# Patient Record
Sex: MALE | Race: WHITE | NOT HISPANIC OR LATINO | Employment: FULL TIME | URBAN - NONMETROPOLITAN AREA
[De-identification: names, ages, dates, MRNs, and addresses within clinical notes are randomized per-mention and may not be internally consistent; named-entity substitution may affect disease eponyms.]

---

## 2017-03-01 ENCOUNTER — OFFICE VISIT (OUTPATIENT)
Dept: MEDICAL GROUP | Facility: PHYSICIAN GROUP | Age: 37
End: 2017-03-01
Payer: COMMERCIAL

## 2017-03-01 VITALS
OXYGEN SATURATION: 98 % | WEIGHT: 240 LBS | DIASTOLIC BLOOD PRESSURE: 72 MMHG | SYSTOLIC BLOOD PRESSURE: 128 MMHG | HEIGHT: 70 IN | RESPIRATION RATE: 16 BRPM | HEART RATE: 70 BPM | BODY MASS INDEX: 34.36 KG/M2 | TEMPERATURE: 98.6 F

## 2017-03-01 DIAGNOSIS — R53.83 FATIGUE, UNSPECIFIED TYPE: ICD-10-CM

## 2017-03-01 DIAGNOSIS — E78.1 HYPERTRIGLYCERIDEMIA: ICD-10-CM

## 2017-03-01 DIAGNOSIS — R79.89 LOW TESTOSTERONE: ICD-10-CM

## 2017-03-01 DIAGNOSIS — R79.89 LFTS ABNORMAL: ICD-10-CM

## 2017-03-01 DIAGNOSIS — Z72.820 POOR SLEEP: ICD-10-CM

## 2017-03-01 PROCEDURE — 99214 OFFICE O/P EST MOD 30 MIN: CPT | Performed by: NURSE PRACTITIONER

## 2017-03-01 NOTE — MR AVS SNAPSHOT
"        Braden Hill   3/1/2017 10:20 AM   Office Visit   MRN: [id number]    Department:  [facility]   Dept Phone:  [phone]    Description:  Male : 1980   Provider:  YVAN Sanchez           Reason for Visit     Fatigue           Allergies as of 3/1/2017     No Known Allergies      You were diagnosed with     Fatigue, unspecified type   [9260402]       Hypertriglyceridemia   [022246]       Poor sleep   [4352817]         Vital Signs     Blood Pressure Pulse Temperature Respirations Height Weight    128/72 mmHg 70 37 °C (98.6 °F) 16 1.778 m (5' 10\") 108.863 kg (240 lb)    Body Mass Index Oxygen Saturation Smoking Status             34.44 kg/m2 98% Former Smoker         Basic Information     Date Of Birth Sex Race Ethnicity Preferred Language    1980 Male White Non- English      Problem List              ICD-10-CM Priority Class Noted - Resolved    Hypertriglyceridemia E78.1   2013 - Present    Poor sleep Z72.820   2013 - Present    BMI 34.0-34.9,adult Z68.34   9/10/2015 - Present    Fatigue R53.83   9/10/2015 - Present    Palpitations R00.2   9/10/2015 - Present    Elevated triglycerides with high cholesterol E78.2   9/15/2015 - Present    Low testosterone E29.1   9/15/2015 - Present    LFTs abnormal R79.89   9/15/2015 - Present    Vitamin D deficiency E55.9   2015 - Present      Health Maintenance        Date Due Completion Dates    IMM DTaP/Tdap/Td Vaccine (1 - Tdap) 1998 ---    IMM INFLUENZA (1) 2016 ---            Current Immunizations     Hepatitis B Vaccine Non-Recombivax (Ped/Adol) 2012    Hepatitis B Vaccine Recombivax (Adol/Adult) 2013, 2013      Below and/or attached are the medications your provider expects you to take. Review all of your home medications and newly ordered medications with your provider and/or pharmacist. Follow medication instructions as directed by your provider and/or pharmacist. Please keep your medication " list with you and share with your provider. Update the information when medications are discontinued, doses are changed, or new medications (including over-the-counter products) are added; and carry medication information at all times in the event of emergency situations     Allergies:  No Known Allergies          Medications  Valid as of: March 01, 2017 - 10:48 AM    Generic Name Brand Name Tablet Size Instructions for use    .                 Medicines prescribed today were sent to:     [id number] - [place], [place] - [address] [place] [address]    Phone: [phone] Fax: [phone]    Open 24 Hours?: No      Medication refill instructions:       If your prescription bottle indicates you have medication refills left, it is not necessary to call your provider’s office. Please contact your pharmacy and they will refill your medication.    If your prescription bottle indicates you do not have any refills left, you may request refills at any time through one of the following ways: The online [employer] system (except Urgent Care), by calling your provider’s office, or by asking your pharmacy to contact your provider’s office with a refill request. Medication refills are processed only during regular business hours and may not be available until the next business day. Your provider may request additional information or to have a follow-up visit with you prior to refilling your medication.   *Please Note: Medication refills are assigned a new Rx number when refilled electronically. Your pharmacy may indicate that no refills were authorized even though a new prescription for the same medication is available at the pharmacy. Please request the medicine by name with the pharmacy before contacting your provider for a refill.        Your To Do List     Future Labs/Procedures Complete By Expires    COMP METABOLIC PANEL  As directed 3/1/2018    VITAMIN D,25 HYDROXY  As directed  3/1/2018      Referral     A referral request has been sent to our patient care coordination department. Please allow 3-5 business days for us to process this request and contact you either by phone or mail. If you do not hear from us by the 5th business day, please call us at [phone].           [employer] Access Code: Activation code not generated  Current [employer] Status: Active

## 2017-03-01 NOTE — ASSESSMENT & PLAN NOTE
Patient was drinking alcohol when he had his last CMP done recheck with new labs. He is no longer drinking alcohol

## 2017-03-01 NOTE — ASSESSMENT & PLAN NOTE
Patient had a history of low testosterone and was referred to endocrinology. He did not keep the appointment

## 2017-03-01 NOTE — ASSESSMENT & PLAN NOTE
A 36-year-old male who's had a history of poor sleep at least 2-3 years. He had 2 overnight oximetry that showed no need for supplemental oxygen. The patient continued to be extremely fatigued. His mother-in-law wanted to help him out and gave him her sleep apnea machine. He is using this without any medical direction. I asked him to be referred to sleep studies as she may be doing more harm than good. He is agreeable

## 2017-03-01 NOTE — ASSESSMENT & PLAN NOTE
Patient continues to have fatigue. Wakes up tired. Works fulltime. Feels heavy. Minds feels foggy. This is progressing.

## 2017-03-01 NOTE — PROGRESS NOTES
Chief Complaint   Patient presents with   • Fatigue       HISTORY OF PRESENT ILLNESS: Patient is a @ age 36 here  today to discuss:    Interval history:     Hospitalizations none    Injuries none    Illness none        Review of Systems   Constitutional: Negative for fever, chills, weight loss patient had major fatigue  HENT: Negative for ear pain, nosebleeds, congestion, sore throat and neck pain.    Eyes: Negative for blurred vision.   Respiratory: Negative for cough, sputum production, shortness of breath and wheezing.    Cardiovascular: Negative for chest pain, positive for palpitations, orthopnea and leg swelling.   Gastrointestinal: Negative for heartburn, nausea, vomiting and abdominal pain.   Genitourinary: Negative for dysuria, urgency and frequency.   Musculoskeletal: Negative for myalgias, back pain and joint pain.   Skin: Negative for rash and itching.   Neurological: Negative for dizziness, tingling, tremors, sensory change, focal weakness and headaches.   Endo/Heme/Allergies: Does not bruise/bleed easily.   Psychiatric/Behavioral: Positive for depression, anxiety, or memory loss.    Patient states that he just is depressed because he feels tired all the time.   Fatigue  Patient continues to have fatigue. Wakes up tired. Works fulltime. Feels heavy. Minds feels foggy. This is progressing.     Hypertriglyceridemia  Patient was diagnosed with elevated triglycerides. Patient was given RX for this, did not take.     Poor sleep  A 36-year-old male who's had a history of poor sleep at least 2-3 years. He had 2 overnight oximetry that showed no need for supplemental oxygen. The patient continued to be extremely fatigued. His mother-in-law wanted to help him out and gave him her sleep apnea machine. He is using this without any medical direction. I asked him to be referred to sleep studies as she may be doing more harm than good. He is agreeable    LFTs abnormal  Patient was drinking alcohol when he had his  "last CMP done recheck with new labs. He is no longer drinking alcohol    Low testosterone  Patient had a history of low testosterone and was referred to endocrinology. He did not keep the appointment        Allergies:Review of patient's allergies indicates no known allergies.    No current [facility]-ordered outpatient prescriptions on file.     No current [facility]-ordered facility-administered medications on file.       Past Medical History   Diagnosis Date   • Hypertriglyceridemia 2013   • Has stopped breathing 2013     At night, fatigued all the time       Social History   Substance Use Topics   • Smoking status: Former Smoker -- 0.25 packs/day for 20 years     Types: Cigars     Quit date: 2011   • Smokeless tobacco: Current User      Comment: 1 can qod   • Alcohol Use: Yes      Comment: Social       Family Status   Relation Status Death Age   • Mother Alive    • Father Alive    • Maternal Grandmother     • Maternal Grandfather     • Paternal Grandmother     • Paternal Grandfather       Family History   Problem Relation Age of Onset   • Hypertension Father    • Cancer Paternal Aunt      lung   • Heart Disease Neg Hx    • Diabetes Neg Hx    • Stroke Neg Hx    • Hyperlipidemia Neg Hx        ROS: As documented in my HPI      Exam:  Blood pressure 128/72, pulse 70, temperature 37 °C (98.6 °F), resp. rate 16, height 1.778 m (5' 10\"), weight 108.863 kg (240 lb), SpO2 98 %.  General:  Well nourished, well developed male in NAD  Head: Nontender scalp. No lesions  Neck: Supple. Symmetric Thyroid palpated. No bruits  Pulmonary:  Normal effort. No rales, ronchi, or wheezing.  Cardiovascular: Regular rate and rhythm without murmur.   Abdomen: Soft nontender, normal bowel sounds  Extremities: no clubbing, cyanosis, or edema.  Psych: Alert and oriented x3.  Emotional: Mood stable pleasant Affect normal  Neurological: No focal deficits    Please note that this dictation was created using " voice recognition software. I have made every reasonable attempt to correct obvious errors, but I expect that there are errors of grammar and possibly content that I did not discover before finalizing the note.    Assessment/Plan:  1. Fatigue, unspecified type  REFERRAL TO SLEEP STUDIES    COMP METABOLIC PANEL    TSH+FREE T4    TESTOSTERONE, FREE AND TOTAL    VITAMIN D,25 HYDROXY   2. Hypertriglyceridemia  LIPID PANEL   3. Poor sleep   sleep study    4. LFTs abnormal   CMP     5. Low testosterone   labs      Patient will return to clinic in one month after getting his labs drawn and revisit.

## 2017-03-29 ENCOUNTER — HOSPITAL ENCOUNTER (OUTPATIENT)
Dept: LAB | Facility: MEDICAL CENTER | Age: 37
End: 2017-03-29
Attending: NURSE PRACTITIONER
Payer: COMMERCIAL

## 2017-03-29 DIAGNOSIS — R53.83 FATIGUE, UNSPECIFIED TYPE: ICD-10-CM

## 2017-03-29 PROCEDURE — 36415 COLL VENOUS BLD VENIPUNCTURE: CPT

## 2017-03-29 PROCEDURE — 82306 VITAMIN D 25 HYDROXY: CPT

## 2017-03-29 PROCEDURE — 80053 COMPREHEN METABOLIC PANEL: CPT

## 2017-03-29 PROCEDURE — 84270 ASSAY OF SEX HORMONE GLOBUL: CPT

## 2017-03-29 PROCEDURE — 84403 ASSAY OF TOTAL TESTOSTERONE: CPT

## 2017-03-29 PROCEDURE — 84402 ASSAY OF FREE TESTOSTERONE: CPT

## 2017-03-29 PROCEDURE — 84443 ASSAY THYROID STIM HORMONE: CPT

## 2017-03-29 PROCEDURE — 84439 ASSAY OF FREE THYROXINE: CPT

## 2017-03-29 PROCEDURE — 80061 LIPID PANEL: CPT

## 2017-03-30 LAB
25(OH)D3 SERPL-MCNC: 18 NG/ML (ref 30–100)
ALBUMIN SERPL BCP-MCNC: 4.3 G/DL (ref 3.2–4.9)
ALBUMIN/GLOB SERPL: 1.7 G/DL
ALP SERPL-CCNC: 107 U/L (ref 30–99)
ALT SERPL-CCNC: 25 U/L (ref 2–50)
ANION GAP SERPL CALC-SCNC: 9 MMOL/L (ref 0–11.9)
AST SERPL-CCNC: 21 U/L (ref 12–45)
BILIRUB SERPL-MCNC: 0.5 MG/DL (ref 0.1–1.5)
BUN SERPL-MCNC: 12 MG/DL (ref 8–22)
CALCIUM SERPL-MCNC: 9.3 MG/DL (ref 8.5–10.5)
CHLORIDE SERPL-SCNC: 106 MMOL/L (ref 96–112)
CHOLEST SERPL-MCNC: 266 MG/DL (ref 100–199)
CO2 SERPL-SCNC: 24 MMOL/L (ref 20–33)
CREAT SERPL-MCNC: 0.9 MG/DL (ref 0.5–1.4)
GLOBULIN SER CALC-MCNC: 2.5 G/DL (ref 1.9–3.5)
GLUCOSE SERPL-MCNC: 99 MG/DL (ref 65–99)
HDLC SERPL-MCNC: ABNORMAL MG/DL
LDLC SERPL CALC-MCNC: ABNORMAL MG/DL
POTASSIUM SERPL-SCNC: 3.9 MMOL/L (ref 3.6–5.5)
PROT SERPL-MCNC: 6.8 G/DL (ref 6–8.2)
SODIUM SERPL-SCNC: 139 MMOL/L (ref 135–145)
T4 FREE SERPL-MCNC: 0.93 NG/DL (ref 0.53–1.43)
TRIGL SERPL-MCNC: 1026 MG/DL (ref 0–149)
TSH SERPL DL<=0.005 MIU/L-ACNC: 0.94 UIU/ML (ref 0.3–3.7)

## 2017-03-31 ENCOUNTER — OFFICE VISIT (OUTPATIENT)
Dept: MEDICAL GROUP | Facility: PHYSICIAN GROUP | Age: 37
End: 2017-03-31
Payer: COMMERCIAL

## 2017-03-31 VITALS
TEMPERATURE: 98.4 F | BODY MASS INDEX: 34.22 KG/M2 | DIASTOLIC BLOOD PRESSURE: 86 MMHG | RESPIRATION RATE: 14 BRPM | OXYGEN SATURATION: 95 % | HEIGHT: 70 IN | WEIGHT: 239 LBS | SYSTOLIC BLOOD PRESSURE: 120 MMHG | HEART RATE: 88 BPM

## 2017-03-31 DIAGNOSIS — R73.9 ELEVATED BLOOD SUGAR: ICD-10-CM

## 2017-03-31 DIAGNOSIS — R79.89 LOW TESTOSTERONE: ICD-10-CM

## 2017-03-31 DIAGNOSIS — R79.89 LFTS ABNORMAL: ICD-10-CM

## 2017-03-31 DIAGNOSIS — E55.9 VITAMIN D DEFICIENCY: ICD-10-CM

## 2017-03-31 DIAGNOSIS — E78.1 HYPERTRIGLYCERIDEMIA: ICD-10-CM

## 2017-03-31 LAB
SHBG SERPL-SCNC: 19 NMOL/L (ref 11–80)
TESTOST FREE MFR SERPL: 2.3 % (ref 1.6–2.9)
TESTOST FREE SERPL-MCNC: 65 PG/ML (ref 47–244)
TESTOST SERPL-MCNC: 284 NG/DL (ref 300–1080)

## 2017-03-31 PROCEDURE — 99214 OFFICE O/P EST MOD 30 MIN: CPT | Performed by: NURSE PRACTITIONER

## 2017-03-31 RX ORDER — FENOFIBRATE 145 MG/1
145 TABLET, COATED ORAL DAILY
Qty: 30 TAB | Refills: 3 | Status: SHIPPED | OUTPATIENT
Start: 2017-03-31 | End: 2018-08-08 | Stop reason: SDUPTHER

## 2017-03-31 ASSESSMENT — PATIENT HEALTH QUESTIONNAIRE - PHQ9: CLINICAL INTERPRETATION OF PHQ2 SCORE: 0

## 2017-03-31 NOTE — MR AVS SNAPSHOT
"        Braden Hill   3/31/2017 11:00 AM   Office Visit   MRN: [id number]    Department:  [facility]   Dept Phone:  [phone]    Description:  Male : 1980   Provider:  YVAN Sanchez           Reason for Visit     Results labs      Allergies as of 3/31/2017     No Known Allergies      You were diagnosed with     Elevated blood sugar   [127936]       Vitamin D deficiency   [1983307]       LFTs abnormal   [480964]       Hypertriglyceridemia   [767630]       Low testosterone   [728260]         Vital Signs     Blood Pressure Pulse Temperature Respirations Height Weight    120/86 mmHg 88 36.9 °C (98.4 °F) 14 1.778 m (5' 10\") 108.41 kg (239 lb)    Body Mass Index Oxygen Saturation Smoking Status             34.29 kg/m2 95% Former Smoker         Basic Information     Date Of Birth Sex Race Ethnicity Preferred Language    1980 Male White Non- English      Problem List              ICD-10-CM Priority Class Noted - Resolved    Hypertriglyceridemia E78.1   2013 - Present    Poor sleep Z72.820   2013 - Present    BMI 34.0-34.9,adult Z68.34   9/10/2015 - Present    Fatigue R53.83   9/10/2015 - Present    Palpitations R00.2   9/10/2015 - Present    Low testosterone E29.1   9/15/2015 - Present    LFTs abnormal R79.89   9/15/2015 - Present    Vitamin D deficiency E55.9   2015 - Present    Elevated blood sugar R73.9 High  3/31/2017 - Present      Health Maintenance        Date Due Completion Dates    IMM DTaP/Tdap/Td Vaccine (1 - Tdap) 1998 ---    IMM INFLUENZA (1) 2016 ---            Current Immunizations     Hepatitis B Vaccine Non-Recombivax (Ped/Adol) 2012    Hepatitis B Vaccine Recombivax (Adol/Adult) 2013, 2013      Below and/or attached are the medications your provider expects you to take. Review all of your home medications and newly ordered medications with your provider and/or pharmacist. Follow medication instructions as directed by your " provider and/or pharmacist. Please keep your medication list with you and share with your provider. Update the information when medications are discontinued, doses are changed, or new medications (including over-the-counter products) are added; and carry medication information at all times in the event of emergency situations     Allergies:  No Known Allergies          Medications  Valid as of: March 31, 2017 -  2:04 PM    Generic Name Brand Name Tablet Size Instructions for use    Fenofibrate (Tab) TRICOR 145 MG Take 1 Tab by mouth every day.        .                 Medicines prescribed today were sent to:     [id number] [place] [facility] [address]    [address] [place] [address]    Phone: [phone] Fax: [phone]    Open 24 Hours?: No      Medication refill instructions:       If your prescription bottle indicates you have medication refills left, it is not necessary to call your provider’s office. Please contact your pharmacy and they will refill your medication.    If your prescription bottle indicates you do not have any refills left, you may request refills at any time through one of the following ways: The online [email or web address] system (except Urgent Care), by calling your provider’s office, or by asking your pharmacy to contact your provider’s office with a refill request. Medication refills are processed only during regular business hours and may not be available until the next business day. Your provider may request additional information or to have a follow-up visit with you prior to refilling your medication.   *Please Note: Medication refills are assigned a new Rx number when refilled electronically. Your pharmacy may indicate that no refills were authorized even though a new prescription for the same medication is available at the pharmacy. Please request the medicine by name with the pharmacy before contacting your provider for a refill.        Your To Do List     Future  Labs/Procedures Complete By Expires    VITAMIN D,25 HYDROXY  As directed 3/31/2018      Instructions    1. New medication - TRICHOR once a day    2. Obtain Vitamin D 2, 3 or 5 thousand units daily.    3. Avoiding the simple sugars.    4. Recheck in 3 months.           [employer] Access Code: Activation code not generated  Current [employer] Status: Active

## 2017-03-31 NOTE — PROGRESS NOTES
Chief Complaint   Patient presents with   • Results     labs       HISTORY OF PRESENT ILLNESS: Patient is a @ age 36 here  today to discuss:    Interval history:     Hospitalizations No     Injuries NO     Illness NO         Elevated blood sugar  Counseled patient on diet and exercise. Patient will continue to change diet. Will recheck in 3 months.     Vitamin D deficiency  Patient has vitamin d level at 18, start taking 3-5, 000 units daily. Will recheck in 3 months.     LFTs abnormal  Liver enzymes are normal [place]  Is 107. Drinking less alcohol. Working out at [employer]    Hypertriglyceridemia  Results for JAYDA LANE (MRN [id number]) as of 3/31/2017 11:16   Ref. Range 9/10/2015 11:12 9/22/2015 13:44 3/29/2017 15:53   Cholesterol,Tot Latest Ref Range: 100-199 mg/dL 387 (H)  266 (H)   Triglycerides Latest Ref Range: 0-149 mg/dL 3036 (H)  1026 (H)   HDL Latest Ref Range: >=40 mg/dL see below  see below   LDL Latest Ref Range: <100 mg/dL see below  see below       Reviewed labs with patient. Improve with no medication. But still needs treatment. Patient is willing to start. Counseled on medication. Side effects and follow up.     Low testosterone  Lab is pending.         Allergies:Review of patient's allergies indicates no known allergies.    Current Outpatient Prescriptions Ordered in [facility]   Medication Sig Dispense Refill   • fenofibrate (TRICOR) 145 MG Tab Take 1 Tab by mouth every day. 30 Tab 3     No current Epic-ordered facility-administered medications on file.       Past Medical History   Diagnosis Date   • Hypertriglyceridemia 9/21/2013   • Has stopped breathing 9/21/2013     At night, fatigued all the time       Social History   Substance Use Topics   • Smoking status: Former Smoker -- 0.25 packs/day for 20 years     Types: Cigars     Quit date: 09/21/2011   • Smokeless tobacco: Former User     Quit date: 01/01/2017      Comment: 1 can qod   • Alcohol Use: Yes      Comment: Social       Family Status   Relation  "Status Death Age   • Mother Alive    • Father Alive    • Maternal Grandmother     • Maternal Grandfather     • Paternal Grandmother     • Paternal Grandfather       Family History   Problem Relation Age of Onset   • Hypertension Father    • Cancer Paternal Aunt      lung   • Heart Disease Neg Hx    • Diabetes Neg Hx    • Stroke Neg Hx    • Hyperlipidemia Neg Hx        ROS: As documented in my HPI      Exam:  Blood pressure 120/86, pulse 88, temperature 36.9 °C (98.4 °F), resp. rate 14, height 1.778 m (5' 10\"), weight 108.41 kg (239 lb), SpO2 95 %.  General:  Well nourished, well developed male in NAD  Head: Nontender scalp. No lesions  Neck: Supple. Symmetric Thyroid palpated. No bruits  Pulmonary:  Normal effort. No rales, ronchi, or wheezing.  Cardiovascular: Regular rate and rhythm without murmur.   Extremities: no clubbing, cyanosis, or edema.  Psych: Alert and oriented x3.  Neurological: No focal deficits    Please note that this dictation was created using voice recognition software. I have made every reasonable attempt to correct obvious errors, but I expect that there are errors of grammar and possibly content that I did not discover before finalizing the note.    Assessment/Plan:  1. Elevated blood sugar  Monitor   2. Vitamin D deficiency  VITAMIN D,25 HYDROXY- new problem and not controlled   3. LFTs abnormal  HEPATIC FUNCTION PANEL continue to monitor   4. Hypertriglyceridemia - NOT controlled LIPID PANEL - Tricor 145 mg one daily- DIET    5. Low testosterone          Repeat labs in 3 months and return for follow up.    Patient will be notified of testosterone level.       "

## 2017-03-31 NOTE — ASSESSMENT & PLAN NOTE
Results for JAYDA LANE (MRN [id number]) as of 3/31/2017 11:16   Ref. Range 9/10/2015 11:12 9/22/2015 13:44 3/29/2017 15:53   Cholesterol,Tot Latest Ref Range: 100-199 mg/dL 387 (H)  266 (H)   Triglycerides Latest Ref Range: 0-149 mg/dL 3036 (H)  1026 (H)   HDL Latest Ref Range: >=40 mg/dL see below  see below   LDL Latest Ref Range: <100 mg/dL see below  see below       Reviewed labs with patient. Improve with no medication. But still needs treatment.

## 2017-03-31 NOTE — PATIENT INSTRUCTIONS
1. New medication - TRICHOR once a day    2. Obtain Vitamin D 2, 3 or 5 thousand units daily.    3. Avoiding the simple sugars.    4. Recheck in 3 months.

## 2018-03-31 ENCOUNTER — OFFICE VISIT (OUTPATIENT)
Dept: URGENT CARE | Facility: PHYSICIAN GROUP | Age: 38
End: 2018-03-31
Payer: COMMERCIAL

## 2018-03-31 VITALS
WEIGHT: 250 LBS | OXYGEN SATURATION: 97 % | TEMPERATURE: 98.3 F | DIASTOLIC BLOOD PRESSURE: 70 MMHG | HEIGHT: 70 IN | BODY MASS INDEX: 35.79 KG/M2 | HEART RATE: 90 BPM | RESPIRATION RATE: 16 BRPM | SYSTOLIC BLOOD PRESSURE: 120 MMHG

## 2018-03-31 DIAGNOSIS — J06.9 ACUTE URI: Primary | ICD-10-CM

## 2018-03-31 PROCEDURE — 99214 OFFICE O/P EST MOD 30 MIN: CPT | Performed by: NURSE PRACTITIONER

## 2018-03-31 RX ORDER — AMOXICILLIN AND CLAVULANATE POTASSIUM 875; 125 MG/1; MG/1
1 TABLET, FILM COATED ORAL 2 TIMES DAILY
Qty: 20 TAB | Refills: 0 | Status: SHIPPED | OUTPATIENT
Start: 2018-03-31 | End: 2018-08-08

## 2018-03-31 RX ORDER — AMOXICILLIN AND CLAVULANATE POTASSIUM 875; 125 MG/1; MG/1
1 TABLET, FILM COATED ORAL 2 TIMES DAILY
Qty: 20 TAB | Refills: 0 | Status: SHIPPED | OUTPATIENT
Start: 2018-03-31 | End: 2018-03-31 | Stop reason: SDUPTHER

## 2018-03-31 RX ORDER — CODEINE PHOSPHATE AND GUAIFENESIN 10; 100 MG/5ML; MG/5ML
5-10 SOLUTION ORAL EVERY 6 HOURS PRN
Qty: 120 ML | Refills: 0 | Status: SHIPPED | OUTPATIENT
Start: 2018-03-31 | End: 2018-04-10

## 2018-03-31 ASSESSMENT — ENCOUNTER SYMPTOMS
VOMITING: 0
RHINORRHEA: 1
SHORTNESS OF BREATH: 0
FEVER: 0
SPUTUM PRODUCTION: 1
SORE THROAT: 0
MYALGIAS: 0
DIARRHEA: 0
CHILLS: 0
WEAKNESS: 1
NAUSEA: 0
COUGH: 1

## 2018-03-31 ASSESSMENT — COPD QUESTIONNAIRES: COPD: 0

## 2018-03-31 NOTE — PROGRESS NOTES
"Subjective:      Braden Hill is a 37 y.o. male who presents with Sinusitis (URI, possible sinus infection)            Medications, Allergies and Prior Medical Hx reviewed and updated in [facility].with patient/family today         Cough   This is a new problem. The current episode started yesterday. The problem has been unchanged. The cough is productive of sputum. Associated symptoms include ear congestion, nasal congestion and rhinorrhea. Pertinent negatives include no chills, ear pain, fever, myalgias, sore throat or shortness of breath. He has tried rest for the symptoms. The treatment provided mild relief. There is no history of asthma or COPD.       Review of Systems   Constitutional: Positive for malaise/fatigue. Negative for chills and fever.   HENT: Positive for congestion and rhinorrhea. Negative for ear discharge, ear pain and sore throat.    Respiratory: Positive for cough and sputum production. Negative for shortness of breath.    Gastrointestinal: Negative for diarrhea, nausea and vomiting.   Musculoskeletal: Negative for myalgias.   Neurological: Positive for weakness.          Objective:     /70   Pulse 90   Temp 36.8 °C (98.3 °F)   Resp 16   Ht 1.778 m (5' 10\")   Wt 113.4 kg (250 lb)   SpO2 97%   BMI 35.87 kg/m²      Physical Exam   Constitutional: He appears well-developed and well-nourished. No distress.   HENT:   Head: Normocephalic and atraumatic.   Right Ear: Tympanic membrane and ear canal normal.   Left Ear: Tympanic membrane and ear canal normal.   Nose: Rhinorrhea present.   Mouth/Throat: Uvula is midline and mucous membranes are normal. No trismus in the jaw. No uvula swelling. Posterior oropharyngeal edema and posterior oropharyngeal erythema present. No oropharyngeal exudate.   Eyes: Conjunctivae are normal. Pupils are equal, round, and reactive to light.   Neck: Neck supple.   Cardiovascular: Normal rate, regular rhythm and normal heart sounds.    Pulmonary/Chest: Effort normal " and breath sounds normal. No respiratory distress. He has no wheezes.   Lymphadenopathy:     He has cervical adenopathy.   Neurological: He is alert.   Skin: Skin is warm and dry. Capillary refill takes less than 2 seconds.   Psychiatric: He has a normal mood and affect. His behavior is normal.   Vitals reviewed.              Assessment/Plan:     1. Acute URI  guaifenesin-codeine (CHERATUSSIN AC) Solution oral solution    amoxicillin-clavulanate (AUGMENTIN) 875-125 MG Tab    DISCONTINUED: amoxicillin-clavulanate (AUGMENTIN) 875-125 MG Tab       Pt is going on vacation tomorrow. His wife has the same sx and was treated with Augmentin.     Do not drink alcohol, take sedating medications,  or operate machinery with this medication  Pt reviewed on [place]  Aware,  no remarkable controlled substance prescription documentation noted.   Pt states otc meds are insufficient to cover his cough    Modified Epic generated written imformation provided along with verbal instructions    Rest, Fluids, tylenol, ibuprofen,  humidified air, and otc decongestants  Pt will go to the ER for worsening or changing symptoms as discussed,   Follow-up with your primary care provider or return here if not improving in 5 - 7 days   Discharge instructions discussed with pt/family who verbalize understanding and agreement with poc

## 2018-07-30 ENCOUNTER — TELEPHONE (OUTPATIENT)
Dept: MEDICAL GROUP | Facility: PHYSICIAN GROUP | Age: 38
End: 2018-07-30

## 2018-07-30 DIAGNOSIS — R73.9 ELEVATED BLOOD SUGAR: ICD-10-CM

## 2018-07-30 DIAGNOSIS — E78.1 HYPERTRIGLYCERIDEMIA: ICD-10-CM

## 2018-07-30 DIAGNOSIS — R53.83 FATIGUE, UNSPECIFIED TYPE: ICD-10-CM

## 2018-07-30 NOTE — TELEPHONE ENCOUNTER
Called [facility] advising PCP ordered labs and if he has any further questions please give us a call at [phone].

## 2018-07-30 NOTE — TELEPHONE ENCOUNTER
Patient's wife called and stated that he had an episode of passing out at work. He has an appt to establish care on 8/8/18 and she would like to know if you would like to order lab work prior to this.

## 2018-08-08 ENCOUNTER — OFFICE VISIT (OUTPATIENT)
Dept: MEDICAL GROUP | Facility: PHYSICIAN GROUP | Age: 38
End: 2018-08-08
Payer: COMMERCIAL

## 2018-08-08 VITALS
HEART RATE: 96 BPM | SYSTOLIC BLOOD PRESSURE: 110 MMHG | WEIGHT: 223 LBS | TEMPERATURE: 98.3 F | RESPIRATION RATE: 16 BRPM | HEIGHT: 71 IN | BODY MASS INDEX: 31.22 KG/M2 | DIASTOLIC BLOOD PRESSURE: 80 MMHG | OXYGEN SATURATION: 94 %

## 2018-08-08 DIAGNOSIS — E78.1 HYPERTRIGLYCERIDEMIA: ICD-10-CM

## 2018-08-08 DIAGNOSIS — G47.9 DISORDERED SLEEP: ICD-10-CM

## 2018-08-08 DIAGNOSIS — G47.33 OSA (OBSTRUCTIVE SLEEP APNEA): ICD-10-CM

## 2018-08-08 DIAGNOSIS — R73.9 ELEVATED BLOOD SUGAR: ICD-10-CM

## 2018-08-08 DIAGNOSIS — R03.0 ELEVATED BLOOD PRESSURE READING: ICD-10-CM

## 2018-08-08 DIAGNOSIS — Z23 NEED FOR TDAP VACCINATION: ICD-10-CM

## 2018-08-08 DIAGNOSIS — K21.9 GASTROESOPHAGEAL REFLUX DISEASE, ESOPHAGITIS PRESENCE NOT SPECIFIED: ICD-10-CM

## 2018-08-08 DIAGNOSIS — E66.9 OBESITY (BMI 30-39.9): ICD-10-CM

## 2018-08-08 PROCEDURE — 90471 IMMUNIZATION ADMIN: CPT | Performed by: NURSE PRACTITIONER

## 2018-08-08 PROCEDURE — 90715 TDAP VACCINE 7 YRS/> IM: CPT | Performed by: NURSE PRACTITIONER

## 2018-08-08 PROCEDURE — 99214 OFFICE O/P EST MOD 30 MIN: CPT | Mod: 25 | Performed by: NURSE PRACTITIONER

## 2018-08-08 RX ORDER — ZALEPLON 5 MG/1
5 CAPSULE ORAL NIGHTLY PRN
Qty: 30 CAP | Refills: 2 | Status: SHIPPED | OUTPATIENT
Start: 2018-08-08 | End: 2019-12-12 | Stop reason: SDUPTHER

## 2018-08-08 RX ORDER — FENOFIBRATE 145 MG/1
145 TABLET, COATED ORAL DAILY
Qty: 30 TAB | Refills: 3 | Status: SHIPPED | OUTPATIENT
Start: 2018-08-08 | End: 2019-12-12

## 2018-08-08 RX ORDER — OMEPRAZOLE 20 MG/1
20 CAPSULE, DELAYED RELEASE ORAL DAILY
Qty: 90 CAP | Refills: 3 | Status: SHIPPED | OUTPATIENT
Start: 2018-08-08 | End: 2019-12-12

## 2018-08-08 ASSESSMENT — PATIENT HEALTH QUESTIONNAIRE - PHQ9
CLINICAL INTERPRETATION OF PHQ2 SCORE: 3
5. POOR APPETITE OR OVEREATING: 1 - SEVERAL DAYS
SUM OF ALL RESPONSES TO PHQ QUESTIONS 1-9: 11

## 2018-08-08 NOTE — ASSESSMENT & PLAN NOTE
Patient has past history of elevated fasting blood glucose, he is past due for labs, these were ordered.  He does deny symptoms of type 2 diabetes, he is trying to work on his diet and trying to lose weight.

## 2018-08-08 NOTE — ASSESSMENT & PLAN NOTE
Patient reports to be struggling with frequent heartburn especially with certain foods.  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  He was given printed educational material on GERD and what foods to avoid.  He will be started on omeprazole 20 mg daily on an empty stomach.

## 2018-08-08 NOTE — PATIENT INSTRUCTIONS
Have your labs done before next appt with me in 2-3 months    Will have you start Fenofibrate 145 mg daily    Sleeping pill and omeprazole for reflux    Will have you restart CPAP, but will refer to sleep medicine      Preventing High Cholesterol  Cholesterol is a waxy, fat-like substance that your body needs in small amounts. Your liver makes all the cholesterol that your body needs. Having high cholesterol (hypercholesterolemia) increases your risk for heart disease and stroke. Extra (excess) cholesterol comes from the food you eat, such as animal-based fat (saturated fat) from meat and some dairy products.  High cholesterol can often be prevented with diet and lifestyle changes. If you already have high cholesterol, you can control it with diet and lifestyle changes, as well as medicine.  What nutrition changes can be made?  · Eat less saturated fat. Foods that contain saturated fat include red meat and some dairy products.  · Avoid processed meats, like griffiths and lunch meats.  · Avoid trans fats, which are found in margarine and some baked goods.  · Avoid foods and beverages that have added sugars.  · Eat more fruits, vegetables, and whole grains.  · Choose healthy sources of protein, such as fish, poultry, and nuts.  · Choose healthy sources of fat, such as:  ¨ Nuts.  ¨ Vegetable oils, especially olive oil.  ¨ Fish that have healthy fats (omega-3 fatty acids), such as mackerel or salmon.  What lifestyle changes can be made?  · Lose weight if you are overweight. Losing 5-10 lb (2.3-4.5 kg) can help prevent or control high cholesterol and reduce your risk for diabetes and high blood pressure. Ask your health care provider to help you with a diet and exercise plan to safely lose weight.  · Get enough exercise. Do at least 150 minutes of moderate-intensity exercise each week.  ¨ You could do this in short exercise sessions several times a day, or you could do longer exercise sessions a few times a week. For  example, you could take a brisk 10-minute walk or bike ride, 3 times a day, for 5 days a week.  · Do not smoke. If you need help quitting, ask your health care provider.  · Limit your alcohol intake. If you drink alcohol, limit alcohol intake to no more than 1 drink a day for nonpregnant women and 2 drinks a day for men. One drink equals 12 oz of beer, 5 oz of wine, or 1½ oz of hard liquor.  Why are these changes important?  If you have high cholesterol, deposits (plaques) may build up on the walls of your blood vessels. Plaques make the arteries narrower and stiffer, which can restrict or block blood flow and cause blood clots to form. This greatly increases your risk for heart attack and stroke. Making diet and lifestyle changes can reduce your risk for these life-threatening conditions.  What can I do to lower my risk?  · Manage your risk factors for high cholesterol. Talk with your health care provider about all of your risk factors and how to lower your risk.  · Manage other conditions that you have, such as diabetes or high blood pressure (hypertension).  · Have your cholesterol checked at regular intervals.  · Keep all follow-up visits as told by your health care provider. This is important.  How is this treated?  In addition to diet and lifestyle changes, your health care provider may recommend medicines to help lower cholesterol, such as a medicine to reduce the amount of cholesterol made in your liver. You may need medicine if:  · Diet and lifestyle changes do not lower your cholesterol enough.  · You have high cholesterol and other risk factors for heart disease or stroke.  Take over-the-counter and prescription medicines only as told by your health care provider.  Where to find more information:  · American Heart Association: www.heart.org/HEARTORG/Conditions/Cholesterol/Cholesterol_UC_001089_SubHomePage.jsp  · National Heart, Lung, and Blood [place]:  www.nhlbi.nih.gov/health/resources/heart/heart-cholesterol-hbc-what-html  Summary  · High cholesterol increases your risk for heart disease and stroke. By keeping your cholesterol level low, you can reduce your risk for these conditions.  · Diet and lifestyle changes are the most important steps in preventing high cholesterol.  · Work with your health care provider to manage your risk factors, and have your blood tested regularly.  This information is not intended to replace advice given to you by your health care provider. Make sure you discuss any questions you have with your health care provider.  Document Released: 01/01/2017 Document Revised: 08/26/2017 Document Reviewed: 08/26/2017  [employer] Interactive Patient Education © 2017 Elsevier Inc.      Gastroesophageal Reflux Scan  A gastroesophageal reflux scan is a procedure that is used to check for gastroesophageal reflux, which is the backward flow of stomach contents into the tube that carries food from the mouth to the stomach (esophagus). The scan can also show if any stomach contents are inhaled (aspirated) into your lungs. You may need this scan if you have symptoms such as heartburn, vomiting, swallowing problems, or regurgitation. Regurgitation means that swallowed food is returning from the stomach to the esophagus.  For this scan, you will drink a liquid that contains a small amount of a radioactive substance (tracer). A scanner with a camera that detects the radioactive tracer is used to see if any of the material backs up into your esophagus.  Tell a health care provider about:  · Any allergies you have.  · All medicines you are taking, including vitamins, herbs, eye drops, creams, and over-the-counter medicines.  · Any blood disorders you have.  · Any surgeries you have had.  · Any medical conditions you have.  · If you are pregnant or you think that you may be pregnant.  · If you are breastfeeding.  What are the risks?  Generally, this is a safe  procedure. However, problems may occur, including:  · Exposure to radiation (a small amount).  · Allergic reaction to the radioactive substance. This is rare.  What happens before the procedure?  · Ask your health care provider about changing or stopping your regular medicines. This is especially important if you are taking diabetes medicines or blood thinners.  · Follow your health care provider’s instructions about eating or drinking restrictions.  What happens during the procedure?  · You will be asked to drink a liquid that contains a small amount of a radioactive tracer. This liquid will probably be similar to orange juice.  · You will assume a position lying on your back.  · A series of images will be taken of your esophagus and upper stomach.  · You may be asked to move into different positions to help determine if reflux occurs more often when you are in specific positions.  · For adults, an abdominal binder with an inflatable cuff may be placed on the belly (abdomen). This may be used to increase abdominal pressure. More images will be taken to see if the increased pressure causes reflux to occur.  The procedure may vary among health care providers and hospitals.  What happens after the procedure?  · Return to your normal activities and your normal diet as directed by your health care provider.  · The radioactive tracer will leave your body over the next few days. Drink enough fluid to keep your urine clear or pale yellow. This will help to flush the tracer out of your body.  · It is your responsibility to obtain your test results. Ask your health care provider or the department performing the test when and how you will get your results.  This information is not intended to replace advice given to you by your health care provider. Make sure you discuss any questions you have with your health care provider.  Document Released: 02/08/2007 Document Revised: 09/11/2017 Document Reviewed: 09/29/2015  Eric  Interactive Patient Education © 2017 Elsevier Inc.

## 2018-08-08 NOTE — PROGRESS NOTES
Chief Complaint   Patient presents with   • Hyperlipidemia     est care, labs from biometrics screening         This is a 37 y.o.male patient that presents today with the following: Follow-up, review labs    Elevated blood sugar  Patient has past history of elevated fasting blood glucose, he is past due for labs, these were ordered.  He does deny symptoms of type 2 diabetes, he is trying to work on his diet and trying to lose weight.    Hypertriglyceridemia  Patient has brought in outside labs that were done through his employer, triglycerides was noted to be 650 which was the cut off for their lab, the HDL and LDL are unable to be calculated.  He does have a past history of triglycerides being as high as 3036.  He has been on fenofibrate in the past, we will have him restart this and recheck labs again in 3 months.  He was also given printed education material on prevention of high cholesterol.  He was advised to work on dietary changes, increasing physical activity and continue efforts towards weight loss.    Elevated blood pressure reading  Patient had elevated blood pressure reading during biometrics done through his employer, blood pressure was noted to be 155/97 on 1 arm and 136/102 in the other.  At the time he states he just got off work, he was very tired and anxious to get out of there.  Today's blood pressure today is 110/80 and he denies symptoms of hypertension.  He has not been treated for hypertension in the past.  We will continue to monitor this.    Disordered sleep  Patient does admit to disordered sleep, he states he has been diagnosed with obstructive sleep apnea in the past but was never given a formal prescription for equipment and has been using his mother's used equipment.  He has been doing this for a few years, however he lost weight and states he was sleeping much better so he stopped using the CPAP.  Unfortunately he has gained weight back and his wife reports that he is snoring again.   He has trouble falling asleep as well as staying asleep.  He is agreeable to be referred back to sleep medicine for a repeat sleep study.  In the interim we discussed the importance of practicing good sleep hygiene and will have him try a short course of Sonata 5 mg at bedtime.    TOMER (obstructive sleep apnea)  See additional notes    Gastroesophageal reflux disease  Patient reports to be struggling with frequent heartburn especially with certain foods.  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  He was given printed educational material on GERD and what foods to avoid.  He will be started on omeprazole 20 mg daily on an empty stomach.    Obesity (BMI 30-39.9)  See additional notes      No visits with results within 1 Month(s) from this visit.   Latest known visit with results is:   Hospital Outpatient Visit on 03/29/2017   Component Date Value   • Sodium 03/29/2017 139    • Potassium 03/29/2017 3.9    • Chloride 03/29/2017 106    • Co2 03/29/2017 24    • Anion Gap 03/29/2017 9.0    • Glucose 03/29/2017 99    • Bun 03/29/2017 12    • Creatinine 03/29/2017 0.90    • Calcium 03/29/2017 9.3    • AST(SGOT) 03/29/2017 21    • ALT(SGPT) 03/29/2017 25    • Alkaline Phosphatase 03/29/2017 107*   • Total Bilirubin 03/29/2017 0.5    • Albumin 03/29/2017 4.3    • Total Protein 03/29/2017 6.8    • Globulin 03/29/2017 2.5    • A-G Ratio 03/29/2017 1.7    • 25-Hydroxy   Vitamin D 25 03/29/2017 18*   • TSH 03/29/2017 0.940    • Free T-4 03/29/2017 0.93    • HDL 03/29/2017 see below    • Cholesterol,Tot 03/29/2017 266*   • Triglycerides 03/29/2017 1026*   • LDL 03/29/2017 see below    • Testosterone,Total 03/29/2017 284*   • Sex Hormone Bind Globulin 03/29/2017 19    • Free Testosterone 03/29/2017 65    • Testosterone % Free 03/29/2017 2.3    • GFR If  03/29/2017 >60    • GFR If Non  Ameri* 03/29/2017 >60          clinical course has  "been stable    Past Medical History:   Diagnosis Date   • Has stopped breathing 9/21/2013    At night, fatigued all the time   • Hypertriglyceridemia 9/21/2013       No past surgical history on file.    Family History   Problem Relation Age of Onset   • Hypertension Father    • Cancer Paternal Aunt         lung   • Heart Disease Neg Hx    • Diabetes Neg Hx    • Stroke Neg Hx    • Hyperlipidemia Neg Hx        Patient has no known allergies.    Current Outpatient Prescriptions Ordered in [facility]   Medication Sig Dispense Refill   • fenofibrate (TRICOR) 145 MG Tab Take 1 Tab by mouth every day. 30 Tab 3   • zaleplon (SONATA) 5 MG capsule Take 1 Cap by mouth at bedtime as needed for Insomnia for up to 90 days. 30 Cap 2   • omeprazole (PRILOSEC) 20 MG delayed-release capsule Take 1 Cap by mouth every day. 90 Cap 3     No current Epic-ordered facility-administered medications on file.        Constitutional ROS: No unexpected change in weight, No weakness, No unexplained fevers, sweats, or chills.  Positive for insomnia  Pulmonary ROS: No chronic cough, sputum, or hemoptysis, No shortness of breath, No recent change in breathing.  Positive for TOMER  Cardiovascular ROS: No chest pain, No edema, No palpitations, Positive for elevated blood pressure reading  Gastrointestinal ROS: Positive for GERD, per HPI  Musculoskeletal/Extremities ROS: No clubbing, No peripheral edema, No pain, redness or swelling on the joints  Neurologic ROS: Normal development, No seizures, No weakness  Endocrine ROS: Positive per HPI    Physical exam:  /80   Pulse 96   Temp 36.8 °C (98.3 °F)   Resp 16   Ht 1.791 m (5' 10.5\")   Wt 101.2 kg (223 lb)   SpO2 94%   BMI 31.54 kg/m²   General Appearance: Male, alert, no distress, obese, well-groomed  Skin: Skin color, texture, turgor normal. No rashes or lesions.  Lungs: negative findings: normal respiratory rate and rhythm, lungs clear to auscultation  Heart: negative. RRR without murmur, gallop, " or rubs.  No ectopy.  Abdomen: Abdomen soft, non-tender. BS normal. No masses,  No organomegaly  Musculoskeletal: negative findings: ROM of all joints is normal, no evidence of muscle atrophy, no deformities present  Neurologic: intact, CN II through XII grossly intact    Medical decision making/discussion: Patient to have labs done that were ordered prior to this visit before he follows up with me in 2-3 months.  He will be started on omeprazole 20 mg daily on an empty stomach first thing in the morning.  He will also be started on Sonata 5 mg at bedtime for insomnia.  He has a new referral to sleep medicine for further evaluation of TOMER.  We will also have him restart fenofibrate 145 mg daily.  He was given printed education material on high cholesterol as well as GERD.  He is to work on healthy diet, regular physical activity and continued efforts towards weight loss.    Braden was seen today for hyperlipidemia.    Diagnoses and all orders for this visit:    Hypertriglyceridemia  -     fenofibrate (TRICOR) 145 MG Tab; Take 1 Tab by mouth every day.  -     Cancel: COMP METABOLIC PANEL; Future  -     Cancel: LIPID PROFILE; Future    Elevated blood pressure reading  -     Cancel: COMP METABOLIC PANEL; Future  -     Cancel: LIPID PROFILE; Future    Elevated blood sugar  -     Cancel: COMP METABOLIC PANEL; Future  -     Cancel: HEMOGLOBIN A1C; Future    TOMER (obstructive sleep apnea)  -     REFERRAL TO SLEEP STUDIES    Disordered sleep  -     zaleplon (SONATA) 5 MG capsule; Take 1 Cap by mouth at bedtime as needed for Insomnia for up to 90 days.    Gastroesophageal reflux disease, esophagitis presence not specified  -     omeprazole (PRILOSEC) 20 MG delayed-release capsule; Take 1 Cap by mouth every day.    Need for Tdap vaccination  -     TDAP VACCINE =>6YO IM    Obesity (BMI 30-39.9)    BMI 34.0-34.9,adult  -     Patient identified as having weight management issue.  Appropriate orders and counseling  given.          Please note that this dictation was created using voice recognition software. I have made every reasonable attempt to correct obvious errors, but I expect that there are errors of grammar and possibly content that I did not discover before finalizing the note.

## 2018-08-08 NOTE — ASSESSMENT & PLAN NOTE
Patient has brought in outside labs that were done through his employer, triglycerides was noted to be 650 which was the cut off for their lab, the HDL and LDL are unable to be calculated.  He does have a past history of triglycerides being as high as 3036.  He has been on fenofibrate in the past, we will have him restart this and recheck labs again in 3 months.  He was also given printed education material on prevention of high cholesterol.  He was advised to work on dietary changes, increasing physical activity and continue efforts towards weight loss.

## 2018-08-08 NOTE — ASSESSMENT & PLAN NOTE
Patient had elevated blood pressure reading during biometrics done through his employer, blood pressure was noted to be 155/97 on 1 arm and 136/102 in the other.  At the time he states he just got off work, he was very tired and anxious to get out of there.  Today's blood pressure today is 110/80 and he denies symptoms of hypertension.  He has not been treated for hypertension in the past.  We will continue to monitor this.

## 2018-08-08 NOTE — ASSESSMENT & PLAN NOTE
Patient does admit to disordered sleep, he states he has been diagnosed with obstructive sleep apnea in the past but was never given a formal prescription for equipment and has been using his mother's used equipment.  He has been doing this for a few years, however he lost weight and states he was sleeping much better so he stopped using the CPAP.  Unfortunately he has gained weight back and his wife reports that he is snoring again.  He has trouble falling asleep as well as staying asleep.  He is agreeable to be referred back to sleep medicine for a repeat sleep study.  In the interim we discussed the importance of practicing good sleep hygiene and will have him try a short course of Sonata 5 mg at bedtime.

## 2018-08-10 ENCOUNTER — TELEPHONE (OUTPATIENT)
Dept: MEDICAL GROUP | Facility: PHYSICIAN GROUP | Age: 38
End: 2018-08-10

## 2018-08-10 NOTE — TELEPHONE ENCOUNTER
Called [phone] ([place]) lv for patient to call [phone]-oe left from Lilibeth regarding a medication that needed a refill-no medication name left. SK is PCP.

## 2018-08-24 ENCOUNTER — TELEPHONE (OUTPATIENT)
Dept: MEDICAL GROUP | Facility: PHYSICIAN GROUP | Age: 38
End: 2018-08-24

## 2018-08-24 NOTE — TELEPHONE ENCOUNTER
----- Message from Flavia Camejo, Med Ass't sent at 8/10/2018  2:31 PM PDT -----  Regarding: question  Patients wife Lilibeth come regarding zaleplon (SONATA) 5 MG capsule regarding prior auth on the medication.

## 2018-08-24 NOTE — TELEPHONE ENCOUNTER
Follow Up  Called [phone] (home) [phone] (work) [facility] for patient to call [phone]. Regarding PA

## 2018-11-26 PROBLEM — Z87.891 HISTORY OF TOBACCO ABUSE: Status: ACTIVE | Noted: 2018-11-26

## 2018-11-30 ENCOUNTER — OFFICE VISIT (OUTPATIENT)
Dept: URGENT CARE | Facility: PHYSICIAN GROUP | Age: 38
End: 2018-11-30
Payer: COMMERCIAL

## 2018-11-30 VITALS
WEIGHT: 228 LBS | TEMPERATURE: 99.2 F | BODY MASS INDEX: 32.64 KG/M2 | OXYGEN SATURATION: 96 % | RESPIRATION RATE: 16 BRPM | DIASTOLIC BLOOD PRESSURE: 86 MMHG | HEART RATE: 106 BPM | HEIGHT: 70 IN | SYSTOLIC BLOOD PRESSURE: 118 MMHG

## 2018-11-30 DIAGNOSIS — S46.911A ELBOW STRAIN, RIGHT, INITIAL ENCOUNTER: ICD-10-CM

## 2018-11-30 PROCEDURE — 99213 OFFICE O/P EST LOW 20 MIN: CPT | Performed by: PHYSICIAN ASSISTANT

## 2018-11-30 ASSESSMENT — PAIN SCALES - GENERAL: PAINLEVEL: 5=MODERATE PAIN

## 2018-11-30 ASSESSMENT — ENCOUNTER SYMPTOMS
CONSTITUTIONAL NEGATIVE: 1
NEUROLOGICAL NEGATIVE: 1

## 2018-11-30 NOTE — LETTER
November 30, 2018         Patient: Braden Hill   YOB: 1980   Date of Visit: 11/30/2018           To Whom it May Concern:    Braden Hill was seen in my clinic on 11/30/2018. His wife, Lilibeth Hill, was here with him.  Please excuse her from missed work today.     If you have any questions or concerns, please don't hesitate to call.        Sincerely,           Elena Kohler P.A.-C.  Electronically Signed

## 2018-11-30 NOTE — PROGRESS NOTES
"Subjective:      Braden Hill is a 38 y.o. male who presents with Arm Injury (Pt states Poss pulled muscle/ RFA)        Arm Injury     Patient presents with about 12 hours of right elbow/forearm pain. Patient states that he was lifting a \"old, large TV\" in his garage and felt a sudden pain and pull in his left elbow/forearm area.  He states that this has continued into this morning and has noticed swelling and feels that \"some of the muscle disappeared\"  He denies any numbness, tingling, weakness or loss of ROM however does note painful ROM.  He has not attempted any interventions thus far.       Review of Systems   Constitutional: Negative.    Musculoskeletal:        SEE HPI   Skin: Negative.    Neurological: Negative.    Endo/Heme/Allergies: Negative.        PMH:  has a past medical history of Has stopped breathing (9/21/2013) and Hypertriglyceridemia (9/21/2013).  MEDS:   Current Outpatient Prescriptions:   •  fenofibrate (TRICOR) 145 MG Tab, Take 1 Tab by mouth every day., Disp: 30 Tab, Rfl: 3  •  omeprazole (PRILOSEC) 20 MG delayed-release capsule, Take 1 Cap by mouth every day., Disp: 90 Cap, Rfl: 3  ALLERGIES: No Known Allergies  SURGHX: History reviewed. No pertinent surgical history.  SOCHX:  reports that he has been smoking Cigars and Cigarettes.  He started smoking about 9 months ago. He has a 5.00 pack-year smoking history. He quit smokeless tobacco use about 22 months ago. He reports that he drinks alcohol. He reports that he does not use drugs.  FH: Family history was reviewed, no pertinent findings to report   Objective:     /86   Pulse (!) 106   Temp 37.3 °C (99.2 °F) (Temporal)   Resp 16   Ht 1.778 m (5' 10\")   Wt 103.4 kg (228 lb)   SpO2 96%   BMI 32.71 kg/m²      Physical Exam   Constitutional: He is oriented to person, place, and time. He appears well-developed and well-nourished. No distress.   Cardiovascular: Regular rhythm.    Pulmonary/Chest: Effort normal.   Musculoskeletal:   "     Right elbow: He exhibits swelling (slight medial swelling of elbow region. ). He exhibits normal range of motion and no deformity. No radial head, no medial epicondyle, no lateral epicondyle and no olecranon process tenderness noted.        Arms:  Neurological: He is alert and oriented to person, place, and time.   Skin: Skin is warm and dry. No rash noted.   Psychiatric: He has a normal mood and affect. His behavior is normal.   Vitals reviewed.              Assessment/Plan:     1. Elbow strain, right, initial encounter  REFERRAL TO SPORTS MEDICINE       -right elbow strain without any muscular or tendon deformity noted to suggest as severe an injury as a rupture however discussed will start with conservative therapy, ice, elevation, NSAID.  Patient will be in sling for a few days to rest the elbow area.    -Sports Med referral placed and patient will follow up if 2-3 days of conservative therapy fail to start any improvement.       Supportive care, differential diagnoses, and indications for immediate follow-up discussed with patient.   Pathogenesis of diagnosis discussed including typical length and natural progression.   Instructed to return to clinic or nearest emergency department for any change in condition, further concerns, or worsening of symptoms.  Patient states understanding of the plan of care and discharge instructions.      Elena Kohler P.A.-C.

## 2019-03-12 ENCOUNTER — NON-PROVIDER VISIT (OUTPATIENT)
Dept: URGENT CARE | Facility: PHYSICIAN GROUP | Age: 39
End: 2019-03-12

## 2019-03-12 DIAGNOSIS — Z02.1 PRE-EMPLOYMENT DRUG SCREENING: ICD-10-CM

## 2019-03-12 LAB
AMP AMPHETAMINE: NORMAL
COC COCAINE: NORMAL
INT CON NEG: NORMAL
INT CON POS: NORMAL
MET METHAMPHETAMINES: NORMAL
OPI OPIATES: NORMAL
PCP PHENCYCLIDINE: NORMAL
POC DRUG COMMENT 753798-OCCUPATIONAL HEALTH: NORMAL
THC: NORMAL

## 2019-03-12 PROCEDURE — 80305 DRUG TEST PRSMV DIR OPT OBS: CPT | Performed by: FAMILY MEDICINE

## 2019-12-04 ENCOUNTER — HOSPITAL ENCOUNTER (OUTPATIENT)
Dept: LAB | Facility: MEDICAL CENTER | Age: 39
End: 2019-12-04
Attending: FAMILY MEDICINE
Payer: COMMERCIAL

## 2019-12-04 ENCOUNTER — OFFICE VISIT (OUTPATIENT)
Dept: URGENT CARE | Facility: PHYSICIAN GROUP | Age: 39
End: 2019-12-04
Payer: COMMERCIAL

## 2019-12-04 VITALS
DIASTOLIC BLOOD PRESSURE: 86 MMHG | WEIGHT: 227 LBS | BODY MASS INDEX: 32.5 KG/M2 | RESPIRATION RATE: 16 BRPM | HEART RATE: 89 BPM | OXYGEN SATURATION: 97 % | TEMPERATURE: 97.3 F | HEIGHT: 70 IN | SYSTOLIC BLOOD PRESSURE: 132 MMHG

## 2019-12-04 DIAGNOSIS — K04.7 DENTAL INFECTION: ICD-10-CM

## 2019-12-04 DIAGNOSIS — R00.2 PALPITATIONS: ICD-10-CM

## 2019-12-04 DIAGNOSIS — E55.9 VITAMIN D DEFICIENCY: ICD-10-CM

## 2019-12-04 DIAGNOSIS — E78.1 HYPERTRIGLYCERIDEMIA: ICD-10-CM

## 2019-12-04 DIAGNOSIS — R79.89 LOW TESTOSTERONE: ICD-10-CM

## 2019-12-04 LAB
ALBUMIN SERPL BCP-MCNC: 4.5 G/DL (ref 3.2–4.9)
ALBUMIN/GLOB SERPL: 1.7 G/DL
ALP SERPL-CCNC: 81 U/L (ref 30–99)
ALT SERPL-CCNC: 28 U/L (ref 2–50)
ANION GAP SERPL CALC-SCNC: 10 MMOL/L (ref 0–11.9)
AST SERPL-CCNC: 20 U/L (ref 12–45)
BASOPHILS # BLD AUTO: 0.6 % (ref 0–1.8)
BASOPHILS # BLD: 0.04 K/UL (ref 0–0.12)
BILIRUB SERPL-MCNC: 0.5 MG/DL (ref 0.1–1.5)
BUN SERPL-MCNC: 17 MG/DL (ref 8–22)
CALCIUM SERPL-MCNC: 9.4 MG/DL (ref 8.5–10.5)
CHLORIDE SERPL-SCNC: 107 MMOL/L (ref 96–112)
CHOLEST SERPL-MCNC: 334 MG/DL (ref 100–199)
CO2 SERPL-SCNC: 24 MMOL/L (ref 20–33)
CREAT SERPL-MCNC: 0.85 MG/DL (ref 0.5–1.4)
EOSINOPHIL # BLD AUTO: 0.1 K/UL (ref 0–0.51)
EOSINOPHIL NFR BLD: 1.5 % (ref 0–6.9)
ERYTHROCYTE [DISTWIDTH] IN BLOOD BY AUTOMATED COUNT: 39 FL (ref 35.9–50)
GLOBULIN SER CALC-MCNC: 2.6 G/DL (ref 1.9–3.5)
GLUCOSE SERPL-MCNC: 86 MG/DL (ref 65–99)
HCT VFR BLD AUTO: 49.8 % (ref 42–52)
HDLC SERPL-MCNC: ABNORMAL MG/DL
HGB BLD-MCNC: 17.3 G/DL (ref 14–18)
IMM GRANULOCYTES # BLD AUTO: 0.01 K/UL (ref 0–0.11)
IMM GRANULOCYTES NFR BLD AUTO: 0.2 % (ref 0–0.9)
LDLC SERPL CALC-MCNC: ABNORMAL MG/DL
LYMPHOCYTES # BLD AUTO: 2.59 K/UL (ref 1–4.8)
LYMPHOCYTES NFR BLD: 39.7 % (ref 22–41)
MCH RBC QN AUTO: 31.2 PG (ref 27–33)
MCHC RBC AUTO-ENTMCNC: 34.7 G/DL (ref 33.7–35.3)
MCV RBC AUTO: 89.9 FL (ref 81.4–97.8)
MONOCYTES # BLD AUTO: 0.44 K/UL (ref 0–0.85)
MONOCYTES NFR BLD AUTO: 6.7 % (ref 0–13.4)
NEUTROPHILS # BLD AUTO: 3.34 K/UL (ref 1.82–7.42)
NEUTROPHILS NFR BLD: 51.3 % (ref 44–72)
NRBC # BLD AUTO: 0 K/UL
NRBC BLD-RTO: 0 /100 WBC
PLATELET # BLD AUTO: 246 K/UL (ref 164–446)
PMV BLD AUTO: 10.5 FL (ref 9–12.9)
POTASSIUM SERPL-SCNC: 4.2 MMOL/L (ref 3.6–5.5)
PROT SERPL-MCNC: 7.1 G/DL (ref 6–8.2)
RBC # BLD AUTO: 5.54 M/UL (ref 4.7–6.1)
SODIUM SERPL-SCNC: 141 MMOL/L (ref 135–145)
TRIGL SERPL-MCNC: 1357 MG/DL (ref 0–149)
WBC # BLD AUTO: 6.5 K/UL (ref 4.8–10.8)

## 2019-12-04 PROCEDURE — 84403 ASSAY OF TOTAL TESTOSTERONE: CPT

## 2019-12-04 PROCEDURE — 80053 COMPREHEN METABOLIC PANEL: CPT

## 2019-12-04 PROCEDURE — 80061 LIPID PANEL: CPT

## 2019-12-04 PROCEDURE — 99214 OFFICE O/P EST MOD 30 MIN: CPT | Performed by: FAMILY MEDICINE

## 2019-12-04 PROCEDURE — 84443 ASSAY THYROID STIM HORMONE: CPT

## 2019-12-04 PROCEDURE — 85025 COMPLETE CBC W/AUTO DIFF WBC: CPT

## 2019-12-04 PROCEDURE — 82306 VITAMIN D 25 HYDROXY: CPT

## 2019-12-04 PROCEDURE — 36415 COLL VENOUS BLD VENIPUNCTURE: CPT

## 2019-12-04 RX ORDER — HYDROCODONE BITARTRATE AND ACETAMINOPHEN 5; 325 MG/1; MG/1
TABLET ORAL
Qty: 15 TAB | Refills: 0 | Status: SHIPPED | OUTPATIENT
Start: 2019-12-04 | End: 2019-12-09

## 2019-12-04 RX ORDER — AMOXICILLIN AND CLAVULANATE POTASSIUM 875; 125 MG/1; MG/1
TABLET, FILM COATED ORAL
Qty: 20 TAB | Refills: 0 | Status: SHIPPED | OUTPATIENT
Start: 2019-12-04 | End: 2019-12-12

## 2019-12-04 NOTE — PROGRESS NOTES
Chief Complaint:    Chief Complaint   Patient presents with   • Tooth Abscess     x few days   • Other     wants labs       History of Present Illness:    He presents to urgent care today for multiple.    Over the past 1 week, left upper molar is tender. He has had similar problem in past for which Augmentin and Hydrocodone-APAP 5-325 mg have been helpful.    He reports having feeling like he has irregular heart beat and/or palpitations sometimes when trying to go to sleep. I recommended EKG today and/or Cardiology referral, both which he declines today. He denies current active symptoms.    He is requesting comprehensive labs to follow-up on his chronic conditions.      Review of Systems:    Constitutional: Negative for fever, chills, and diaphoresis.   Eyes: Negative for change in vision, photophobia, pain, redness, and discharge.  ENT: See HPI.  Respiratory: Negative for cough, hemoptysis, sputum production, shortness of breath, wheezing, and stridor.    Cardiovascular: See HPI.  Gastrointestinal: Negative for abdominal pain, nausea, vomiting, diarrhea, constipation, blood in stool, and melena.   Genitourinary: Negative for dysuria, urinary urgency, urinary frequency, hematuria, and flank pain.   Musculoskeletal: Negative for myalgias, joint pain, neck pain, and back pain.   Skin: Negative for rash and itching.   Neurological: Negative for dizziness, tingling, tremors, sensory change, speech change, focal weakness, seizures, loss of consciousness, and headaches.   Endo: H/o low testosterone and low Vitamin D.  Heme: Does not bruise/bleed easily.   Psychiatric/Behavioral: Negative for depression, suicidal ideas, hallucinations, memory loss and substance abuse. The patient is not nervous/anxious and does not have insomnia.        Past Medical History:    Past Medical History:   Diagnosis Date   • Has stopped breathing 9/21/2013    At night, fatigued all the time   • Hypertriglyceridemia 9/21/2013     Past Surgical  History:    History reviewed. No pertinent surgical history.    Social History:    Social History     Socioeconomic History   • Marital status: Single     Spouse name: Not on file   • Number of children: Not on file   • Years of education: Not on file   • Highest education level: Not on file   Occupational History   • Not on file   Social Needs   • Financial resource strain: Not on file   • Food insecurity:     Worry: Not on file     Inability: Not on file   • Transportation needs:     Medical: Not on file     Non-medical: Not on file   Tobacco Use   • Smoking status: Current Every Day Smoker     Packs/day: 0.25     Years: 20.00     Pack years: 5.00     Types: Cigars, Cigarettes     Start date: 2/28/2018   • Smokeless tobacco: Former User     Quit date: 1/1/2017   • Tobacco comment: 1 can qod   Substance and Sexual Activity   • Alcohol use: Yes     Comment: Social   • Drug use: No     Comment: denies h/o heroin, cocaine; h/o meth x 4, smoke, quit around 2000.   • Sexual activity: Yes     Partners: Female   Lifestyle   • Physical activity:     Days per week: Not on file     Minutes per session: Not on file   • Stress: Not on file   Relationships   • Social connections:     Talks on phone: Not on file     Gets together: Not on file     Attends [employer] witness service: Not on file     Active member of club or organization: Not on file     Attends meetings of clubs or organizations: Not on file     Relationship status: Not on file   • Intimate partner violence:     Fear of current or ex partner: Not on file     Emotionally abused: Not on file     Physically abused: Not on file     Forced sexual activity: Not on file   Other Topics Concern   • Not on file   Social History Narrative   • Not on file     Family History:    Family History   Problem Relation Age of Onset   • Hypertension Father    • Cancer Paternal Aunt         lung   • Heart Disease Neg Hx    • Diabetes Neg Hx    • Stroke Neg Hx    • Hyperlipidemia Neg Hx   "    Medications:    Current Outpatient Medications on File Prior to Visit   Medication Sig Dispense Refill   • fenofibrate (TRICOR) 145 MG Tab Take 1 Tab by mouth every day. 30 Tab 3   • omeprazole (PRILOSEC) 20 MG delayed-release capsule Take 1 Cap by mouth every day. 90 Cap 3     No current facility-administered medications on file prior to visit.      Allergies:    No Known Allergies      Vitals:    Vitals:    12/04/19 1022   BP: 132/86   BP Location: Right arm   Patient Position: Sitting   BP Cuff Size: Adult   Pulse: 89   Resp: 16   Temp: 36.3 °C (97.3 °F)   TempSrc: Temporal   SpO2: 97%   Weight: 103 kg (227 lb)   Height: 1.778 m (5' 10\")       Physical Exam:    Constitutional: Vital signs reviewed. Appears well-developed and well-nourished. No acute distress.   Eyes: Sclera white, conjunctivae clear.   ENT: Left upper molar and adjacent gum are tender to palpation. External ears normal. External auditory canals normal without discharge. TMs translucent and non-bulging. Hearing normal. Nasal mucosa pink. Lips are normal. Oral mucosa pink and moist. Posterior pharynx: WNL.  Neck: Neck supple.   Cardiovascular: Regular rate and rhythm. No murmur.  Pulmonary/Chest: Respirations non-labored. Clear to auscultation bilaterally.  Lymph: Cervical nodes without tenderness or enlargement.  Musculoskeletal: Normal gait. Normal range of motion. No muscular atrophy or weakness.  Neurological: Alert and oriented to person, place, and time. Muscle tone normal. Coordination normal.   Skin: No rashes or lesions. Warm, dry, normal turgor.  Psychiatric: Normal mood and affect. Behavior is normal. Judgment and thought content normal.       Assessment / Plan:    1. Dental infection  - amoxicillin-clavulanate (AUGMENTIN) 875-125 MG Tab; 1 TAB BY MOUTH TWICE A DAY X 10 DAYS. TAKE WITH FOOD.  Dispense: 20 Tab; Refill: 0  - HYDROcodone-acetaminophen (NORCO) 5-325 MG Tab per tablet; 1 TAB BY MOUTH EVERY 6 HOURS ONLY IF NEEDED FOR PAIN " FOR UP TO 4 DAYS. MAY CAUSE DROWSINESS.  Dispense: 15 Tab; Refill: 0  - Consent for Opiate Prescription    2. Hypertriglyceridemia  - Comp Metabolic Panel; Future  - CBC WITH DIFFERENTIAL; Future  - Lipid Profile; Future  - LDL, DIRECT; Future    3. Low testosterone  - TESTOSTERONE SERUM; Future    4. Palpitations  - Comp Metabolic Panel; Future  - CBC WITH DIFFERENTIAL; Future  - TSH WITH REFLEX TO FT4; Future    5. Vitamin D deficiency  - VITAMIN D,25 HYDROXY; Future      Discussed with him DDX, management options, and risks, benefits, and alternatives to treatment plan agreed upon.    Agreeable to medications prescribed and labs ordered.  report checked - last Rx was Zaleplon 5 mg #30 filled on 11/14/18.    In my professional judgment, after considering each of the factors set forth in , the CS is medically necessary and appropriate.    Advised to see Dentist for definitive treatment.    A follow-up PCP appointment was made for him for 12/12/19.    He will return to urgent care if needed.

## 2019-12-05 LAB
25(OH)D3 SERPL-MCNC: 16 NG/ML (ref 30–100)
TESTOST SERPL-MCNC: 257 NG/DL (ref 175–781)
TSH SERPL DL<=0.005 MIU/L-ACNC: 1.11 UIU/ML (ref 0.38–5.33)

## 2019-12-06 LAB — LDLC SERPL-MCNC: 95 MG/DL (ref 0–129)

## 2019-12-12 ENCOUNTER — OFFICE VISIT (OUTPATIENT)
Dept: MEDICAL GROUP | Facility: PHYSICIAN GROUP | Age: 39
End: 2019-12-12
Payer: COMMERCIAL

## 2019-12-12 VITALS
BODY MASS INDEX: 32.07 KG/M2 | HEART RATE: 105 BPM | DIASTOLIC BLOOD PRESSURE: 86 MMHG | HEIGHT: 70 IN | OXYGEN SATURATION: 97 % | TEMPERATURE: 98 F | WEIGHT: 224 LBS | SYSTOLIC BLOOD PRESSURE: 124 MMHG | RESPIRATION RATE: 18 BRPM

## 2019-12-12 DIAGNOSIS — E78.1 HYPERTRIGLYCERIDEMIA: ICD-10-CM

## 2019-12-12 DIAGNOSIS — R00.2 PALPITATIONS: ICD-10-CM

## 2019-12-12 DIAGNOSIS — G47.9 DISORDERED SLEEP: ICD-10-CM

## 2019-12-12 DIAGNOSIS — G47.33 OSA (OBSTRUCTIVE SLEEP APNEA): ICD-10-CM

## 2019-12-12 DIAGNOSIS — I49.9 IRREGULAR HEART BEAT: ICD-10-CM

## 2019-12-12 DIAGNOSIS — E66.9 OBESITY (BMI 30-39.9): ICD-10-CM

## 2019-12-12 DIAGNOSIS — F33.1 MODERATE EPISODE OF RECURRENT MAJOR DEPRESSIVE DISORDER (HCC): ICD-10-CM

## 2019-12-12 DIAGNOSIS — G47.00 INSOMNIA, UNSPECIFIED TYPE: ICD-10-CM

## 2019-12-12 PROBLEM — F32.9 MAJOR DEPRESSIVE DISORDER: Status: ACTIVE | Noted: 2019-12-12

## 2019-12-12 PROCEDURE — 99214 OFFICE O/P EST MOD 30 MIN: CPT | Performed by: NURSE PRACTITIONER

## 2019-12-12 RX ORDER — FENOFIBRATE 145 MG/1
145 TABLET, COATED ORAL DAILY
Qty: 90 TAB | Refills: 3 | Status: SHIPPED | OUTPATIENT
Start: 2019-12-12 | End: 2021-01-16 | Stop reason: SDUPTHER

## 2019-12-12 RX ORDER — SERTRALINE HYDROCHLORIDE 25 MG/1
50 TABLET, FILM COATED ORAL DAILY
Qty: 180 TAB | Refills: 1 | Status: SHIPPED | OUTPATIENT
Start: 2019-12-12 | End: 2021-01-16

## 2019-12-12 RX ORDER — ZALEPLON 5 MG/1
5 CAPSULE ORAL NIGHTLY PRN
Qty: 30 CAP | Refills: 2 | Status: SHIPPED
Start: 2019-12-12 | End: 2021-06-03 | Stop reason: SDUPTHER

## 2019-12-12 SDOH — HEALTH STABILITY: MENTAL HEALTH: HOW MANY STANDARD DRINKS CONTAINING ALCOHOL DO YOU HAVE ON A TYPICAL DAY?: 3 OR 4

## 2019-12-12 SDOH — HEALTH STABILITY: MENTAL HEALTH: HOW OFTEN DO YOU HAVE A DRINK CONTAINING ALCOHOL?: 2-3 TIMES A WEEK

## 2019-12-12 ASSESSMENT — PATIENT HEALTH QUESTIONNAIRE - PHQ9
5. POOR APPETITE OR OVEREATING: 0 - NOT AT ALL
CLINICAL INTERPRETATION OF PHQ2 SCORE: 2
SUM OF ALL RESPONSES TO PHQ QUESTIONS 1-9: 12

## 2019-12-12 NOTE — PROGRESS NOTES
Chief Complaint   Patient presents with   • Irregular Heart Beat   • Lab Results         This is a 39 y.o.male patient that presents today with the following: Discuss multiple issues, review labs    Hypertriglyceridemia  Patient has significantly elevated triglycerides over 1300 last year and August his triglycerides were over 600.  He has been previously on fenofibrate for some reason or another he has not been taking it, he does agree to restart it and have labs done in 3 months.  We did discuss the importance of lifestyle modifications, healthy diet and efforts towards weight loss.    Irregular heart beat  Patient complains of irregular heartbeat, in the form of palpitations.  In office EKG is reassuring, no evidence of ischemia or arrhythmia.  He does admit to energy drinks in the afternoon, he also has not been sleeping secondary to symptoms very suspicious for TOMER in which she agrees to a sleep study.  He does deny associated chest pain, shortness of breath, diaphoresis or near syncope.  However, we will go ahead and get 48-hour Holter monitor.    Major depressive disorder  Patient does have symptoms consistent with anxiety and depression.  He states he is very overwhelmed with what is going on in his life, there are stressors at home, recently lost a very well pain job and had to take a significant pay cut.  He does adamantly deny suicidal and homicidal ideations, hallucinations, racing thoughts and flights of ideas.  He does agree to starting SSRI and will start sertraline 25 mg once a day for 1 to 2 weeks then increase to 50 mg daily.    Obesity (BMI 30-39.9)  This is a chronic condition, weight is 224 pounds, BMI 32.14, he understands the risks associated with his weight, we discussed weight loss strategies.    TOMER (obstructive sleep apnea)  See additional notes under irregular heartbeat.  Patient has symptoms very consistent with sleep apnea, he does have daytime sleepiness and reports to me that his  significant other says he snores quite loudly and sometimes stops breathing, he does agree to a sleep study.    Insomnia  Patient does have insomnia, he is able to fall asleep but not stay asleep.  States that he finds himself waking up with his mind racing about what is going on in his life.  He also has symptoms very consistent with sleep apnea.  He has been on zaleplon in the past which worked well for him and is requesting a refill which is reasonable.  We again discussed the importance of sleep hygiene.      Hospital Outpatient Visit on 12/04/2019   Component Date Value   • Testosterone,Total 12/04/2019 257    • 25-Hydroxy   Vitamin D 25 12/04/2019 16*   • LDL Direct 12/04/2019 95    • Cholesterol,Tot 12/04/2019 334*   • Triglycerides 12/04/2019 1357*   • HDL 12/04/2019 see below    • LDL 12/04/2019 see below    • TSH 12/04/2019 1.110    • WBC 12/04/2019 6.5    • RBC 12/04/2019 5.54    • Hemoglobin 12/04/2019 17.3    • Hematocrit 12/04/2019 49.8    • MCV 12/04/2019 89.9    • MCH 12/04/2019 31.2    • MCHC 12/04/2019 34.7    • RDW 12/04/2019 39.0    • Platelet Count 12/04/2019 246    • MPV 12/04/2019 10.5    • Neutrophils-Polys 12/04/2019 51.30    • Lymphocytes 12/04/2019 39.70    • Monocytes 12/04/2019 6.70    • Eosinophils 12/04/2019 1.50    • Basophils 12/04/2019 0.60    • Immature Granulocytes 12/04/2019 0.20    • Nucleated RBC 12/04/2019 0.00    • Neutrophils (Absolute) 12/04/2019 3.34    • Lymphs (Absolute) 12/04/2019 2.59    • Monos (Absolute) 12/04/2019 0.44    • Eos (Absolute) 12/04/2019 0.10    • Baso (Absolute) 12/04/2019 0.04    • Immature Granulocytes (a* 12/04/2019 0.01    • NRBC (Absolute) 12/04/2019 0.00    • Sodium 12/04/2019 141    • Potassium 12/04/2019 4.2    • Chloride 12/04/2019 107    • Co2 12/04/2019 24    • Anion Gap 12/04/2019 10.0    • Glucose 12/04/2019 86    • Bun 12/04/2019 17    • Creatinine 12/04/2019 0.85    • Calcium 12/04/2019 9.4    • AST(SGOT) 12/04/2019 20    • ALT(SGPT)  "12/04/2019 28    • Alkaline Phosphatase 12/04/2019 81    • Total Bilirubin 12/04/2019 0.5    • Albumin 12/04/2019 4.5    • Total Protein 12/04/2019 7.1    • Globulin 12/04/2019 2.6    • A-G Ratio 12/04/2019 1.7    • GFR If  12/04/2019 >60    • GFR If Non  Ameri* 12/04/2019 >60          clinical course has been stable    Past Medical History:   Diagnosis Date   • Has stopped breathing 9/21/2013    At night, fatigued all the time   • Hypertriglyceridemia 9/21/2013       No past surgical history on file.    Family History   Problem Relation Age of Onset   • Hypertension Father    • Cancer Paternal Aunt         lung   • Heart Disease Neg Hx    • Diabetes Neg Hx    • Stroke Neg Hx    • Hyperlipidemia Neg Hx        Patient has no known allergies.    Current Outpatient Medications Ordered in Epic   Medication Sig Dispense Refill   • fenofibrate (TRICOR) 145 MG Tab Take 1 Tab by mouth every day. 90 Tab 3   • sertraline (ZOLOFT) 25 MG tablet Take 2 Tabs by mouth every day. 180 Tab 1   • zaleplon (SONATA) 5 MG capsule Take 1 Cap by mouth at bedtime as needed for Insomnia for up to 90 days. 30 Cap 2     No current Epic-ordered facility-administered medications on file.        Constitutional ROS: No unexpected change in weight, No weakness, No unexplained fevers, sweats, or chills  Pulmonary ROS: Positive for TOMER, everyday smoker  Cardiovascular ROS: Positive per HPI  Gastrointestinal ROS: No abdominal pain, No nausea, vomiting, diarrhea, or constipation  Musculoskeletal/Extremities ROS: No clubbing, No peripheral edema, No pain, redness or swelling on the joints  Neurologic ROS: Normal development, No seizures, No weakness  Psychiatric ROS: Positive for depression    Physical exam:  /86   Pulse (!) 105   Temp 36.7 °C (98 °F) (Temporal)   Resp 18   Ht 1.778 m (5' 10\")   Wt 101.6 kg (224 lb)   SpO2 97%   BMI 32.14 kg/m²   General Appearance: Very pleasant middle-age male, alert, no distress, " obese, well-groomed  Skin: Skin color, texture, turgor normal. No rashes or lesions.  Lungs: negative findings: normal respiratory rate and rhythm, lungs clear to auscultation  Heart: negative. RRR without murmur, gallop, or rubs.  No ectopy.  Abdomen: Abdomen soft, non-tender. BS normal. No masses,  No organomegaly  Musculoskeletal: negative findings: no evidence of joint instability, no evidence of muscle atrophy, no deformities present  Neurologic: intact, CN II through XII grossly intact    Medical decision making/discussion: Patient does agree to starting sertraline 25 mg daily for 1 week then increase to 50 mg.  We will also give him a renewal of Sonata for insomnia.  He does agree to referrals for Holter monitor as well as to sleep medicine for further evaluation and treatment of TOMER.  He does agree to also starting fenofibrate in the setting of a significantly elevated triglycerides.  We discussed the importance of healthy eating, regular exercise and efforts towards weight loss.  He is going to follow-up with me in 3 months with labs done before visit.    Braden was seen today for irregular heart beat and lab results.    Diagnoses and all orders for this visit:    Irregular heart beat  -     HOLTER - Cardiology Performed (48HR); Future    Palpitations  -     HOLTER - Cardiology Performed (48HR); Future    Moderate episode of recurrent major depressive disorder (HCC)  -     Patient has been identified as having a positive depression screening. Appropriate orders and counseling have been given.  -     sertraline (ZOLOFT) 25 MG tablet; Take 2 Tabs by mouth every day.    TOMER (obstructive sleep apnea)  -     REFERRAL TO SLEEP STUDIES    Hypertriglyceridemia  -     Lipid Profile; Future  -     fenofibrate (TRICOR) 145 MG Tab; Take 1 Tab by mouth every day.    Disordered sleep    Insomnia, unspecified type  -     zaleplon (SONATA) 5 MG capsule; Take 1 Cap by mouth at bedtime as needed for Insomnia for up to 90  days.    Obesity (BMI 30-39.9)  -     Patient identified as having weight management issue.  Appropriate orders and counseling given.        Return in about 3 months (around 3/12/2020) for Follow-up, Discuss Labs.        Please note that this dictation was created using voice recognition software. I have made every reasonable attempt to correct obvious errors, but I expect that there are errors of grammar and possibly content that I did not discover before finalizing the note.

## 2019-12-12 NOTE — PATIENT INSTRUCTIONS
Take at least 5000 IU daily of vitamin D    Will have you start fenofibrate    Referral to sleep medicine    Follow up with me in 3 months labs

## 2019-12-16 PROBLEM — G47.00 INSOMNIA: Status: ACTIVE | Noted: 2019-12-16

## 2019-12-16 NOTE — ASSESSMENT & PLAN NOTE
Patient does have insomnia, he is able to fall asleep but not stay asleep.  States that he finds himself waking up with his mind racing about what is going on in his life.  He also has symptoms very consistent with sleep apnea.  He has been on zaleplon in the past which worked well for him and is requesting a refill which is reasonable.  We again discussed the importance of sleep hygiene.

## 2019-12-16 NOTE — ASSESSMENT & PLAN NOTE
See additional notes under irregular heartbeat.  Patient has symptoms very consistent with sleep apnea, he does have daytime sleepiness and reports to me that his significant other says he snores quite loudly and sometimes stops breathing, he does agree to a sleep study.

## 2019-12-16 NOTE — ASSESSMENT & PLAN NOTE
Patient does have symptoms consistent with anxiety and depression.  He states he is very overwhelmed with what is going on in his life, there are stressors at home, recently lost a very well pain job and had to take a significant pay cut.  He does adamantly deny suicidal and homicidal ideations, hallucinations, racing thoughts and flights of ideas.  He does agree to starting SSRI and will start sertraline 25 mg once a day for 1 to 2 weeks then increase to 50 mg daily.

## 2019-12-16 NOTE — ASSESSMENT & PLAN NOTE
This is a chronic condition, weight is 224 pounds, BMI 32.14, he understands the risks associated with his weight, we discussed weight loss strategies.

## 2019-12-16 NOTE — ASSESSMENT & PLAN NOTE
Patient has significantly elevated triglycerides over 1300 last year and August his triglycerides were over 600.  He has been previously on fenofibrate for some reason or another he has not been taking it, he does agree to restart it and have labs done in 3 months.  We did discuss the importance of lifestyle modifications, healthy diet and efforts towards weight loss.

## 2019-12-16 NOTE — ASSESSMENT & PLAN NOTE
Patient complains of irregular heartbeat, in the form of palpitations.  In office EKG is reassuring, no evidence of ischemia or arrhythmia.  He does admit to energy drinks in the afternoon, he also has not been sleeping secondary to symptoms very suspicious for TOMER in which she agrees to a sleep study.  He does deny associated chest pain, shortness of breath, diaphoresis or near syncope.  However, we will go ahead and get 48-hour Holter monitor.

## 2019-12-26 ENCOUNTER — TELEPHONE (OUTPATIENT)
Dept: MEDICAL GROUP | Facility: PHYSICIAN GROUP | Age: 39
End: 2019-12-26

## 2019-12-26 NOTE — TELEPHONE ENCOUNTER
MEDICATION PRIOR AUTHORIZATION NEEDED:    1. Name of Medication:  Zaleplon 5MG capsules       2. Requested By (Name of Pharmacy): [facility]     3. Is insurance on file current? Yes    4. What is the name & phone number of the 3rd party payor? Cover My Meds    Key: [id number] - PA Case ID: [id number] - Rx #: [id number]  Key:   [id number]

## 2021-01-16 ENCOUNTER — OFFICE VISIT (OUTPATIENT)
Dept: URGENT CARE | Facility: PHYSICIAN GROUP | Age: 41
End: 2021-01-16
Payer: COMMERCIAL

## 2021-01-16 ENCOUNTER — HOSPITAL ENCOUNTER (OUTPATIENT)
Dept: RADIOLOGY | Facility: MEDICAL CENTER | Age: 41
End: 2021-01-16
Attending: FAMILY MEDICINE
Payer: COMMERCIAL

## 2021-01-16 VITALS
HEIGHT: 70 IN | HEART RATE: 92 BPM | DIASTOLIC BLOOD PRESSURE: 90 MMHG | WEIGHT: 224 LBS | TEMPERATURE: 98.5 F | OXYGEN SATURATION: 97 % | BODY MASS INDEX: 32.07 KG/M2 | SYSTOLIC BLOOD PRESSURE: 138 MMHG | RESPIRATION RATE: 16 BRPM

## 2021-01-16 DIAGNOSIS — R19.09 GROIN SWELLING: ICD-10-CM

## 2021-01-16 DIAGNOSIS — R10.32 LEFT GROIN PAIN: ICD-10-CM

## 2021-01-16 DIAGNOSIS — Z76.0 ENCOUNTER FOR MEDICATION REFILL: ICD-10-CM

## 2021-01-16 DIAGNOSIS — K40.20 NON-RECURRENT BILATERAL INGUINAL HERNIA WITHOUT OBSTRUCTION OR GANGRENE: ICD-10-CM

## 2021-01-16 DIAGNOSIS — E78.1 HYPERTRIGLYCERIDEMIA: ICD-10-CM

## 2021-01-16 PROCEDURE — 99214 OFFICE O/P EST MOD 30 MIN: CPT | Performed by: FAMILY MEDICINE

## 2021-01-16 PROCEDURE — 76857 US EXAM PELVIC LIMITED: CPT

## 2021-01-16 RX ORDER — FENOFIBRATE 145 MG/1
145 TABLET, COATED ORAL DAILY
Qty: 90 TAB | Refills: 0 | Status: SHIPPED | OUTPATIENT
Start: 2021-01-16 | End: 2021-02-04 | Stop reason: SDUPTHER

## 2021-01-16 SDOH — HEALTH STABILITY: MENTAL HEALTH: HOW OFTEN DO YOU HAVE A DRINK CONTAINING ALCOHOL?: 2-4 TIMES A MONTH

## 2021-01-16 ASSESSMENT — FIBROSIS 4 INDEX: FIB4 SCORE: 0.63

## 2021-01-16 NOTE — PROGRESS NOTES
Chief Complaint:    Chief Complaint   Patient presents with   • Possible Hernia     L, Side has a bump, x3days, causing discomfort    • Medication Refill     Fenofibrate        History of Present Illness:    This is a new problem. For 3-4 days, he is having pain and swelling in left groin region, he is concerned about possible hernia. OTC meds have not been helpful for the pain. He is requesting refill of Fenofibrate, Triglycerides were 1357 on 12/4/19.      Review of Systems:    Constitutional: Negative for fever, chills, and diaphoresis.   Eyes: Negative for change in vision, photophobia, pain, redness, and discharge.  ENT: Negative for ear pain, ear discharge, hearing loss, tinnitus, nasal congestion, nosebleeds, and sore throat.    Respiratory: Negative for cough, hemoptysis, sputum production, shortness of breath, wheezing, and stridor.    Cardiovascular: See HPI. Negative for chest pain, palpitations, orthopnea, claudication, leg swelling, and PND.   Gastrointestinal: Negative for nausea, vomiting, diarrhea, constipation, blood in stool, and melena.   Genitourinary: Negative for dysuria, urinary urgency, urinary frequency, hematuria, and flank pain.   Musculoskeletal: See HPI.   Skin: Negative for rash and itching.   Neurological: Negative for dizziness, tingling, tremors, sensory change, speech change, focal weakness, seizures, loss of consciousness, and headaches.   Endo: Negative for polydipsia.   Heme: Does not bruise/bleed easily.   Psychiatric/Behavioral: Negative for depression, suicidal ideas, hallucinations, memory loss and substance abuse. The patient is not nervous/anxious and does not have insomnia.        Past Medical History:    Past Medical History:   Diagnosis Date   • Has stopped breathing 9/21/2013    At night, fatigued all the time   • Hypertriglyceridemia 9/21/2013     Past Surgical History:    No past surgical history on file.    Social History:    Social History     Socioeconomic History    • Marital status: Single     Spouse name: Not on file   • Number of children: Not on file   • Years of education: Not on file   • Highest education level: Not on file   Occupational History   • Not on file   Social Needs   • Financial resource strain: Not on file   • Food insecurity     Worry: Not on file     Inability: Not on file   • Transportation needs     Medical: Not on file     Non-medical: Not on file   Tobacco Use   • Smoking status: Former Smoker     Packs/day: 0.50     Years: 20.00     Pack years: 10.00     Types: Cigars, Cigarettes     Start date: 2/28/2018   • Smokeless tobacco: Former User     Quit date: 1/1/2017   • Tobacco comment: 1 can qod   Substance and Sexual Activity   • Alcohol use: Yes     Frequency: 2-4 times a month     Drinks per session: 3 or 4   • Drug use: Yes     Types: Marijuana     Comment: denies h/o heroin, cocaine; h/o meth x 4, smoke, quit around 2000.   • Sexual activity: Yes     Partners: Female     Comment:  / Wife    Lifestyle   • Physical activity     Days per week: Not on file     Minutes per session: Not on file   • Stress: Not on file   Relationships   • Social connections     Talks on phone: Not on file     Gets together: Not on file     Attends Jehovah's witness service: Not on file     Active member of club or organization: Not on file     Attends meetings of clubs or organizations: Not on file     Relationship status: Not on file   • Intimate partner violence     Fear of current or ex partner: Not on file     Emotionally abused: Not on file     Physically abused: Not on file     Forced sexual activity: Not on file   Other Topics Concern   • Not on file   Social History Narrative   • Not on file     Family History:    Family History   Problem Relation Age of Onset   • Hypertension Father    • Cancer Paternal Aunt         lung   • Heart Disease Neg Hx    • Diabetes Neg Hx    • Stroke Neg Hx    • Hyperlipidemia Neg Hx      Medications:    Current Outpatient Medications  "on File Prior to Visit   Medication Sig Dispense Refill   • fenofibrate (TRICOR) 145 MG Tab Take 1 Tab by mouth every day. 90 Tab 3     No current facility-administered medications on file prior to visit.      Allergies:    No Known Allergies      Vitals:    Vitals:    01/16/21 1034   BP: 138/90   Pulse: 92   Resp: 16   Temp: 36.9 °C (98.5 °F)   TempSrc: Temporal   SpO2: 97%   Weight: 101.6 kg (224 lb)   Height: 1.778 m (5' 10\")       Physical Exam:    Constitutional: Vital signs reviewed. Appears well-developed and well-nourished. No acute distress.   Eyes: Sclera white, conjunctivae clear.  ENT: External ears normal. Hearing normal.   Neck: Neck supple.   Pulmonary/Chest: Respirations non-labored.   Abdomen: Bowel sounds are normal active. Soft, non-distended, and non-tender to palpation. Left inguinal region there is a mild bulge that can be seen and felt externally, not felt in the left inguinal canal with groin exam.  Musculoskeletal: Normal gait. Normal range of motion. No muscular atrophy or weakness.  Neurological: Alert and oriented to person, place, and time. Muscle tone normal. Coordination normal. Light touch and sensation normal.   Skin: No rashes or lesions. Warm, dry, normal turgor.  Psychiatric: Normal mood and affect. Behavior is normal. Judgment and thought content normal.       Assessment / Plan:    1. Left groin pain  - US-INGUINAL HERNIA; Future    2. Groin swelling  - US-INGUINAL HERNIA; Future    3. Hypertriglyceridemia  - fenofibrate (TRICOR) 145 MG Tab; Take 1 Tab by mouth every day.  Dispense: 90 Tab; Refill: 0    4. Encounter for medication refill  - fenofibrate (TRICOR) 145 MG Tab; Take 1 Tab by mouth every day.  Dispense: 90 Tab; Refill: 0      Discussed with him DDX, management options, and risks, benefits, and alternatives to treatment plan agreed upon.    I do not feel a bulge in his left inguinal canal on groin exam but there is a mild bulge seen and felt higher up on lower abdominal " exam. This may be a hernia.    Agreeable to US ordered to check for left inguinal hernia and medication prescribed.    Says to call [phone] with US result.    May use OTC Tylenol and/or NSAID prn pain.    He will follow-up if needed while waiting for US result.

## 2021-01-17 ENCOUNTER — PATIENT MESSAGE (OUTPATIENT)
Dept: URGENT CARE | Facility: PHYSICIAN GROUP | Age: 41
End: 2021-01-17

## 2021-01-17 ENCOUNTER — PATIENT MESSAGE (OUTPATIENT)
Dept: MEDICAL GROUP | Facility: PHYSICIAN GROUP | Age: 41
End: 2021-01-17

## 2021-01-18 ENCOUNTER — TELEPHONE (OUTPATIENT)
Dept: MEDICAL GROUP | Facility: PHYSICIAN GROUP | Age: 41
End: 2021-01-18

## 2021-01-19 ENCOUNTER — OFFICE VISIT (OUTPATIENT)
Dept: MEDICAL GROUP | Facility: PHYSICIAN GROUP | Age: 41
End: 2021-01-19
Payer: COMMERCIAL

## 2021-01-19 VITALS
BODY MASS INDEX: 32.21 KG/M2 | OXYGEN SATURATION: 96 % | HEART RATE: 86 BPM | DIASTOLIC BLOOD PRESSURE: 90 MMHG | RESPIRATION RATE: 14 BRPM | TEMPERATURE: 98.1 F | HEIGHT: 70 IN | SYSTOLIC BLOOD PRESSURE: 126 MMHG | WEIGHT: 225 LBS

## 2021-01-19 DIAGNOSIS — R03.0 ELEVATED BLOOD PRESSURE READING: ICD-10-CM

## 2021-01-19 DIAGNOSIS — R73.9 ELEVATED BLOOD SUGAR: ICD-10-CM

## 2021-01-19 DIAGNOSIS — K40.20 NON-RECURRENT BILATERAL INGUINAL HERNIA WITHOUT OBSTRUCTION OR GANGRENE: ICD-10-CM

## 2021-01-19 DIAGNOSIS — E78.1 HYPERTRIGLYCERIDEMIA: ICD-10-CM

## 2021-01-19 PROCEDURE — 99214 OFFICE O/P EST MOD 30 MIN: CPT | Performed by: NURSE PRACTITIONER

## 2021-01-19 RX ORDER — NAPROXEN 500 MG/1
500 TABLET ORAL 2 TIMES DAILY WITH MEALS
Qty: 60 TAB | Refills: 0 | Status: SHIPPED | OUTPATIENT
Start: 2021-01-19 | End: 2021-02-16

## 2021-01-19 RX ORDER — TIZANIDINE 4 MG/1
4 TABLET ORAL 3 TIMES DAILY
Qty: 60 TAB | Refills: 0 | Status: SHIPPED | OUTPATIENT
Start: 2021-01-19 | End: 2021-02-16

## 2021-01-19 ASSESSMENT — FIBROSIS 4 INDEX: FIB4 SCORE: 0.63

## 2021-01-19 ASSESSMENT — PAIN SCALES - GENERAL: PAINLEVEL: 6=MODERATE PAIN

## 2021-01-19 NOTE — ASSESSMENT & PLAN NOTE
Patient recently seen in urgent care for left groin pain  Ultrasound was ordered he was found to have bilateral fat-containing reducible inguinal hernias  He is having quite a bit of discomfort associated with this  He is requesting something for pain is Tylenol and ibuprofen are not helping  Naproxen was called in earlier today, will add muscle relaxer  Advised not to drive while taking muscle relaxer and to take naproxen with food  Patient does have an upcoming appointment on Friday with surgeon for consultation  Discussed the importance of ER precautions should his symptoms worsen  Patient given printed education material on inguinal hernias

## 2021-01-19 NOTE — ASSESSMENT & PLAN NOTE
Patient does have history of elevated fasting glucose without diagnosis of type 2 diabetes or prediabetes  Has not been seen in well over a year and is past due for labs, has been ordered

## 2021-01-19 NOTE — TELEPHONE ENCOUNTER
[phone]    I have called and spoke with patient who stated he is having a constant pain no issues eating or drink but a little issue with using the restroom.     There was an opening at 130 patient will be coming in

## 2021-01-19 NOTE — ASSESSMENT & PLAN NOTE
The ASCVD Risk score (Patrickrobert BARRIENTOS Jr, et al., 2013) failed to calculate.  Patient not currently on statin but he is on fenofibrate  Past due for labs  Discussed the importance of ongoing healthy lifestyle modifications

## 2021-01-19 NOTE — ASSESSMENT & PLAN NOTE
Patient does have past history of elevated blood pressure reading without diagnosis of hypertension  Blood pressure today very mildly elevated 126/90, denies symptoms of hypertension  Due for labs

## 2021-01-19 NOTE — TELEPHONE ENCOUNTER
If his pain is severe, he cannot reduce the hernia (it won't go back in when pressure applied) and cannot keep food or fluids down, will need to go to ER, the worry would be for a strangulated hernia. Cannot prescribe anything controlled without an appointment. I will call in naproxen, can take 1 pill twice a day with food, can continue to take tylenol in between

## 2021-01-19 NOTE — PROGRESS NOTES
Chief Complaint   Patient presents with   • Hernia         This is a 40 y.o.male patient that presents today with the following: Follow-up, wants to discuss hernias    Elevated blood sugar  Patient does have history of elevated fasting glucose without diagnosis of type 2 diabetes or prediabetes  Has not been seen in well over a year and is past due for labs, has been ordered    Hypertriglyceridemia  The ASCVD Risk score (Bainbridge ILIANA Jr, et al., 2013) failed to calculate.  Patient not currently on statin but he is on fenofibrate  Past due for labs  Discussed the importance of ongoing healthy lifestyle modifications    Elevated blood pressure reading  Patient does have past history of elevated blood pressure reading without diagnosis of hypertension  Blood pressure today very mildly elevated 126/90, denies symptoms of hypertension  Due for labs    Non-recurrent bilateral inguinal hernia without obstruction or gangrene  Patient recently seen in urgent care for left groin pain  Ultrasound was ordered he was found to have bilateral fat-containing reducible inguinal hernias  He is having quite a bit of discomfort associated with this  He is requesting something for pain is Tylenol and ibuprofen are not helping  Naproxen was called in earlier today, will add muscle relaxer  Advised not to drive while taking muscle relaxer and to take naproxen with food  Patient does have an upcoming appointment on Friday with surgeon for consultation  Discussed the importance of ER precautions should his symptoms worsen  Patient given printed education material on inguinal hernias      No visits with results within 1 Month(s) from this visit.   Latest known visit with results is:   Hospital Outpatient Visit on 12/04/2019   Component Date Value   • Testosterone,Total 12/04/2019 257    • 25-Hydroxy   Vitamin D 25 12/04/2019 16*   • LDL Direct 12/04/2019 95    • Cholesterol,Tot 12/04/2019 334*   • Triglycerides 12/04/2019 1357*   • HDL 12/04/2019  see below    • LDL 12/04/2019 see below    • TSH 12/04/2019 1.110    • WBC 12/04/2019 6.5    • RBC 12/04/2019 5.54    • Hemoglobin 12/04/2019 17.3    • Hematocrit 12/04/2019 49.8    • MCV 12/04/2019 89.9    • MCH 12/04/2019 31.2    • MCHC 12/04/2019 34.7    • RDW 12/04/2019 39.0    • Platelet Count 12/04/2019 246    • MPV 12/04/2019 10.5    • Neutrophils-Polys 12/04/2019 51.30    • Lymphocytes 12/04/2019 39.70    • Monocytes 12/04/2019 6.70    • Eosinophils 12/04/2019 1.50    • Basophils 12/04/2019 0.60    • Immature Granulocytes 12/04/2019 0.20    • Nucleated RBC 12/04/2019 0.00    • Neutrophils (Absolute) 12/04/2019 3.34    • Lymphs (Absolute) 12/04/2019 2.59    • Monos (Absolute) 12/04/2019 0.44    • Eos (Absolute) 12/04/2019 0.10    • Baso (Absolute) 12/04/2019 0.04    • Immature Granulocytes (a* 12/04/2019 0.01    • NRBC (Absolute) 12/04/2019 0.00    • Sodium 12/04/2019 141    • Potassium 12/04/2019 4.2    • Chloride 12/04/2019 107    • Co2 12/04/2019 24    • Anion Gap 12/04/2019 10.0    • Glucose 12/04/2019 86    • Bun 12/04/2019 17    • Creatinine 12/04/2019 0.85    • Calcium 12/04/2019 9.4    • AST(SGOT) 12/04/2019 20    • ALT(SGPT) 12/04/2019 28    • Alkaline Phosphatase 12/04/2019 81    • Total Bilirubin 12/04/2019 0.5    • Albumin 12/04/2019 4.5    • Total Protein 12/04/2019 7.1    • Globulin 12/04/2019 2.6    • A-G Ratio 12/04/2019 1.7    • GFR If  12/04/2019 >60    • GFR If Non  Ameri* 12/04/2019 >60          clinical course has been stable    Past Medical History:   Diagnosis Date   • Has stopped breathing 9/21/2013    At night, fatigued all the time   • Hypertriglyceridemia 9/21/2013       History reviewed. No pertinent surgical history.    Family History   Problem Relation Age of Onset   • Hypertension Father    • Cancer Paternal Aunt         lung   • Heart Disease Neg Hx    • Diabetes Neg Hx    • Stroke Neg Hx    • Hyperlipidemia Neg Hx        Patient has no known  "allergies.    Current Outpatient Medications Ordered in Epic   Medication Sig Dispense Refill   • tizanidine (ZANAFLEX) 4 MG Tab Take 1 Tab by mouth 3 times a day. 60 Tab 0   • fenofibrate (TRICOR) 145 MG Tab Take 1 Tab by mouth every day. 90 Tab 0   • naproxen (NAPROSYN) 500 MG Tab Take 1 Tab by mouth 2 times a day, with meals. (Patient not taking: Reported on 1/19/2021) 60 Tab 0     No current Epic-ordered facility-administered medications on file.        Constitutional ROS: No unexpected change in weight, No weakness, No unexplained fevers, sweats, or chills  Pulmonary ROS: No chronic cough, sputum, or hemoptysis, No shortness of breath, No recent change in breathing  Cardiovascular ROS: No chest pain  Gastrointestinal ROS: Positive per HPI  Musculoskeletal/Extremities ROS: No clubbing, No peripheral edema, No pain, redness or swelling on the joints  Neurologic ROS: Normal development, No seizures, No weakness  Endocrine ROS: Positive per HPI    Physical exam:  /90   Pulse 86   Temp 36.7 °C (98.1 °F) (Temporal)   Resp 14   Ht 1.778 m (5' 10\")   Wt 102.1 kg (225 lb)   SpO2 96%   BMI 32.28 kg/m²   General Appearance: Pleasant middle-aged male, alert, no distress, obese, well-groomed  Skin: Skin color, texture, turgor normal. No rashes or lesions.  Lungs: negative findings: normal respiratory rate and rhythm, normal effort  Abdomen: positive findings: Bilateral inguinal hernias  Musculoskeletal: negative findings: no evidence of joint instability, no evidence of muscle atrophy, no deformities present  Neurologic: intact, CN II through XII grossly intact    Medical decision making/discussion:     Discussed at length the nature of inguinal hernias, advised to abstain from any labors activity especially with lifting involved.  He was strongly advised to make sure he keeps his upcoming appointment with general surgeon on Friday for consultation.  He was also given printed education material on inguinal " hernias.  Past due for labs, these have been ordered    Braden was seen today for hernia.    Diagnoses and all orders for this visit:    Non-recurrent bilateral inguinal hernia without obstruction or gangrene  -     tizanidine (ZANAFLEX) 4 MG Tab; Take 1 Tab by mouth 3 times a day.    Hypertriglyceridemia  -     Comp Metabolic Panel; Future  -     Lipid Profile; Future    Elevated blood pressure reading  -     CBC WITH DIFFERENTIAL; Future    Elevated blood sugar  -     HEMOGLOBIN A1C; Future        Return if symptoms worsen or fail to improve, for Follow-up.        Please note that this dictation was created using voice recognition software. I have made every reasonable attempt to correct obvious errors, but I expect that there are errors of grammar and possibly content that I did not discover before finalizing the note.

## 2021-01-19 NOTE — TELEPHONE ENCOUNTER
Patient called and stated his Heria is very painful her has tried and failed ibuprofen and Tylenol. Patient has an aupcoming appointment Thjacob but would like to know if you can send something for pain over before this.    Please advise     [phone] (home)

## 2021-01-21 NOTE — PATIENT COMMUNICATION
Patient was seen in primary care this week and was provided RX and stated he has a [facility] with [facility] this week.     No further action needed at this time

## 2021-01-22 ENCOUNTER — HOSPITAL ENCOUNTER (OUTPATIENT)
Facility: MEDICAL CENTER | Age: 41
End: 2021-01-22
Attending: SURGERY | Admitting: SURGERY

## 2021-01-23 ENCOUNTER — PRE-ADMISSION TESTING (OUTPATIENT)
Dept: ADMISSIONS | Facility: MEDICAL CENTER | Age: 41
End: 2021-01-23
Attending: SURGERY
Payer: COMMERCIAL

## 2021-01-23 DIAGNOSIS — Z01.812 PRE-OPERATIVE LABORATORY EXAMINATION: ICD-10-CM

## 2021-01-23 PROCEDURE — U0003 INFECTIOUS AGENT DETECTION BY NUCLEIC ACID (DNA OR RNA); SEVERE ACUTE RESPIRATORY SYNDROME CORONAVIRUS 2 (SARS-COV-2) (CORONAVIRUS DISEASE [COVID-19]), AMPLIFIED PROBE TECHNIQUE, MAKING USE OF HIGH THROUGHPUT TECHNOLOGIES AS DESCRIBED BY CMS-2020-01-R: HCPCS

## 2021-01-23 PROCEDURE — U0005 INFEC AGEN DETEC AMPLI PROBE: HCPCS

## 2021-01-23 PROCEDURE — C9803 HOPD COVID-19 SPEC COLLECT: HCPCS

## 2021-01-24 LAB
COVID ORDER STATUS COVID19: NORMAL
SARS-COV-2 RNA RESP QL NAA+PROBE: NOTDETECTED
SPECIMEN SOURCE: NORMAL

## 2021-01-26 ENCOUNTER — APPOINTMENT (OUTPATIENT)
Dept: ADMISSIONS | Facility: MEDICAL CENTER | Age: 41
End: 2021-01-26
Attending: SURGERY

## 2021-01-26 ENCOUNTER — PRE-ADMISSION TESTING (OUTPATIENT)
Dept: ADMISSIONS | Facility: MEDICAL CENTER | Age: 41
End: 2021-01-26
Payer: COMMERCIAL

## 2021-01-26 SDOH — HEALTH STABILITY: MENTAL HEALTH: HOW OFTEN DO YOU HAVE A DRINK CONTAINING ALCOHOL?: 2-3 TIMES A WEEK

## 2021-01-26 NOTE — OR NURSING
COVID-19 Pre-surgery screening:  ?     1. Do you have an undiagnosed respiratory illness or symptoms such as coughing or sneezing?      No     · Onset of Sx:  N/a  · Acute vs. chronic respiratory illness: N/a     2. Do you have an unexplained fever greater than 100.4 degrees Fahrenheit or 38 degrees Celsius?                  No     3. Have you had direct exposure to a patient who tested positive for Covid-19?                  No  ?  4. Have you had any loss of your sense of taste or smell? Have you had N/V or sore throat?     No     Patient has been informed of visitor policy and asked to wear a mask upon entering the hospital

## 2021-02-04 ENCOUNTER — TELEPHONE (OUTPATIENT)
Dept: MEDICAL GROUP | Facility: PHYSICIAN GROUP | Age: 41
End: 2021-02-04

## 2021-02-04 DIAGNOSIS — E78.1 HYPERTRIGLYCERIDEMIA: ICD-10-CM

## 2021-02-04 DIAGNOSIS — Z76.0 ENCOUNTER FOR MEDICATION REFILL: ICD-10-CM

## 2021-02-04 RX ORDER — FENOFIBRATE 145 MG/1
145 TABLET, COATED ORAL DAILY
Qty: 90 TAB | Refills: 0 | Status: SHIPPED | OUTPATIENT
Start: 2021-02-04 | End: 2021-08-31

## 2021-02-16 ENCOUNTER — APPOINTMENT (OUTPATIENT)
Dept: ADMISSIONS | Facility: MEDICAL CENTER | Age: 41
End: 2021-02-16
Payer: COMMERCIAL

## 2021-02-16 ENCOUNTER — PRE-ADMISSION TESTING (OUTPATIENT)
Dept: ADMISSIONS | Facility: MEDICAL CENTER | Age: 41
End: 2021-02-16
Attending: SURGERY
Payer: COMMERCIAL

## 2021-02-16 DIAGNOSIS — Z01.812 PRE-OPERATIVE LABORATORY EXAMINATION: ICD-10-CM

## 2021-02-16 DIAGNOSIS — Z01.810 PRE-OPERATIVE CARDIOVASCULAR EXAMINATION: ICD-10-CM

## 2021-02-16 LAB
ALBUMIN SERPL BCP-MCNC: 4.6 G/DL (ref 3.2–4.9)
ALBUMIN/GLOB SERPL: 1.6 G/DL
ALP SERPL-CCNC: 81 U/L (ref 30–99)
ALT SERPL-CCNC: 33 U/L (ref 2–50)
ANION GAP SERPL CALC-SCNC: 15 MMOL/L (ref 7–16)
AST SERPL-CCNC: 29 U/L (ref 12–45)
BILIRUB SERPL-MCNC: 0.4 MG/DL (ref 0.1–1.5)
BUN SERPL-MCNC: 16 MG/DL (ref 8–22)
CALCIUM SERPL-MCNC: 9.4 MG/DL (ref 8.4–10.2)
CHLORIDE SERPL-SCNC: 100 MMOL/L (ref 96–112)
CO2 SERPL-SCNC: 22 MMOL/L (ref 20–33)
CREAT SERPL-MCNC: 1.08 MG/DL (ref 0.5–1.4)
EKG IMPRESSION: NORMAL
ERYTHROCYTE [DISTWIDTH] IN BLOOD BY AUTOMATED COUNT: 37 FL (ref 35.9–50)
EST. AVERAGE GLUCOSE BLD GHB EST-MCNC: 114 MG/DL
GLOBULIN SER CALC-MCNC: 2.8 G/DL (ref 1.9–3.5)
GLUCOSE SERPL-MCNC: 87 MG/DL (ref 65–99)
HBA1C MFR BLD: 5.6 % (ref 0–5.6)
HCT VFR BLD AUTO: 44.5 % (ref 42–52)
HGB BLD-MCNC: 15.4 G/DL (ref 14–18)
MCH RBC QN AUTO: 29.7 PG (ref 27–33)
MCHC RBC AUTO-ENTMCNC: 34.6 G/DL (ref 33.7–35.3)
MCV RBC AUTO: 85.7 FL (ref 81.4–97.8)
PLATELET # BLD AUTO: 284 K/UL (ref 164–446)
PMV BLD AUTO: 9.4 FL (ref 9–12.9)
POTASSIUM SERPL-SCNC: 3.6 MMOL/L (ref 3.6–5.5)
PROT SERPL-MCNC: 7.4 G/DL (ref 6–8.2)
RBC # BLD AUTO: 5.19 M/UL (ref 4.7–6.1)
SODIUM SERPL-SCNC: 137 MMOL/L (ref 135–145)
WBC # BLD AUTO: 7.7 K/UL (ref 4.8–10.8)

## 2021-02-16 PROCEDURE — 85027 COMPLETE CBC AUTOMATED: CPT

## 2021-02-16 PROCEDURE — 83036 HEMOGLOBIN GLYCOSYLATED A1C: CPT

## 2021-02-16 PROCEDURE — 36415 COLL VENOUS BLD VENIPUNCTURE: CPT

## 2021-02-16 PROCEDURE — C9803 HOPD COVID-19 SPEC COLLECT: HCPCS

## 2021-02-16 PROCEDURE — U0005 INFEC AGEN DETEC AMPLI PROBE: HCPCS

## 2021-02-16 PROCEDURE — 93010 ELECTROCARDIOGRAM REPORT: CPT | Performed by: INTERNAL MEDICINE

## 2021-02-16 PROCEDURE — 93005 ELECTROCARDIOGRAM TRACING: CPT

## 2021-02-16 PROCEDURE — U0003 INFECTIOUS AGENT DETECTION BY NUCLEIC ACID (DNA OR RNA); SEVERE ACUTE RESPIRATORY SYNDROME CORONAVIRUS 2 (SARS-COV-2) (CORONAVIRUS DISEASE [COVID-19]), AMPLIFIED PROBE TECHNIQUE, MAKING USE OF HIGH THROUGHPUT TECHNOLOGIES AS DESCRIBED BY CMS-2020-01-R: HCPCS

## 2021-02-16 PROCEDURE — 80053 COMPREHEN METABOLIC PANEL: CPT

## 2021-02-16 ASSESSMENT — FIBROSIS 4 INDEX: FIB4 SCORE: 0.63

## 2021-02-16 NOTE — PREPROCEDURE INSTRUCTIONS
Hx and meds reviewed, pre op instructions given, handouts reviewed, questions answered.  Pt instructed to continue regularly prescribed medications through day before surgery.Anesthesia fasting guidelines reviewed with pt. Covid testing scheduled and pt aware to self isolate after test.Placed on TOMER protocol, has history of it but does not have/use device.

## 2021-02-17 LAB
SARS-COV-2 RNA RESP QL NAA+PROBE: NOTDETECTED
SPECIMEN SOURCE: NORMAL

## 2021-02-22 ENCOUNTER — ANESTHESIA EVENT (OUTPATIENT)
Dept: SURGERY | Facility: MEDICAL CENTER | Age: 41
End: 2021-02-22
Payer: COMMERCIAL

## 2021-02-22 ENCOUNTER — ANESTHESIA (OUTPATIENT)
Dept: SURGERY | Facility: MEDICAL CENTER | Age: 41
End: 2021-02-22
Payer: COMMERCIAL

## 2021-02-22 ENCOUNTER — HOSPITAL ENCOUNTER (OUTPATIENT)
Facility: MEDICAL CENTER | Age: 41
End: 2021-02-22
Attending: SURGERY | Admitting: SURGERY
Payer: COMMERCIAL

## 2021-02-22 VITALS
HEART RATE: 86 BPM | SYSTOLIC BLOOD PRESSURE: 138 MMHG | BODY MASS INDEX: 32.67 KG/M2 | WEIGHT: 228.18 LBS | DIASTOLIC BLOOD PRESSURE: 94 MMHG | RESPIRATION RATE: 16 BRPM | HEIGHT: 70 IN | OXYGEN SATURATION: 95 % | TEMPERATURE: 96.8 F

## 2021-02-22 DIAGNOSIS — G89.18 ACUTE POSTOPERATIVE PAIN: ICD-10-CM

## 2021-02-22 DIAGNOSIS — K40.90 DIRECT LEFT INGUINAL HERNIA: ICD-10-CM

## 2021-02-22 DIAGNOSIS — K42.9 UMBILICAL HERNIA WITHOUT OBSTRUCTION AND WITHOUT GANGRENE: ICD-10-CM

## 2021-02-22 PROCEDURE — 160048 HCHG OR STATISTICAL LEVEL 1-5: Performed by: SURGERY

## 2021-02-22 PROCEDURE — 160035 HCHG PACU - 1ST 60 MINS PHASE I: Performed by: SURGERY

## 2021-02-22 PROCEDURE — 700101 HCHG RX REV CODE 250: Performed by: SURGERY

## 2021-02-22 PROCEDURE — 700105 HCHG RX REV CODE 258: Performed by: SURGERY

## 2021-02-22 PROCEDURE — 160042 HCHG SURGERY MINUTES - EA ADDL 1 MIN LEVEL 5: Performed by: SURGERY

## 2021-02-22 PROCEDURE — 700111 HCHG RX REV CODE 636 W/ 250 OVERRIDE (IP): Performed by: ANESTHESIOLOGY

## 2021-02-22 PROCEDURE — 160025 RECOVERY II MINUTES (STATS): Performed by: SURGERY

## 2021-02-22 PROCEDURE — 502714 HCHG ROBOTIC SURGERY SERVICES: Performed by: SURGERY

## 2021-02-22 PROCEDURE — 160046 HCHG PACU - 1ST 60 MINS PHASE II: Performed by: SURGERY

## 2021-02-22 PROCEDURE — 160009 HCHG ANES TIME/MIN: Performed by: SURGERY

## 2021-02-22 PROCEDURE — 160031 HCHG SURGERY MINUTES - 1ST 30 MINS LEVEL 5: Performed by: SURGERY

## 2021-02-22 PROCEDURE — C1781 MESH (IMPLANTABLE): HCPCS | Performed by: SURGERY

## 2021-02-22 PROCEDURE — 160036 HCHG PACU - EA ADDL 30 MINS PHASE I: Performed by: SURGERY

## 2021-02-22 PROCEDURE — 700111 HCHG RX REV CODE 636 W/ 250 OVERRIDE (IP): Performed by: SURGERY

## 2021-02-22 PROCEDURE — 501838 HCHG SUTURE GENERAL: Performed by: SURGERY

## 2021-02-22 PROCEDURE — 700105 HCHG RX REV CODE 258: Performed by: ANESTHESIOLOGY

## 2021-02-22 PROCEDURE — A9270 NON-COVERED ITEM OR SERVICE: HCPCS | Performed by: ANESTHESIOLOGY

## 2021-02-22 PROCEDURE — 500002 HCHG ADHESIVE, DERMABOND: Performed by: SURGERY

## 2021-02-22 PROCEDURE — 700101 HCHG RX REV CODE 250: Performed by: ANESTHESIOLOGY

## 2021-02-22 PROCEDURE — 700102 HCHG RX REV CODE 250 W/ 637 OVERRIDE(OP): Performed by: ANESTHESIOLOGY

## 2021-02-22 PROCEDURE — 500868 HCHG NEEDLE, SURGI(VARES): Performed by: SURGERY

## 2021-02-22 PROCEDURE — 160002 HCHG RECOVERY MINUTES (STAT): Performed by: SURGERY

## 2021-02-22 DEVICE — IMPLANTABLE DEVICE: Type: IMPLANTABLE DEVICE | Site: GROIN | Status: FUNCTIONAL

## 2021-02-22 DEVICE — MESH VENTRALEX ST W/STRAP - 4.3CM SMALL (1EA/CA): Type: IMPLANTABLE DEVICE | Site: ABDOMEN | Status: FUNCTIONAL

## 2021-02-22 RX ORDER — CELECOXIB 200 MG/1
400 CAPSULE ORAL ONCE
Status: COMPLETED | OUTPATIENT
Start: 2021-02-22 | End: 2021-02-22

## 2021-02-22 RX ORDER — HYDROMORPHONE HYDROCHLORIDE 1 MG/ML
0.4 INJECTION, SOLUTION INTRAMUSCULAR; INTRAVENOUS; SUBCUTANEOUS
Status: DISCONTINUED | OUTPATIENT
Start: 2021-02-22 | End: 2021-02-22 | Stop reason: HOSPADM

## 2021-02-22 RX ORDER — ACETAMINOPHEN 500 MG
1000 TABLET ORAL EVERY 6 HOURS
Qty: 56 TABLET | Refills: 0 | Status: SHIPPED | OUTPATIENT
Start: 2021-02-22 | End: 2021-03-01

## 2021-02-22 RX ORDER — OXYCODONE HCL 5 MG/5 ML
10 SOLUTION, ORAL ORAL
Status: COMPLETED | OUTPATIENT
Start: 2021-02-22 | End: 2021-02-22

## 2021-02-22 RX ORDER — HYDROMORPHONE HYDROCHLORIDE 1 MG/ML
0.1 INJECTION, SOLUTION INTRAMUSCULAR; INTRAVENOUS; SUBCUTANEOUS
Status: DISCONTINUED | OUTPATIENT
Start: 2021-02-22 | End: 2021-02-22 | Stop reason: HOSPADM

## 2021-02-22 RX ORDER — HALOPERIDOL 5 MG/ML
1 INJECTION INTRAMUSCULAR
Status: DISCONTINUED | OUTPATIENT
Start: 2021-02-22 | End: 2021-02-22 | Stop reason: HOSPADM

## 2021-02-22 RX ORDER — DIPHENHYDRAMINE HYDROCHLORIDE 50 MG/ML
12.5 INJECTION INTRAMUSCULAR; INTRAVENOUS
Status: DISCONTINUED | OUTPATIENT
Start: 2021-02-22 | End: 2021-02-22 | Stop reason: HOSPADM

## 2021-02-22 RX ORDER — DEXAMETHASONE SODIUM PHOSPHATE 4 MG/ML
INJECTION, SOLUTION INTRA-ARTICULAR; INTRALESIONAL; INTRAMUSCULAR; INTRAVENOUS; SOFT TISSUE PRN
Status: DISCONTINUED | OUTPATIENT
Start: 2021-02-22 | End: 2021-02-22 | Stop reason: SURG

## 2021-02-22 RX ORDER — CEFAZOLIN SODIUM 1 G/3ML
INJECTION, POWDER, FOR SOLUTION INTRAMUSCULAR; INTRAVENOUS PRN
Status: DISCONTINUED | OUTPATIENT
Start: 2021-02-22 | End: 2021-02-22 | Stop reason: SURG

## 2021-02-22 RX ORDER — IBUPROFEN 800 MG/1
800 TABLET ORAL
Qty: 21 TABLET | Refills: 0 | Status: SHIPPED | OUTPATIENT
Start: 2021-02-22 | End: 2021-03-01

## 2021-02-22 RX ORDER — LABETALOL HYDROCHLORIDE 5 MG/ML
INJECTION, SOLUTION INTRAVENOUS PRN
Status: DISCONTINUED | OUTPATIENT
Start: 2021-02-22 | End: 2021-02-22 | Stop reason: SURG

## 2021-02-22 RX ORDER — LIDOCAINE HYDROCHLORIDE 20 MG/ML
INJECTION, SOLUTION EPIDURAL; INFILTRATION; INTRACAUDAL; PERINEURAL PRN
Status: DISCONTINUED | OUTPATIENT
Start: 2021-02-22 | End: 2021-02-22 | Stop reason: SURG

## 2021-02-22 RX ORDER — HYDROMORPHONE HYDROCHLORIDE 2 MG/ML
INJECTION, SOLUTION INTRAMUSCULAR; INTRAVENOUS; SUBCUTANEOUS PRN
Status: DISCONTINUED | OUTPATIENT
Start: 2021-02-22 | End: 2021-02-22 | Stop reason: SURG

## 2021-02-22 RX ORDER — BUPIVACAINE HYDROCHLORIDE AND EPINEPHRINE 5; 5 MG/ML; UG/ML
INJECTION, SOLUTION EPIDURAL; INTRACAUDAL; PERINEURAL
Status: DISCONTINUED | OUTPATIENT
Start: 2021-02-22 | End: 2021-02-22 | Stop reason: HOSPADM

## 2021-02-22 RX ORDER — ONDANSETRON 2 MG/ML
4 INJECTION INTRAMUSCULAR; INTRAVENOUS
Status: DISCONTINUED | OUTPATIENT
Start: 2021-02-22 | End: 2021-02-22 | Stop reason: HOSPADM

## 2021-02-22 RX ORDER — OXYCODONE HCL 5 MG/5 ML
5 SOLUTION, ORAL ORAL
Status: COMPLETED | OUTPATIENT
Start: 2021-02-22 | End: 2021-02-22

## 2021-02-22 RX ORDER — ACETAMINOPHEN 500 MG
1000 TABLET ORAL ONCE
Status: COMPLETED | OUTPATIENT
Start: 2021-02-22 | End: 2021-02-22

## 2021-02-22 RX ORDER — ROCURONIUM BROMIDE 10 MG/ML
INJECTION, SOLUTION INTRAVENOUS PRN
Status: DISCONTINUED | OUTPATIENT
Start: 2021-02-22 | End: 2021-02-22 | Stop reason: SURG

## 2021-02-22 RX ORDER — SODIUM CHLORIDE, SODIUM LACTATE, POTASSIUM CHLORIDE, CALCIUM CHLORIDE 600; 310; 30; 20 MG/100ML; MG/100ML; MG/100ML; MG/100ML
INJECTION, SOLUTION INTRAVENOUS CONTINUOUS
Status: DISCONTINUED | OUTPATIENT
Start: 2021-02-22 | End: 2021-02-22 | Stop reason: HOSPADM

## 2021-02-22 RX ORDER — HYDROMORPHONE HYDROCHLORIDE 1 MG/ML
0.2 INJECTION, SOLUTION INTRAMUSCULAR; INTRAVENOUS; SUBCUTANEOUS
Status: DISCONTINUED | OUTPATIENT
Start: 2021-02-22 | End: 2021-02-22 | Stop reason: HOSPADM

## 2021-02-22 RX ORDER — ONDANSETRON 2 MG/ML
INJECTION INTRAMUSCULAR; INTRAVENOUS PRN
Status: DISCONTINUED | OUTPATIENT
Start: 2021-02-22 | End: 2021-02-22 | Stop reason: SURG

## 2021-02-22 RX ORDER — SUCCINYLCHOLINE/SOD CL,ISO/PF 200MG/10ML
SYRINGE (ML) INTRAVENOUS PRN
Status: DISCONTINUED | OUTPATIENT
Start: 2021-02-22 | End: 2021-02-22 | Stop reason: SURG

## 2021-02-22 RX ORDER — SODIUM CHLORIDE, SODIUM LACTATE, POTASSIUM CHLORIDE, CALCIUM CHLORIDE 600; 310; 30; 20 MG/100ML; MG/100ML; MG/100ML; MG/100ML
INJECTION, SOLUTION INTRAVENOUS
Status: DISCONTINUED | OUTPATIENT
Start: 2021-02-22 | End: 2021-02-22 | Stop reason: SURG

## 2021-02-22 RX ORDER — OXYCODONE HYDROCHLORIDE 5 MG/1
5 TABLET ORAL EVERY 6 HOURS PRN
Qty: 12 TABLET | Refills: 0 | Status: SHIPPED | OUTPATIENT
Start: 2021-02-22 | End: 2021-02-25

## 2021-02-22 RX ORDER — LABETALOL HYDROCHLORIDE 5 MG/ML
5 INJECTION, SOLUTION INTRAVENOUS
Status: DISCONTINUED | OUTPATIENT
Start: 2021-02-22 | End: 2021-02-22 | Stop reason: HOSPADM

## 2021-02-22 RX ADMIN — HYDROMORPHONE HYDROCHLORIDE 0.6 MG: 2 INJECTION, SOLUTION INTRAMUSCULAR; INTRAVENOUS; SUBCUTANEOUS at 08:00

## 2021-02-22 RX ADMIN — DEXAMETHASONE SODIUM PHOSPHATE 8 MG: 4 INJECTION, SOLUTION INTRAMUSCULAR; INTRAVENOUS at 07:49

## 2021-02-22 RX ADMIN — ACETAMINOPHEN 1000 MG: 500 TABLET ORAL at 07:11

## 2021-02-22 RX ADMIN — WATER 15 ML: 100 IRRIGANT IRRIGATION at 06:54

## 2021-02-22 RX ADMIN — ROCURONIUM BROMIDE 10 MG: 10 INJECTION, SOLUTION INTRAVENOUS at 08:16

## 2021-02-22 RX ADMIN — LABETALOL HYDROCHLORIDE 10 MG: 5 INJECTION, SOLUTION INTRAVENOUS at 07:54

## 2021-02-22 RX ADMIN — SODIUM CHLORIDE, POTASSIUM CHLORIDE, SODIUM LACTATE AND CALCIUM CHLORIDE: 600; 310; 30; 20 INJECTION, SOLUTION INTRAVENOUS at 07:39

## 2021-02-22 RX ADMIN — ROCURONIUM BROMIDE 40 MG: 10 INJECTION, SOLUTION INTRAVENOUS at 07:43

## 2021-02-22 RX ADMIN — METHOCARBAMOL 1000 MG: 100 INJECTION INTRAMUSCULAR; INTRAVENOUS at 09:20

## 2021-02-22 RX ADMIN — SODIUM CHLORIDE, POTASSIUM CHLORIDE, SODIUM LACTATE AND CALCIUM CHLORIDE: 600; 310; 30; 20 INJECTION, SOLUTION INTRAVENOUS at 06:54

## 2021-02-22 RX ADMIN — FENTANYL CITRATE 100 MCG: 50 INJECTION, SOLUTION INTRAMUSCULAR; INTRAVENOUS at 07:52

## 2021-02-22 RX ADMIN — LIDOCAINE HYDROCHLORIDE 0.5 ML: 10 INJECTION, SOLUTION INFILTRATION; PERINEURAL at 06:54

## 2021-02-22 RX ADMIN — HYDROMORPHONE HYDROCHLORIDE 0.4 MG: 2 INJECTION, SOLUTION INTRAMUSCULAR; INTRAVENOUS; SUBCUTANEOUS at 08:16

## 2021-02-22 RX ADMIN — CEFAZOLIN 2 G: 1 INJECTION, POWDER, FOR SOLUTION INTRAVENOUS at 07:47

## 2021-02-22 RX ADMIN — OXYCODONE HYDROCHLORIDE 10 MG: 5 SOLUTION ORAL at 09:09

## 2021-02-22 RX ADMIN — FENTANYL CITRATE 25 MCG: 50 INJECTION, SOLUTION INTRAMUSCULAR; INTRAVENOUS at 09:26

## 2021-02-22 RX ADMIN — PROPOFOL 200 MG: 10 INJECTION, EMULSION INTRAVENOUS at 07:43

## 2021-02-22 RX ADMIN — FENTANYL CITRATE 25 MCG: 50 INJECTION, SOLUTION INTRAMUSCULAR; INTRAVENOUS at 09:15

## 2021-02-22 RX ADMIN — ONDANSETRON 4 MG: 2 INJECTION INTRAMUSCULAR; INTRAVENOUS at 08:25

## 2021-02-22 RX ADMIN — FENTANYL CITRATE 50 MCG: 50 INJECTION, SOLUTION INTRAMUSCULAR; INTRAVENOUS at 09:22

## 2021-02-22 RX ADMIN — Medication 140 MG: at 07:43

## 2021-02-22 RX ADMIN — LIDOCAINE HYDROCHLORIDE 100 MG: 20 INJECTION, SOLUTION EPIDURAL; INFILTRATION; INTRACAUDAL; PERINEURAL at 07:43

## 2021-02-22 RX ADMIN — ROCURONIUM BROMIDE 10 MG: 10 INJECTION, SOLUTION INTRAVENOUS at 07:59

## 2021-02-22 RX ADMIN — CELECOXIB 400 MG: 200 CAPSULE ORAL at 07:11

## 2021-02-22 RX ADMIN — ROCURONIUM BROMIDE 10 MG: 10 INJECTION, SOLUTION INTRAVENOUS at 07:38

## 2021-02-22 RX ADMIN — SUGAMMADEX 200 MG: 100 INJECTION, SOLUTION INTRAVENOUS at 08:36

## 2021-02-22 RX ADMIN — FENTANYL CITRATE 100 MCG: 50 INJECTION, SOLUTION INTRAMUSCULAR; INTRAVENOUS at 07:43

## 2021-02-22 ASSESSMENT — FIBROSIS 4 INDEX: FIB4 SCORE: 0.73

## 2021-02-22 ASSESSMENT — PAIN DESCRIPTION - PAIN TYPE
TYPE: ACUTE PAIN;SURGICAL PAIN
TYPE: ACUTE PAIN;SURGICAL PAIN

## 2021-02-22 ASSESSMENT — PAIN SCALES - GENERAL: PAIN_LEVEL: 5

## 2021-02-22 NOTE — OR NURSING
1002: Patient arrives in phase II with 5/10 pain that is tolerable Post Hernias repair by Dr. Latham.    1010: Care to MICHAEL Calderon

## 2021-02-22 NOTE — ANESTHESIA POSTPROCEDURE EVALUATION
Patient: Braden Hill    Procedure Summary     Date: 02/22/21 Room / Location:  OR  / SURGERY [facility]    Anesthesia Start: 0739 Anesthesia Stop: 0847    Procedures:       REPAIR, HERNIA, INGUINAL, ROBOT-ASSISTED, USING DA GONZALO XI (Right Groin)      REPAIR,HERNIA,UMBILICAL,ROBOT-ASSISTED,USING DA GONZALO XI (Abdomen) Diagnosis: (BILATERAL INGUINAL HERNIA, UMBILICAL HERNIA WITHOUT OBSTRUCTION)    Surgeons: Ian Latham M.D. Responsible Provider: Colin Sims M.D.    Anesthesia Type: general ASA Status: 2          Final Anesthesia Type: general  Last vitals  BP   Blood Pressure: 138/94    Temp   36 °C (96.8 °F)    Pulse   86   Resp   16    SpO2   95 %      Anesthesia Post Evaluation    Patient location during evaluation: PACU  Patient participation: complete - patient participated  Level of consciousness: awake and alert  Pain score: 5    Airway patency: patent  Anesthetic complications: no  Cardiovascular status: hemodynamically stable  Respiratory status: acceptable  Hydration status: euvolemic    PONV: none          No complications documented.     Nurse Pain Score: 5 (NPRS)

## 2021-02-22 NOTE — OP REPORT
Operative Report    Date: 2/22/2021    PreOp Diagnosis:   1.  Bilateral inguinal hernia  2.  Umbilical hernia    PostOp Diagnosis:   1.  Left inguinal hernia  2.  Umbilical hernia    Procedure(s):  1.  REPAIR, HERNIA, INGUINAL, ROBOT-ASSISTED, USING DA GONZALO XI  (12 x 16 cm progrip mesh)  2.  Open umbilical hernia repair with mesh (4 cm Ventralex)   - Wound Class: Clean    Surgeon(s):  Ian Latham M.D.    First Assist: MONICA Jimenez   (surgical assistant was required for the entire procedure for management of bedside robotic surgery needs including retraction, switching instruments, placement of mesh.)    Anesthesiologist/Type of Anesthesia:  Anesthesiologist: Colin Sims M.D./General    Surgical Staff:  Circulator: Heather C Cogan, R.N.  Scrub Person: George Bermeo      Specimens removed if any:  * No specimens in log *    Estimated Blood Loss: 5 mL    Findings: Moderate sized left inguinal hernia and small umbilical hernia.  There was no visible right inguinal hernia.  The right inguinal hernia was seen on preoperative ultrasound but the patient had no symptoms of right inguinal hernia.    Complications: None noted    Outcome: Transferred to PACU in stable condition    Indications:  40-year-old male with a symptomatic left and umbilical hernia for which the above procedures were recommended.  The risk, benefits, and alternatives as well as the operation was discussed with him in detail and he wished to proceed.    Procedure in detail:   The patient was brought to the operating room and was placed in the supine position where general endotracheal anesthesia was induced. SCDs were in place and functioning.  Preoperative antibiotics of ancef were given before incision time. The patient's abdomen was prepped and draped in the usual sterile fashion.    After infiltration of local anesthetic, a skin incision was made to accommodate an 8 mm robotic port in midline at the level of the umbilicus.   A Veress needle was used to access the peritoneum, and the abdomen was insufflated to 15 mmHg, which the patient tolerated well.  The laparoscopic camera was then introduced and the abdomen inspected for evidence of trauma secondary to Veress needle or port placement, and found none.  The peritoneal space was examined, and there were no adhesions.  Two more 8 mm ports were placed, one in the right upper quadrant one in the left upper quadrant.      The patient was then placed in approximately 12 degrees of Trendelenburg, and robot brought in and docked.  The robotic endoscope was introduced and targeting completed, then the remaining robotic arms were then attached, and the working instruments were brought in under direct vision.    The pelvis was visualized and inspected.  There was a moderate sized left inguinal hernia but no visible right inguinal hernia noted.  A transverse peritoneal flap was then created several centimeters above the visible left lower quadrant hernia, starting at the left medial umbilical fold extending to the left lateral abdominal wall.  Care was taken to ensure that the peritoneal flap was dissected down low enough for the placement of a 12 x 16 cm Progrip mesh.  The dissection was carried medial to expose the pubic bone, Geoff's ligament medially.  The cord structures and cord vessels were then  from the hernia sac using careful combination of blunt and electrocautery dissection.  The peritoneal sac was carefully removed from the hernia defect.  Once the appropriate dissection size was created, the appropriate mesh was inserted.  Care was taken to ensure that it was flat across the pelvic floor, covering the hernia defect, and the posterior edge was not curled up, but was behind the inferior peritoneal edge.  Hemostasis was ensured.  The peritoneum was then closed using 2-0 stratafix suture in a running fashion.  Care was taken to close all the peritoneal defects.  All trochars  were then removed under direct vision and hemostasis insured.    The supraumbilical curvelinear incision was extended slightly with the knife, and opened subcutaneous tissues with cautery until the hernia defect was encountered measuring approximately 1 cm. The defect was dissected out circumferentially, and a small hernia sac as well as incarcerated preperitoneal fat was excised and discarded. A 4 cm ventralex mesh was sewed to repair the defect in an underlay fashion with 0 ethibond.     All 3 incisions were then closed with 4-0 Vicryl suture in an interrupted fashion.  Dermabond was applied.    All sponge, needle, and instrument counts were reported as correct at the end of the procedure. The patient tolerated the procedure well and left the operating room for the recovery room in stable and satisfactory condition.    Ian Latham M.D.  [facility]  [phone]      2/22/2021 8:39 AM Ian Latham M.D.
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
## 2021-02-22 NOTE — ANESTHESIA PROCEDURE NOTES
Airway    Date/Time: 2/22/2021 7:43 AM  Performed by: Colin Sims M.D.  Authorized by: Colin Sims M.D.     Location:  OR  Urgency:  Elective  Indications for Airway Management:  Anesthesia      Spontaneous Ventilation: absent    Sedation Level:  Deep  Preoxygenated: Yes    Patient Position:  Sniffing  Mask Difficulty Assessment:  0 - not attempted  Final Airway Type:  Endotracheal airway  Final Endotracheal Airway:  ETT  Cuffed: Yes    Technique Used for Successful ETT Placement:  Direct laryngoscopy    Insertion Site:  Oral  Blade Type:  Alex  Laryngoscope Blade/Videolaryngoscope Blade Size:  3  ETT Size (mm):  8.0  Measured from:  Teeth  ETT to Teeth (cm):  23  Placement Verified by: auscultation and capnometry    Cormack-Lehane Classification:  Grade I - full view of glottis  Number of Attempts at Approach:  1

## 2021-02-22 NOTE — DISCHARGE INSTRUCTIONS
ACTIVITY: Rest and take it easy for the first 24 hours.  A responsible adult is recommended to remain with you during that time.  It is normal to feel sleepy.  We encourage you to not do anything that requires balance, judgment or coordination.    MILD FLU-LIKE SYMPTOMS ARE NORMAL. YOU MAY EXPERIENCE GENERALIZED MUSCLE ACHES, THROAT IRRITATION, HEADACHE AND/OR SOME NAUSEA.    FOR 24 HOURS DO NOT:  Drive, operate machinery or run household appliances.  Drink beer or alcoholic beverages.   Make important decisions or sign legal documents.    SPECIAL INSTRUCTIONS:     DIET: To avoid nausea, slowly advance diet as tolerated, avoiding spicy or greasy foods for the first day.  Add more substantial food to your diet according to your physician's instructions.  Babies can be fed formula or breast milk as soon as they are hungry.  INCREASE FLUIDS AND FIBER TO AVOID CONSTIPATION.    SURGICAL DRESSING/BATHING: Keep dressing clean, dry and intact until instructed otherwise by surgeon, Do Not Submerge in Bath Tub or Jacuuzi etc for at least 3 weeks. No lifting anything heavier than 10 pounds until released by physician.    FOLLOW-UP APPOINTMENT:  A follow-up appointment should be arranged with your doctor, call to schedule.    You should CALL YOUR PHYSICIAN if you develop:  Fever greater than 101 degrees F.  Pain not relieved by medication, or persistent nausea or vomiting.  Excessive bleeding (blood soaking through dressing) or unexpected drainage from the wound.  Extreme redness or swelling around the incision site, drainage of pus or foul smelling drainage.  Inability to urinate or empty your bladder within 8 hours.  Problems with breathing or chest pain.    You should call 911 if you develop problems with breathing or chest pain.  If you are unable to contact your doctor or surgical center, you should go to the nearest emergency room or urgent care center.  Physician's telephone #: [phone]    If any questions arise,  call your doctor.  If your doctor is not available, please feel free to call the Surgical Center at [phone]. The Contact Center is open Monday through Friday 7AM to 5PM and may speak to a nurse at [phone], or toll free at [phone].     A registered nurse may call you a few days after your surgery to see how you are doing after your procedure.    MEDICATIONS: Resume taking daily medication.  Take prescribed pain medication with food.  If no medication is prescribed, you may take non-aspirin pain medication if needed.  PAIN MEDICATION CAN BE VERY CONSTIPATING.  Take a stool softener or laxative such as senokot, pericolace, or milk of magnesia if needed.    Prescription given for Called into [facility].  Last pain medication given at 0925.    If your physician has prescribed pain medication that includes Acetaminophen (Tylenol), do not take additional Acetaminophen (Tylenol) while taking the prescribed medication.    Depression / Suicide Risk    As you are discharged from this [facility] facility, it is important to learn how to keep safe from harming yourself.    Recognize the warning signs:  · Abrupt changes in personality, positive or negative- including increase in energy   · Giving away possessions  · Change in eating patterns- significant weight changes-  positive or negative  · Change in sleeping patterns- unable to sleep or sleeping all the time   · Unwillingness or inability to communicate  · Depression  · Unusual sadness, discouragement and loneliness  · Talk of wanting to die  · Neglect of personal appearance   · Rebelliousness- reckless behavior  · Withdrawal from people/activities they love  · Confusion- inability to concentrate     If you or a loved one observes any of these behaviors or has concerns about self-harm, here's what you can do:  · Talk about it- your feelings and reasons for harming yourself  · Remove any means that you might use to hurt yourself  (examples: pills, rope, extension cords, firearm)  · Get professional help from the community (Mental Health, Substance Abuse, psychological counseling)  · Do not be alone:Call your Safe Contact- someone whom you trust who will be there for you.  · Call your local CRISIS HOTLINE 382-2711 or 013-504-4525  · Call your local Children's Mobile Crisis Response Team [place] (861) 329-7112 or www.[employer]  · Call the toll free National Suicide Prevention Hotlines   · National Suicide Prevention Lifeline 602-270-OPFP (9951)  · National Hope Line Network 800-SUICIDE (235-6563)

## 2021-02-22 NOTE — ANESTHESIA PREPROCEDURE EVALUATION
Relevant Problems   ANESTHESIA   (+) TOMER (obstructive sleep apnea)      GI   (+) Gastroesophageal reflux disease       Physical Exam    Airway   Mallampati: II  TM distance: >3 FB  Neck ROM: full       Cardiovascular - normal exam  Rhythm: regular  Rate: normal  (-) murmur     Dental - normal exam           Pulmonary - normal exam  Breath sounds clear to auscultation     Abdominal    Neurological - normal exam                 Anesthesia Plan    ASA 2       Plan - general       Airway plan will be ETT          Induction: intravenous    Postoperative Plan: Postoperative administration of opioids is intended.    Pertinent diagnostic labs and testing reviewed    Informed Consent:    Anesthetic plan and risks discussed with patient.    Use of blood products discussed with: patient whom consented to blood products.

## 2021-02-22 NOTE — ANESTHESIA TIME REPORT
Anesthesia Start and Stop Event Times     Date Time Event    2/22/2021 0709 Ready for Procedure     0739 Anesthesia Start     0847 Anesthesia Stop        Responsible Staff  02/22/21    Name Role Begin End    Colin Sims M.D. Anesth 0739 0847        Preop Diagnosis (Free Text):  Pre-op Diagnosis     BILATERAL INGUINAL HERNIA, UMBILICAL HERNIA WITHOUT OBSTRUCTION        Preop Diagnosis (Codes):    Post op Diagnosis  Bilateral inguinal hernia      Premium Reason  Non-Premium    Comments:

## 2021-02-22 NOTE — OR NURSING
0945: assumed Patient care from MICHAEL Caballero for her break, Patient rates pain at 5/10 and tolerable when not moving. Patient is resting and appears to be comfortable. STOPBANg should be complete at 0950.    1000: MICHAEL Caballero re assumes care of the Patient.

## 2021-02-22 NOTE — OR NURSING
1010 assumed care of pt, sitting up in chair dressed, taking po fluids, states pain at a  Tolerable level,  Assessment completed, umbilical incision w/small amt red drainage-reinforced with 2x2 and tegaderm    1045 discharge instructions given to pt and wife-verbalize understanding    1055 iv dc'd, pt discharged home in stable condition, to car via w/c with cna

## 2021-05-26 ENCOUNTER — OFFICE VISIT (OUTPATIENT)
Dept: MEDICAL GROUP | Facility: PHYSICIAN GROUP | Age: 41
End: 2021-05-26
Payer: COMMERCIAL

## 2021-05-26 ENCOUNTER — NURSE TRIAGE (OUTPATIENT)
Dept: HEALTH INFORMATION MANAGEMENT | Facility: OTHER | Age: 41
End: 2021-05-26

## 2021-05-26 VITALS
TEMPERATURE: 98.7 F | SYSTOLIC BLOOD PRESSURE: 136 MMHG | WEIGHT: 220 LBS | BODY MASS INDEX: 31.5 KG/M2 | DIASTOLIC BLOOD PRESSURE: 78 MMHG | HEIGHT: 70 IN | HEART RATE: 87 BPM | OXYGEN SATURATION: 97 % | RESPIRATION RATE: 14 BRPM

## 2021-05-26 DIAGNOSIS — G47.19 EXCESSIVE DAYTIME SLEEPINESS: ICD-10-CM

## 2021-05-26 DIAGNOSIS — R06.83 SNORING: ICD-10-CM

## 2021-05-26 DIAGNOSIS — R09.81 CHRONIC NASAL CONGESTION: ICD-10-CM

## 2021-05-26 DIAGNOSIS — J30.2 SEASONAL ALLERGIES: ICD-10-CM

## 2021-05-26 DIAGNOSIS — G47.33 OSA (OBSTRUCTIVE SLEEP APNEA): ICD-10-CM

## 2021-05-26 PROCEDURE — 99214 OFFICE O/P EST MOD 30 MIN: CPT | Performed by: NURSE PRACTITIONER

## 2021-05-26 ASSESSMENT — FIBROSIS 4 INDEX: FIB4 SCORE: 0.73

## 2021-05-26 NOTE — PATIENT INSTRUCTIONS
Consider changing to Allegra D or Zyrtec D along with flonase 1 spray to each nostril twice a day    Generous use of nasal saline, 2 sprays to each nostril 3-4 times a day ([place] or [place])    Have your labs done soon    Sleep study ordered     Referral to ENT  SLEEP STUDY INSTRUCTIONS    1. Our main concern is to provide the best test and evaluation of your sleep and your cooperation in following the guidelines is very necessary.    2. We have no facilities for family members or guests at the sleep center. Special arrangements will be made for children requiring overnight sleep studies.    3. Unless otherwise instructed, AVOID alcoholic beverages on the day of your sleep study.    4. DO NOT drink coffee or caffeine-containing beverages after 12:00 noon on the day of your sleep study.    5. There is NO smoking at the sleep center.    6. Try to maintain a usual daytime schedule prior to the study (avoid unusual physical activity or meals).    7. DO NOT take a nap on the day of your study.    8. This is an outpatient procedure (test); therefore, nursing services, medications, and meals ARE NOT provided. If you take medications, bring them with you and take them on the schedule you do at home.    9. Please fill your sleep aid prescription (Ambien or Lunesta) and bring to your sleep study. Even patients who normally have no problem going to sleep often need a sleep aid in this different environment.    10. We ask that you wear conventional sleep attire (pajamas or sweats) for the sleep study. We discourage patients from wearing only their underwear to bed. We recommend two-piece pajamas as the techs will need to apply sensors to your stomach.    11. Please shampoo your hair the day of the sleep study. Please DO NOT use any other hair or skin products before your arrival (e.g., mousse, gel, hair or body spray, perfume, body lotion etc.) NOTE: Women should not wear heavy makeup prior to arrival as some wires are taped to the  face area.    12. The technician will be applying several small electrodes to the scalp, eye area, chin, chest, and legs, plus respiratory effort belts around the chest. Also, there will be a device placed directly under the nose. (THIS WILL NOT OBSTRUCT YOUR BREATHING.) This is a painless procedure and the skin is not broken.    13. The test is generally completed in six to eight hours; We are usually done between 6 - 7 a.m., unless you are scheduled for a nap study. You may need to come back another night for a second study to complete your treatment plan.    14. Patients who are scheduled for an MSLT (nap study) will stay at the sleep center for the day following their nighttime study. You will be notified if a nap study was ordered for you at the time the night study is scheduled. Generally, patients having a nap study will leave the sleep center by 4 p.m.    15. You will need to bring food for the following day if you are scheduled for a nap study. A refrigerator and microwave are available.    16. A bathroom is available for your use.    17. We are able to adjust the room temperature for your comfort. Please let the technologist know if you are uncomfortable during the study.    18. If you sleep better with a special pillow or stuffed animal, you may bring it along. Service animals are the only live animals permitted.    19. Cable T.V. is available.    20. You will be scheduled for a follow-up appointment three to five days after the sleep study to review your results.    21. A copy of your sleep study is sent to the referring physician approximately two weeks after your study.    22. Any questions can be directed to our staff at [phone].    23. If CPAP therapy is applied, a home unit will be ordered for you through the home medical equipment company. You will be contacted to schedule delivery after insurance authorization.

## 2021-05-26 NOTE — PROGRESS NOTES
Chief Complaint   Patient presents with   • Sinus Problem   • Requesting Labs       HISTORY OF PRESENT ILLNESS: Patient is a 40 y.o. male established patient who presents today to discuss chronic nasal congestion, requesting labs, referral    TOMER (obstructive sleep apnea)  Patient has been diagnosed in the past with TOMER, diagnosed clinically based on symptoms, in the past he was referred to sleep study but never did have this done  He is having significant daytime sleepiness and has excessive snoring at night  He is ready to follow through with sleep study, referral has been placed    Seasonal allergies  Patient has seasonal allergies as well as chronic nasal congestion  He has tried many over-the-counter medications including second-generation antihistamines, nasal saline and but finds that he is consistently using Afrin  We did discuss the risks associated with chronic use of Afrin  Upon physical examination it does appear that his symptom is mildly deviated  It was recommended that he use second-generation antihistamine, Flonase and generous use of nasal saline in the interim until he can get in with ENT for further evaluation of his chronic nasal congestion      Patient Active Problem List    Diagnosis Date Noted   • Chronic nasal congestion 06/01/2021   • Seasonal allergies 05/26/2021   • Non-recurrent bilateral inguinal hernia without obstruction or gangrene 01/19/2021   • Insomnia 12/16/2019   • Irregular heart beat 12/12/2019   • Major depressive disorder 12/12/2019   • History of tobacco abuse 11/26/2018   • Elevated blood pressure reading 08/08/2018   • Disordered sleep 08/08/2018   • TOMER (obstructive sleep apnea) 08/08/2018   • Gastroesophageal reflux disease 08/08/2018   • Obesity (BMI 30-39.9) 08/08/2018   • Elevated blood sugar 03/31/2017   • Vitamin D deficiency 09/22/2015   • Low testosterone 09/15/2015   • LFTs abnormal 09/15/2015   • BMI 34.0-34.9,adult 09/10/2015   • Fatigue 09/10/2015   •  Palpitations 09/10/2015   • Hypertriglyceridemia 2013       Allergies:Patient has no known allergies.    Current Outpatient Medications   Medication Sig Dispense Refill   • fenofibrate (TRICOR) 145 MG Tab Take 1 Tab by mouth every day. 90 Tab 0     No current facility-administered medications for this visit.       Social History     Tobacco Use   • Smoking status: Former Smoker     Packs/day: 0.50     Years: 20.00     Pack years: 10.00     Types: Cigars, Cigarettes     Start date: 2018   • Smokeless tobacco: Former User     Quit date: 2017   • Tobacco comment: 1 can qod as of  uses nicotine pouches   Vaping Use   • Vaping Use: Former   Substance Use Topics   • Alcohol use: Yes     Comment: 6 pack per week   • Drug use: Yes     Frequency: 7.0 times per week     Types: Marijuana, Inhaled     Comment: vape marijuana- denies h/o heroin, cocaine; h/o meth x 4, smoke, quit around .       Family Status   Relation Name Status   • Mo  Alive   • Fa  Alive   • MGMo     • MGFa     • PGMo     • PGFa     • PAunt  (Not Specified)   • Neg Hx  (Not Specified)     Family History   Problem Relation Age of Onset   • Hypertension Father    • Cancer Paternal Aunt         lung   • Heart Disease Neg Hx    • Diabetes Neg Hx    • Stroke Neg Hx    • Hyperlipidemia Neg Hx        Review of Systems:   Constitutional: Negative for fever, chills, weight loss and malaise/fatigue.   HENT: Positive per HPI  Respiratory: Negative for cough, sputum production, shortness of breath and wheezing.    Cardiovascular: Negative for chest pain, palpitations, orthopnea and leg swelling.   Gastrointestinal: Negative for heartburn, nausea, vomiting and abdominal pain.   Genitourinary/Renal: Negative for dysuria, urgency and frequency.   Musculoskeletal: Negative for myalgias, back pain and joint pain.   Skin: Negative for rash and itching.   Neurological: Negative for dizziness, tingling, tremors, sensory  "change, focal weakness and headaches.   Endo/Heme/Allergies: Does not bruise/bleed easily.     Exam:  /78   Pulse 87   Temp 37.1 °C (98.7 °F) (Temporal)   Resp 14   Ht 1.778 m (5' 10\")   Wt 99.8 kg (220 lb)   SpO2 97%   General:  Well nourished, well developed male in NAD  Skin: warm, dry, intact, no evidence of rash or concerning lesions  Head: is grossly normal.  HEENT: eyes clear, conjunctiva normal, PERRLA.  Positive for mild deviation of nasal septum to the right, narrow airway in right nare  Pulmonary: Clear to ausculation. Normal effort. No rales, ronchi, or wheezing.  Cardiovascular: Regular rate and rhythm without murmur. Carotid and radial pulses are intact and equal bilaterally.  Abdomen: soft, non-tender, positive bowel sounds  Musculoskeletal: no clubbing, cyanosis, or edema.  Psych/mental: no depression, anxiety, hallucinations  Neuro: alert, intact, CN 2-12 grossly intact    Medical decision-making and discussion:    As mentioned above discussed the importance of seasonal allergy regimen to include antihistamines, nasal steroids and generous use of nasal saline.  However in the setting of his possible deviated septum will refer to ENT for further evaluation.  He was reminded have labs done as previously ordered.  The sleep study has been ordered as well.        Assessment/Plan:  Braden was seen today for sinus problem and requesting labs.    Diagnoses and all orders for this visit:    Seasonal allergies    TOMER (obstructive sleep apnea)  -     Polysomnography, 4 or More; Future    Chronic nasal congestion  -     REFERRAL TO ENT    Excessive daytime sleepiness  -     Cancel: Polysomnography, 4 or More; Future  -     REFERRAL TO PULMONARY AND SLEEP MEDICINE    Snoring  -     Cancel: Polysomnography, 4 or More; Future  -     REFERRAL TO PULMONARY AND SLEEP MEDICINE         Return if symptoms worsen or fail to improve, for Follow-up.    Please note that this dictation was created using voice " recognition software. I have made every reasonable attempt to correct obvious errors, but I expect that there are errors of grammar and possibly content that I did not discover before finalizing the note.

## 2021-05-26 NOTE — TELEPHONE ENCOUNTER
Regarding: SINUS ISSUES / CONGESTION / ISSUES BREATHING THRU NOSE PT WANTS APPT WITH PCP  ----- Message from Tacho May sent at 5/26/2021  9:21 AM PDT -----  SINUS ISSUES / CONGESTION / ISSUES BREATHING THRU NOSE PT WANTS APPT WITH PCP

## 2021-05-26 NOTE — TELEPHONE ENCOUNTER
Pt wife is reporting pt is sinus issues for years with difficulty breathing through the nose.    RIGHT nostril is clogged non stop for years.  Pt has been taking Claritin and nasal spray.  Pt reporting allergy.    1. Caller Name: Braden Hill  2.                  Call Back Number:[phone] (home)   3.   [facility] PCP or Specialty Provider: Yes MONICA Denton          2. Has the patient previously tested positive for COVID-19? No    3.  In the last two weeks, has the patient had any new or worsening symptoms (not explained by alternative diagnosis)? No.    4.  Does patient have any comoribidities? None     5.  Has the patient had any known contact with someone who is suspected or confirmed to have COVID-19? No.    5. Disposition: Cleared by RN Triage as potential is low for COVID-19; OK to keep/schedule appointment    Note routed to [facility] Provider: MARTI only.     Reason for Disposition  • [1] Sinus congestion (pressure, fullness) AND [2] present > 10 days    Additional Information  • Negative: Severe difficulty breathing (e.g., struggling for each breath, speaks in single words)  • Negative: Sounds like a life-threatening emergency to the triager  • Negative: [1] Sinus infection AND [2] taking an antibiotic AND [3] symptoms continue  • Negative: [1] Difficulty breathing AND [2] not from stuffy nose (e.g., not relieved by cleaning out the nose)  • Negative: [1] SEVERE headache AND [2] fever  • Negative: [1] Redness or swelling on the cheek, forehead or around the eye AND [2] fever  • Negative: Fever > 104 F (40 C)  • Negative: Patient sounds very sick or weak to the triager  • Negative: [1] SEVERE pain AND [2] not improved 2 hours after pain medicine  • Negative: [1] Redness or swelling on the cheek, forehead or around the eye AND [2] no fever  • Negative: [1] Fever > 101 F (38.3 C) AND [2] age > 60  • Negative: [1] Fever > 100.0 F (37.8 C) AND [2] bedridden (e.g., nursing home patient, CVA, chronic  "illness, recovering from surgery)  • Negative: [1] Fever > 100.0 F (37.8 C) AND [2] diabetes mellitus or weak immune system (e.g., HIV positive, cancer chemo, splenectomy, organ transplant, chronic steroids)  • Negative: Fever present > 3 days (72 hours)  • Negative: [1] Fever returns after gone for over 24 hours AND [2] symptoms worse or not improved  • Negative: [1] Sinus pain (not just congestion) AND [2] fever  • Negative: Earache    Answer Assessment - Initial Assessment Questions  1. LOCATION: \"Where does it hurt?\"       No pain  2. ONSET: \"When did the sinus pain start?\"  (e.g., hours, days)       Years ago  3. SEVERITY: \"How bad is the pain?\"   (Scale 1-10; mild, moderate or severe)    - MILD (1-3): doesn't interfere with normal activities     - MODERATE (4-7): interferes with normal activities (e.g., work or school) or awakens from sleep    - SEVERE (8-10): excruciating pain and patient unable to do any normal activities        RIGHT nostril difficulty breathing.  4. RECURRENT SYMPTOM: \"Have you ever had sinus problems before?\" If so, ask: \"When was the last time?\" and \"What happened that time?\"       Years of sinus symptoms with difficulty breathing from  RIGHT nostril  5. NASAL CONGESTION: \"Is the nose blocked?\" If so, ask, \"Can you open it or must you breathe through the mouth?\"      Must breath through mouth when LEFT nostril is plugged  6. NASAL DISCHARGE: \"Do you have discharge from your nose?\" If so ask, \"What color?\"      no  7. FEVER: \"Do you have a fever?\" If so, ask: \"What is it, how was it measured, and when did it start?\"       no  8. OTHER SYMPTOMS: \"Do you have any other symptoms?\" (e.g., sore throat, cough, earache, difficulty breathing)      no  9. PREGNANCY: \"Is there any chance you are pregnant?\" \"When was your last menstrual period?\"      no    Protocols used: SINUS PAIN OR CONGESTION-A-AH      "

## 2021-05-29 ENCOUNTER — HOSPITAL ENCOUNTER (OUTPATIENT)
Dept: LAB | Facility: MEDICAL CENTER | Age: 41
End: 2021-05-29
Attending: NURSE PRACTITIONER
Payer: COMMERCIAL

## 2021-05-29 DIAGNOSIS — E78.1 HYPERTRIGLYCERIDEMIA: ICD-10-CM

## 2021-05-29 DIAGNOSIS — R73.9 ELEVATED BLOOD SUGAR: ICD-10-CM

## 2021-05-29 DIAGNOSIS — R03.0 ELEVATED BLOOD PRESSURE READING: ICD-10-CM

## 2021-05-29 LAB
ALBUMIN SERPL BCP-MCNC: 4.6 G/DL (ref 3.2–4.9)
ALBUMIN/GLOB SERPL: 1.7 G/DL
ALP SERPL-CCNC: 72 U/L (ref 30–99)
ALT SERPL-CCNC: 32 U/L (ref 2–50)
ANION GAP SERPL CALC-SCNC: 14 MMOL/L (ref 7–16)
AST SERPL-CCNC: 26 U/L (ref 12–45)
BASOPHILS # BLD AUTO: 0.7 % (ref 0–1.8)
BASOPHILS # BLD: 0.04 K/UL (ref 0–0.12)
BILIRUB SERPL-MCNC: 0.3 MG/DL (ref 0.1–1.5)
BUN SERPL-MCNC: 17 MG/DL (ref 8–22)
CALCIUM SERPL-MCNC: 9.6 MG/DL (ref 8.5–10.5)
CHLORIDE SERPL-SCNC: 107 MMOL/L (ref 96–112)
CHOLEST SERPL-MCNC: 275 MG/DL (ref 100–199)
CO2 SERPL-SCNC: 22 MMOL/L (ref 20–33)
CREAT SERPL-MCNC: 1 MG/DL (ref 0.5–1.4)
EOSINOPHIL # BLD AUTO: 0.22 K/UL (ref 0–0.51)
EOSINOPHIL NFR BLD: 3.9 % (ref 0–6.9)
ERYTHROCYTE [DISTWIDTH] IN BLOOD BY AUTOMATED COUNT: 39.5 FL (ref 35.9–50)
EST. AVERAGE GLUCOSE BLD GHB EST-MCNC: 100 MG/DL
FASTING STATUS PATIENT QL REPORTED: NORMAL
GLOBULIN SER CALC-MCNC: 2.7 G/DL (ref 1.9–3.5)
GLUCOSE SERPL-MCNC: 96 MG/DL (ref 65–99)
HBA1C MFR BLD: 5.1 % (ref 4–5.6)
HCT VFR BLD AUTO: 44.6 % (ref 42–52)
HDLC SERPL-MCNC: 25 MG/DL
HGB BLD-MCNC: 15.3 G/DL (ref 14–18)
IMM GRANULOCYTES # BLD AUTO: 0.02 K/UL (ref 0–0.11)
IMM GRANULOCYTES NFR BLD AUTO: 0.4 % (ref 0–0.9)
LDLC SERPL CALC-MCNC: ABNORMAL MG/DL
LYMPHOCYTES # BLD AUTO: 2.34 K/UL (ref 1–4.8)
LYMPHOCYTES NFR BLD: 41.2 % (ref 22–41)
MCH RBC QN AUTO: 30.3 PG (ref 27–33)
MCHC RBC AUTO-ENTMCNC: 34.3 G/DL (ref 33.7–35.3)
MCV RBC AUTO: 88.3 FL (ref 81.4–97.8)
MONOCYTES # BLD AUTO: 0.48 K/UL (ref 0–0.85)
MONOCYTES NFR BLD AUTO: 8.5 % (ref 0–13.4)
NEUTROPHILS # BLD AUTO: 2.58 K/UL (ref 1.82–7.42)
NEUTROPHILS NFR BLD: 45.3 % (ref 44–72)
NRBC # BLD AUTO: 0 K/UL
NRBC BLD-RTO: 0 /100 WBC
PLATELET # BLD AUTO: 329 K/UL (ref 164–446)
PMV BLD AUTO: 9.9 FL (ref 9–12.9)
POTASSIUM SERPL-SCNC: 4.6 MMOL/L (ref 3.6–5.5)
PROT SERPL-MCNC: 7.3 G/DL (ref 6–8.2)
RBC # BLD AUTO: 5.05 M/UL (ref 4.7–6.1)
SODIUM SERPL-SCNC: 143 MMOL/L (ref 135–145)
TRIGL SERPL-MCNC: 659 MG/DL (ref 0–149)
WBC # BLD AUTO: 5.7 K/UL (ref 4.8–10.8)

## 2021-05-29 PROCEDURE — 80061 LIPID PANEL: CPT

## 2021-05-29 PROCEDURE — 36415 COLL VENOUS BLD VENIPUNCTURE: CPT

## 2021-05-29 PROCEDURE — 80053 COMPREHEN METABOLIC PANEL: CPT

## 2021-05-29 PROCEDURE — 83036 HEMOGLOBIN GLYCOSYLATED A1C: CPT

## 2021-05-29 PROCEDURE — 85025 COMPLETE CBC W/AUTO DIFF WBC: CPT

## 2021-06-01 PROBLEM — R09.81 CHRONIC NASAL CONGESTION: Status: ACTIVE | Noted: 2021-06-01

## 2021-06-01 NOTE — ASSESSMENT & PLAN NOTE
Patient has seasonal allergies as well as chronic nasal congestion  He has tried many over-the-counter medications including second-generation antihistamines, nasal saline and but finds that he is consistently using Afrin  We did discuss the risks associated with chronic use of Afrin  Upon physical examination it does appear that his symptom is mildly deviated  It was recommended that he use second-generation antihistamine, Flonase and generous use of nasal saline in the interim until he can get in with ENT for further evaluation of his chronic nasal congestion

## 2021-06-01 NOTE — ASSESSMENT & PLAN NOTE
Patient has been diagnosed in the past with TOMER, diagnosed clinically based on symptoms, in the past he was referred to sleep study but never did have this done  He is having significant daytime sleepiness and has excessive snoring at night  He is ready to follow through with sleep study, referral has been placed

## 2021-06-03 ENCOUNTER — OFFICE VISIT (OUTPATIENT)
Dept: MEDICAL GROUP | Facility: PHYSICIAN GROUP | Age: 41
End: 2021-06-03
Payer: COMMERCIAL

## 2021-06-03 VITALS
BODY MASS INDEX: 31.92 KG/M2 | OXYGEN SATURATION: 99 % | WEIGHT: 223 LBS | HEIGHT: 70 IN | DIASTOLIC BLOOD PRESSURE: 80 MMHG | HEART RATE: 74 BPM | SYSTOLIC BLOOD PRESSURE: 124 MMHG | RESPIRATION RATE: 20 BRPM | TEMPERATURE: 98.1 F

## 2021-06-03 DIAGNOSIS — E78.1 HYPERTRIGLYCERIDEMIA: ICD-10-CM

## 2021-06-03 DIAGNOSIS — G47.00 INSOMNIA, UNSPECIFIED TYPE: ICD-10-CM

## 2021-06-03 PROCEDURE — 99213 OFFICE O/P EST LOW 20 MIN: CPT | Performed by: NURSE PRACTITIONER

## 2021-06-03 RX ORDER — ATORVASTATIN CALCIUM 20 MG/1
20 TABLET, FILM COATED ORAL DAILY
Qty: 90 TABLET | Refills: 3 | Status: SHIPPED | OUTPATIENT
Start: 2021-06-03 | End: 2022-06-08

## 2021-06-03 RX ORDER — ZALEPLON 5 MG/1
5 CAPSULE ORAL NIGHTLY PRN
Qty: 30 CAPSULE | Refills: 2 | Status: SHIPPED | OUTPATIENT
Start: 2021-06-03 | End: 2021-09-10

## 2021-06-03 ASSESSMENT — PATIENT HEALTH QUESTIONNAIRE - PHQ9
9. THOUGHTS THAT YOU WOULD BE BETTER OFF DEAD, OR OF HURTING YOURSELF: NOT AT ALL
4. FEELING TIRED OR HAVING LITTLE ENERGY: NOT AT ALL
5. POOR APPETITE OR OVEREATING: NOT AT ALL
2. FEELING DOWN, DEPRESSED, IRRITABLE, OR HOPELESS: NOT AT ALL
SUM OF ALL RESPONSES TO PHQ9 QUESTIONS 1 AND 2: 0
6. FEELING BAD ABOUT YOURSELF - OR THAT YOU ARE A FAILURE OR HAVE LET YOURSELF OR YOUR FAMILY DOWN: NOT AL ALL
3. TROUBLE FALLING OR STAYING ASLEEP OR SLEEPING TOO MUCH: NOT AT ALL
8. MOVING OR SPEAKING SO SLOWLY THAT OTHER PEOPLE COULD HAVE NOTICED. OR THE OPPOSITE, BEING SO FIGETY OR RESTLESS THAT YOU HAVE BEEN MOVING AROUND A LOT MORE THAN USUAL: NOT AT ALL
SUM OF ALL RESPONSES TO PHQ QUESTIONS 1-9: 0
7. TROUBLE CONCENTRATING ON THINGS, SUCH AS READING THE NEWSPAPER OR WATCHING TELEVISION: NOT AT ALL
1. LITTLE INTEREST OR PLEASURE IN DOING THINGS: NOT AT ALL

## 2021-06-03 ASSESSMENT — FIBROSIS 4 INDEX: FIB4 SCORE: 0.57

## 2021-06-03 NOTE — PROGRESS NOTES
Chief Complaint   Patient presents with   • Follow-Up   • Sleep Problem       HISTORY OF PRESENT ILLNESS: Patient is a 40 y.o. male established patient who presents today to review labs, discuss insomnia    Insomnia  Patient does continue to suffer from insomnia, able to fall asleep but not stay asleep  He does understand that much of his insomnia is related to TOMER which is currently being worked up, he will be scheduled for sleep study soon  He has tried over-the-counter medications that do not work very well in the past he has been on Sonata which seemed to work well and is requesting a refill  This is reasonable and has been refilled  [place]  reviewed, no evidence of concerning behavior      Hypertriglyceridemia  Patient was found to have significant hypertriglyceridemia but much improved since this was last measured in December 2019 in which his triglycerides were over 1300  They are now down to the mid 600s, he does continue on fenofibrate 145 mg  He does agree to augmenting this medication with statin and will take a atorvastatin 20 mg daily  Patient to follow-up in 3 to 4 months with labs done before visit  Again discussed the importance of ongoing healthy lifestyle modifications      Patient Active Problem List    Diagnosis Date Noted   • Chronic nasal congestion 06/01/2021   • Seasonal allergies 05/26/2021   • Non-recurrent bilateral inguinal hernia without obstruction or gangrene 01/19/2021   • Insomnia 12/16/2019   • Irregular heart beat 12/12/2019   • Major depressive disorder 12/12/2019   • History of tobacco abuse 11/26/2018   • Elevated blood pressure reading 08/08/2018   • Disordered sleep 08/08/2018   • TOMER (obstructive sleep apnea) 08/08/2018   • Gastroesophageal reflux disease 08/08/2018   • Obesity (BMI 30-39.9) 08/08/2018   • Elevated blood sugar 03/31/2017   • Vitamin D deficiency 09/22/2015   • Low testosterone 09/15/2015   • LFTs abnormal 09/15/2015   • BMI 34.0-34.9,adult 09/10/2015   •  Fatigue 09/10/2015   • Palpitations 09/10/2015   • Hypertriglyceridemia 2013       Allergies:Patient has no known allergies.    Current Outpatient Medications   Medication Sig Dispense Refill   • atorvastatin (LIPITOR) 20 MG Tab Take 1 tablet by mouth every day. 90 tablet 3   • zaleplon (SONATA) 5 MG capsule Take 1 capsule by mouth at bedtime as needed for Insomnia for up to 90 days. 30 capsule 2   • fenofibrate (TRICOR) 145 MG Tab Take 1 Tab by mouth every day. 90 Tab 0     No current facility-administered medications for this visit.       Social History     Tobacco Use   • Smoking status: Former Smoker     Packs/day: 0.50     Years: 20.00     Pack years: 10.00     Types: Cigars, Cigarettes     Start date: 2018   • Smokeless tobacco: Former User     Quit date: 2017   • Tobacco comment: 1 can qod as of  uses nicotine pouches   Vaping Use   • Vaping Use: Former   Substance Use Topics   • Alcohol use: Yes     Comment: 6 pack per week   • Drug use: Yes     Frequency: 7.0 times per week     Types: Marijuana, Inhaled     Comment: vape marijuana- denies h/o heroin, cocaine; h/o meth x 4, smoke, quit around .       Family Status   Relation Name Status   • Mo  Alive   • Fa  Alive   • MGMo     • MGFa     • PGMo     • PGFa     • PAunt  (Not Specified)   • Neg Hx  (Not Specified)     Family History   Problem Relation Age of Onset   • Hypertension Father    • Cancer Paternal Aunt         lung   • Heart Disease Neg Hx    • Diabetes Neg Hx    • Stroke Neg Hx    • Hyperlipidemia Neg Hx        Review of Systems:   Constitutional: Negative for fever, chills, weight loss and malaise/fatigue.   Respiratory: Negative for cough, sputum production, shortness of breath and wheezing.    Cardiovascular: Negative for chest pain, palpitations, orthopnea and leg swelling.   Gastrointestinal: Negative for heartburn, nausea, vomiting and abdominal pain.   Genitourinary/Renal: Negative for  "dysuria, urgency and frequency.   Musculoskeletal: Negative for myalgias, back pain and joint pain.   Skin: Negative for rash and itching.   Neurological: Negative for dizziness, tingling, tremors, sensory change, focal weakness and headaches.   Endo/Heme/Allergies: Does not bruise/bleed easily.   Psychiatric/Behavioral: Positive for insomnia    Exam:  /80   Pulse 74   Temp 36.7 °C (98.1 °F) (Temporal)   Resp 20   Ht 1.778 m (5' 10\")   Wt 101 kg (223 lb)   SpO2 99%   General:  Well nourished, well developed male in NAD  Skin: warm, dry, intact, no evidence of rash or concerning lesions  Head: is grossly normal.  HEENT: eyes clear, conjunctiva normal, PERRLA,TMs normal; bilaterally  Neck: Supple without JVD or bruit. Thyroid is not enlarged.  Pulmonary: Regular rate, rhythm and effort  Musculoskeletal: no clubbing, cyanosis, or edema.  Psych/mental: no depression, anxiety, hallucinations  Neuro: alert, intact, CN 2-12 grossly intact    Medical decision-making and discussion:    As mentioned above, patient will continue with healthy lifestyle modifications and continue with fenofibrate but will add atorvastatin.  Repeat labs again in 3 to 4 months.  Sonata has been called in, he is to take this at bedtime as needed for insomnia.        Assessment/Plan:  Braden was seen today for follow-up and sleep problem.    Diagnoses and all orders for this visit:    Hypertriglyceridemia  -     atorvastatin (LIPITOR) 20 MG Tab; Take 1 tablet by mouth every day.  -     Comp Metabolic Panel; Future  -     Lipid Profile; Future    Insomnia, unspecified type  -     zaleplon (SONATA) 5 MG capsule; Take 1 capsule by mouth at bedtime as needed for Insomnia for up to 90 days.         Return in about 4 months (around 10/3/2021) for Follow-up, Discuss Labs.    Please note that this dictation was created using voice recognition software. I have made every reasonable attempt to correct obvious errors, but I expect that there are " errors of grammar and possibly content that I did not discover before finalizing the note.

## 2021-06-03 NOTE — ASSESSMENT & PLAN NOTE
Patient was found to have significant hypertriglyceridemia but much improved since this was last measured in December 2019 in which his triglycerides were over 1300  They are now down to the mid 600s, he does continue on fenofibrate 145 mg  He does agree to augmenting this medication with statin and will take a atorvastatin 20 mg daily  Patient to follow-up in 3 to 4 months with labs done before visit  Again discussed the importance of ongoing healthy lifestyle modifications

## 2021-06-03 NOTE — ASSESSMENT & PLAN NOTE
Patient does continue to suffer from insomnia, able to fall asleep but not stay asleep  He does understand that much of his insomnia is related to TOMER which is currently being worked up, he will be scheduled for sleep study soon  He has tried over-the-counter medications that do not work very well in the past he has been on Sonata which seemed to work well and is requesting a refill  This is reasonable and has been ref[facility]  [facility] no evidence of concerning behavior

## 2021-07-16 ENCOUNTER — APPOINTMENT (OUTPATIENT)
Dept: RADIOLOGY | Facility: IMAGING CENTER | Age: 41
End: 2021-07-16
Attending: NURSE PRACTITIONER
Payer: COMMERCIAL

## 2021-07-16 ENCOUNTER — OFFICE VISIT (OUTPATIENT)
Dept: URGENT CARE | Facility: PHYSICIAN GROUP | Age: 41
End: 2021-07-16
Payer: COMMERCIAL

## 2021-07-16 VITALS
RESPIRATION RATE: 12 BRPM | TEMPERATURE: 98.6 F | WEIGHT: 231 LBS | DIASTOLIC BLOOD PRESSURE: 86 MMHG | SYSTOLIC BLOOD PRESSURE: 122 MMHG | OXYGEN SATURATION: 96 % | HEART RATE: 93 BPM | BODY MASS INDEX: 33.07 KG/M2 | HEIGHT: 70 IN

## 2021-07-16 DIAGNOSIS — M25.461 PAIN AND SWELLING OF RIGHT KNEE: ICD-10-CM

## 2021-07-16 DIAGNOSIS — M25.561 PAIN AND SWELLING OF RIGHT KNEE: ICD-10-CM

## 2021-07-16 DIAGNOSIS — S86.911A STRAIN OF RIGHT KNEE, INITIAL ENCOUNTER: ICD-10-CM

## 2021-07-16 PROCEDURE — 73562 X-RAY EXAM OF KNEE 3: CPT | Mod: TC,FY,RT | Performed by: NURSE PRACTITIONER

## 2021-07-16 PROCEDURE — 99214 OFFICE O/P EST MOD 30 MIN: CPT | Performed by: NURSE PRACTITIONER

## 2021-07-16 RX ORDER — METHYLPREDNISOLONE 4 MG/1
TABLET ORAL
Qty: 21 TABLET | Refills: 0 | Status: SHIPPED | OUTPATIENT
Start: 2021-07-16 | End: 2022-06-08

## 2021-07-16 ASSESSMENT — ENCOUNTER SYMPTOMS
NAUSEA: 0
FEVER: 0
TINGLING: 0
FALLS: 0
DIZZINESS: 0
CHILLS: 0

## 2021-07-16 ASSESSMENT — FIBROSIS 4 INDEX: FIB4 SCORE: 0.57

## 2021-07-16 NOTE — LETTER
July 16, 2021       Patient: Braden Hill   YOB: 1980   Date of Visit: 7/16/2021         To Whom It May Concern:    In my medical opinion, I recommend that Braden Hill return to full duty, no restrictions on 07/19/21.              Sincerely,          LUPE ValdovinosPEDISON  Electronically Signed

## 2021-07-17 ENCOUNTER — TELEPHONE (OUTPATIENT)
Dept: URGENT CARE | Facility: PHYSICIAN GROUP | Age: 41
End: 2021-07-17

## 2021-07-17 NOTE — TELEPHONE ENCOUNTER
Pt called and states that he was supposed to have Monday off and the notes says that he is supposed to work Monday-he states that it is supposed to say that he can return 7/20 not 7/19  Please advise

## 2021-07-17 NOTE — PROGRESS NOTES
Braden Hill is a 40 y.o. male who presents for Knee Pain (R knee, x2days )      HPI this new problem.  Cristóbal is a 40-year-old male patient presents with right knee pain for 2 days.  It is swollen, red and a little bit hot to touch.  It hurts when he moves it actively.  It does not hurt as much if he holds it still or its moved passively.  Pain is slowly progressively getting worse.  He reports it is 7/10.  He has been taking a lot of ibuprofen.  He thinks he has been taking 800 mg every few hours.  He reports that it does not touch the pain.  He has tried ice packs and heat packs as well as elevating it up.  Pain is sharp.  Rubbing his knee helps a little bit.  He has no history of gout or arthritis.  He denies trauma or unusual activity.  He walks 5 to 6 miles a day at his job.  He is concerned about upcoming shifts where he has to walk.  No other aggravating or alleviating factors.      Review of Systems   Constitutional: Negative for chills, fever and malaise/fatigue.   Gastrointestinal: Negative for nausea.   Musculoskeletal: Positive for joint pain. Negative for falls.   Neurological: Negative for dizziness and tingling.       Allergies:     No Known Allergies    PMSFS Hx:  Past Medical History:   Diagnosis Date   • Has stopped breathing 9/21/2013    At night, fatigued all the time   • Heart burn    • High cholesterol    • Hypertriglyceridemia 9/21/2013   • Indigestion    • Sleep apnea     no cpap   • Snoring      Past Surgical History:   Procedure Laterality Date   • INGUINAL HERNIA REPAIR ROBOTIC XI Right 2/22/2021    Procedure: REPAIR, HERNIA, INGUINAL, ROBOT-ASSISTED, USING DA GONZALO XI;  Surgeon: Ian Latham M.D.;  Location: SURGERY [facility];  Service: Gen Robotic   • REPAIR, HERNIA, UMBILICAL, ROBOT-ASSISTED, USING DA TAYE  2/22/2021    Procedure: REPAIR,HERNIA,UMBILICAL,ROBOT-ASSISTED,USING DA GONZALO XI;  Surgeon: Ian Latham M.D.;  Location: [facility];  Service: Gen Robotic  "    Family History   Problem Relation Age of Onset   • Hypertension Father    • Cancer Paternal Aunt         lung   • Heart Disease Neg Hx    • Diabetes Neg Hx    • Stroke Neg Hx    • Hyperlipidemia Neg Hx      Social History     Tobacco Use   • Smoking status: Former Smoker     Packs/day: 0.50     Years: 20.00     Pack years: 10.00     Types: Cigars, Cigarettes     Start date: 2/28/2018   • Smokeless tobacco: Former User     Quit date: 1/1/2017   • Tobacco comment: 1 can qod as of 2/21 uses nicotine pouches   Substance Use Topics   • Alcohol use: Yes     Comment: 6 pack per week       Problems:   Patient Active Problem List   Diagnosis   • Hypertriglyceridemia   • BMI 34.0-34.9,adult   • Fatigue   • Palpitations   • Low testosterone   • LFTs abnormal   • Vitamin D deficiency   • Elevated blood sugar   • Elevated blood pressure reading   • Disordered sleep   • TOMER (obstructive sleep apnea)   • Gastroesophageal reflux disease   • Obesity (BMI 30-39.9)   • History of tobacco abuse   • Irregular heart beat   • Major depressive disorder   • Insomnia   • Non-recurrent bilateral inguinal hernia without obstruction or gangrene   • Seasonal allergies   • Chronic nasal congestion       Medications:   Current Outpatient Medications on File Prior to Visit   Medication Sig Dispense Refill   • atorvastatin (LIPITOR) 20 MG Tab Take 1 tablet by mouth every day. 90 tablet 3   • zaleplon (SONATA) 5 MG capsule Take 1 capsule by mouth at bedtime as needed for Insomnia for up to 90 days. 30 capsule 2   • fenofibrate (TRICOR) 145 MG Tab Take 1 Tab by mouth every day. 90 Tab 0     No current facility-administered medications on file prior to visit.          Objective:     /86   Pulse 93   Temp 37 °C (98.6 °F) (Temporal)   Resp 12   Ht 1.778 m (5' 10\")   Wt 105 kg (231 lb)   SpO2 96%   BMI 33.15 kg/m²     Physical Exam  Vitals and nursing note reviewed.   Constitutional:       Appearance: He is well-developed. "   Cardiovascular:      Rate and Rhythm: Normal rate.      Pulses: Normal pulses.   Pulmonary:      Effort: Pulmonary effort is normal. No respiratory distress.   Musculoskeletal:      Right upper leg: Normal.      Right knee: Swelling, effusion and erythema present. No crepitus. Decreased range of motion. Tenderness (generalized) present. Normal alignment, normal meniscus and normal patellar mobility.      Instability Tests: Anterior drawer test negative. Posterior drawer test negative.      Right lower leg: Normal.        Legs:    Skin:     General: Skin is warm and dry.      Capillary Refill: Capillary refill takes less than 2 seconds.   Neurological:      Mental Status: He is alert and oriented to person, place, and time.      Cranial Nerves: No cranial nerve deficit.      Coordination: Coordination normal.      Deep Tendon Reflexes: Reflexes are normal and symmetric.   Psychiatric:         Mood and Affect: Mood normal.         Speech: Speech normal.         Behavior: Behavior normal.         Thought Content: Thought content normal.       RADIOLOGY RESULTS   DX-KNEE 3 VIEWS RIGHT    Result Date: 7/16/2021 7/16/2021 6:28 PM HISTORY/REASON FOR EXAM:  Right knee pain, swelling TECHNIQUE/EXAM DESCRIPTION AND NUMBER OF VIEWS:  3 views of the RIGHT knee. COMPARISON: None FINDINGS: BONE MINERALIZATION: Normal. JOINTS: Preserved. No erosions. FRACTURE: None. DISLOCATION: None. SOFT TISSUES: No mass.     No acute osseous abnormality.         Xray: Reviewed and interpreted independently by me. I agree with the radiologist's findings.       Assessment /Associated Orders:      1. Strain of right knee, initial encounter  methylPREDNISolone (MEDROL DOSEPAK) 4 MG Tablet Therapy Pack   2. Pain and swelling of right knee  DX-KNEE 3 VIEWS RIGHT       Medical Decision Making:    Pt is clinically stable at today's acute urgent care visit.  No acute distress noted. Appropriate for outpatient management at this time.   Acute problem  today with uncertain prognosis.   Differential Diagnosis includes but is not limited to: gouty arthritis, knee strain/ overuse  Educated in proper administration of medication(s) ordered today including safety, possible SE, risks, benefits, rationale and alternatives to therapy.   Do not take additional NSAIDS or steroids while taking RX medication. Educated on risk of lower immunity in short term, weight changes, mood changes, increased serum glucose and elevated BP while taking steroids.   OTC acetaminophen for breakthrough pain. Dosage and directions per . Do not exceed 3000 mg in 24 hours.   Ice  Knee brace -hinged   Heat packs prn   Elevation  Keep well hydrated      Advised to follow-up with the primary care provider for recheck, reevaluation, and consideration of further management if necessary.   Discussed management options (risks,benefits, and alternatives to treatment). Expressed understanding and the treatment plan was agreed upon. Questions were encouraged and answered       Return to urgent care prn if new or worsening sx or if there is no improvement in condition prn.  Educated in Red flags and indications to immediately call 911 or present to the Emergency Department.     I personally reviewed prior external notes and test results pertinent to today's visit.  I have independently reviewed and interpreted all diagnostics ordered during this urgent care acute visit.   Time spent evaluating this patient was at least 30 minutes and includes preparing for visit, counseling/education, exam and evaluation, obtaining history, independent interpretation, ordering lab/test/procedures,medication management and documentation.

## 2021-07-25 ENCOUNTER — HOSPITAL ENCOUNTER (EMERGENCY)
Facility: MEDICAL CENTER | Age: 41
End: 2021-07-25
Attending: EMERGENCY MEDICINE
Payer: COMMERCIAL

## 2021-07-25 VITALS
BODY MASS INDEX: 34.18 KG/M2 | RESPIRATION RATE: 17 BRPM | OXYGEN SATURATION: 96 % | WEIGHT: 238.76 LBS | DIASTOLIC BLOOD PRESSURE: 92 MMHG | HEIGHT: 70 IN | HEART RATE: 100 BPM | TEMPERATURE: 98 F | SYSTOLIC BLOOD PRESSURE: 141 MMHG

## 2021-07-25 DIAGNOSIS — M70.41 PREPATELLAR BURSITIS OF RIGHT KNEE: ICD-10-CM

## 2021-07-25 PROCEDURE — A9270 NON-COVERED ITEM OR SERVICE: HCPCS | Performed by: EMERGENCY MEDICINE

## 2021-07-25 PROCEDURE — 99283 EMERGENCY DEPT VISIT LOW MDM: CPT

## 2021-07-25 PROCEDURE — 700102 HCHG RX REV CODE 250 W/ 637 OVERRIDE(OP): Performed by: EMERGENCY MEDICINE

## 2021-07-25 RX ORDER — CEPHALEXIN 500 MG/1
500 CAPSULE ORAL ONCE
Status: COMPLETED | OUTPATIENT
Start: 2021-07-25 | End: 2021-07-25

## 2021-07-25 RX ORDER — CEPHALEXIN 500 MG/1
500 CAPSULE ORAL 4 TIMES DAILY
Qty: 28 CAPSULE | Refills: 0 | Status: SHIPPED | OUTPATIENT
Start: 2021-07-25 | End: 2021-08-01

## 2021-07-25 RX ADMIN — CEPHALEXIN 500 MG: 500 CAPSULE ORAL at 18:53

## 2021-07-25 ASSESSMENT — ENCOUNTER SYMPTOMS
FEVER: 0
NAUSEA: 0
VOMITING: 0
CHILLS: 0

## 2021-07-25 ASSESSMENT — FIBROSIS 4 INDEX: FIB4 SCORE: 0.57

## 2021-07-26 NOTE — ED TRIAGE NOTES
"Chief Complaint   Patient presents with   • Knee Pain     x2 weeks. Intermittent pain. Denies trauma.   • Leg Swelling     R side.     /98   Pulse (!) 106   Temp 36.4 °C (97.5 °F) (Temporal)   Resp 18   Ht 1.778 m (5' 10\")   Wt 108 kg (238 lb 12.1 oz)   SpO2 96%   BMI 34.26 kg/m²     Pt ambulated into triage, mask in place. Pt seen at  on Friday and given steroid with minimal relief. NAD, encouraged to return to the triage nurse or tech with any new complaints or symptoms.  "

## 2021-07-26 NOTE — ED NOTES
..Pt verbalizes understanding of dc instructions. Rx given.Pt states all questions have been answered and they feel comfortable with dc instructions provided. Pt states has all personal belonging. Pt to lobby w/o incident

## 2021-07-26 NOTE — ED PROVIDER NOTES
ED Provider Note    Scribed for Pernell Rose M.D. by Treasure Johnson. 7/25/2021, 6:08 PM.    Primary care provider: MONICA Denton  Means of arrival: Walk in  History obtained from: patient   History limited by: none    CHIEF COMPLAINT  Chief Complaint   Patient presents with   • Knee Pain     x2 weeks. Intermittent pain. Denies trauma.   • Leg Swelling     R side.       HPI  Braden Hill is a 40 y.o. male who presents to the Emergency Department right knee pain onset 2 weeks ago. He endorses working out more recently and walking on the treadmill, but denies any known injury or trauma. Additionally he states he bends over and walks a lot while at work, but has been off of work for 10 days and his symptoms have been the same. He has associated swelling and occasional clicking, but denies locking. Patient has alleviation with heat, but denies any changes with ice. He was seen at urgent care recently where he had an x-ray, however it was unremarkable. Denies fevers, chills, nausea, or vomiting    REVIEW OF SYSTEMS  Review of Systems   Constitutional: Negative for chills and fever.   Gastrointestinal: Negative for nausea and vomiting.   Musculoskeletal: Positive for joint pain (right knee pain).        Positive right knee swelling     PAST MEDICAL HISTORY   has a past medical history of Has stopped breathing (9/21/2013), Heart burn, High cholesterol, Hypertriglyceridemia (9/21/2013), Indigestion, Sleep apnea, and Snoring.    SURGICAL HISTORY   has a past surgical history that includes inguinal hernia repair robotic xi (Right, 2/22/2021) and repair, hernia, umbilical, robot-assisted, using da ramez (2/22/2021).    SOCIAL HISTORY  Social History     Tobacco Use   • Smoking status: Former Smoker     Packs/day: 0.50     Years: 20.00     Pack years: 10.00     Types: Cigars, Cigarettes     Start date: 2/28/2018   • Smokeless tobacco: Former User     Quit date: 1/1/2017   • Tobacco comment: 1 can qod as of  "2/21 uses nicotine pouches   Vaping Use   • Vaping Use: Former   Substance Use Topics   • Alcohol use: Yes     Comment: 6 pack per week   • Drug use: Yes     Frequency: 7.0 times per week     Types: Marijuana, Inhaled     Comment: vape marijuana- denies h/o heroin, cocaine; h/o meth x 4, smoke, quit around 2000.      Social History     Substance and Sexual Activity   Drug Use Yes   • Frequency: 7.0 times per week   • Types: Marijuana, Inhaled    Comment: vape marijuana- denies h/o heroin, cocaine; h/o meth x 4, smoke, quit around 2000.       FAMILY HISTORY  Family History   Problem Relation Age of Onset   • Hypertension Father    • Cancer Paternal Aunt         lung   • Heart Disease Neg Hx    • Diabetes Neg Hx    • Stroke Neg Hx    • Hyperlipidemia Neg Hx        CURRENT MEDICATIONS  Home Medications     Reviewed by Manuela Grewal R.N. (Registered Nurse) on 07/25/21 at 1714  Med List Status: Not Addressed   Medication Last Dose Status   atorvastatin (LIPITOR) 20 MG Tab  Active   fenofibrate (TRICOR) 145 MG Tab  Active   methylPREDNISolone (MEDROL DOSEPAK) 4 MG Tablet Therapy Pack  Active   zaleplon (SONATA) 5 MG capsule  Active                ALLERGIES  No Known Allergies    PHYSICAL EXAM  VITAL SIGNS: /98   Pulse (!) 106   Temp 36.4 °C (97.5 °F) (Temporal)   Resp 18   Ht 1.778 m (5' 10\")   Wt 108 kg (238 lb 12.1 oz)   SpO2 96%   BMI 34.26 kg/m²     Constitutional: No acute distress  HENT: Moist mucous membranes  Eyes: No conjunctivitis or icterus  Skin:. no rash  Extremities: minimal cobble stone in subcutaneous tissue, underlying bursa fluid, minimal erythema.  Vascular: symmetric radial pulse  Neurologic: Normal gross motor    COURSE & MEDICAL DECISION MAKING  Pertinent Labs & Imaging studies reviewed. (See chart for details)    6:08 PM - Patient seen and examined at bedside. Bedside US showed a small amount of bursa fluid. I discussed the patient likely has bursitis and he will need to be started " on antibiotics. I do not think he has a concerning infection at this time, and there is not enough fluid at this time to drain it. Patient will be treated with Keflex. Prescribed Keflex. Advised the patient to follow up with an orthopedic specialist if his symptoms do not improve with the antibiotics, or return to the ED if his symptoms worsen significantly. Discussed return precautions. Patient will be discharged at this time. He verbalizes agreement with discharge and plan of care.         The patient will return for new or worsening symptoms and is stable at the time of discharge.    DISPOSITION:  Patient will be discharged home in stable condition.    FOLLOW UP:  No follow-up provider specified.    OUTPATIENT MEDICATIONS:  New Prescriptions    CEPHALEXIN (KEFLEX) 500 MG CAP    Take 1 capsule by mouth 4 times a day for 7 days.        FINAL IMPRESSION  1. Prepatellar bursitis of right knee          Treasure MILNER ([place]), am scribing for, and in the presence of, Pernell Rose M.D..    Electronically signed by: Treasure Johnson (Karie), 7/25/2021    IPernell M.D. personally performed the services described in this documentation, as scribed by Treasure Johnson in my presence, and it is both accurate and complete. C    The note accurately reflects work and decisions made by me.  Pernell Rose M.D.  7/25/2021  11:46 PM

## 2021-08-03 ENCOUNTER — SLEEP CENTER VISIT (OUTPATIENT)
Dept: SLEEP MEDICINE | Facility: MEDICAL CENTER | Age: 41
End: 2021-08-03
Payer: COMMERCIAL

## 2021-08-03 VITALS
OXYGEN SATURATION: 95 % | DIASTOLIC BLOOD PRESSURE: 82 MMHG | RESPIRATION RATE: 14 BRPM | WEIGHT: 231 LBS | SYSTOLIC BLOOD PRESSURE: 118 MMHG | BODY MASS INDEX: 33.07 KG/M2 | HEIGHT: 70 IN | HEART RATE: 87 BPM

## 2021-08-03 DIAGNOSIS — G47.30 SLEEP DISORDER BREATHING: ICD-10-CM

## 2021-08-03 DIAGNOSIS — F51.04 CHRONIC INSOMNIA: ICD-10-CM

## 2021-08-03 DIAGNOSIS — G47.52 DREAM ENACTMENT BEHAVIOR: ICD-10-CM

## 2021-08-03 PROCEDURE — 99204 OFFICE O/P NEW MOD 45 MIN: CPT | Performed by: FAMILY MEDICINE

## 2021-08-03 ASSESSMENT — FIBROSIS 4 INDEX: FIB4 SCORE: 0.57

## 2021-08-03 NOTE — PROGRESS NOTES
"  [facility]  Consult Note     Date: 8/3/2021 / Time: 1:40 PM    Patient ID:   Name:             Braden Hill   YOB: 1980  Age:                 40 y.o.  male   MRN:               [id number]      Thank you for requesting a sleep medicine consultation on Braden Hill at the sleep center. He presents today with the chief complaints of snoring, breathing pauses noticed by his wife, trouble falling/staying asleep, leg jerks and non restful sleep. He is referred by Kaylee Knox for evaluation and treatment of sleep disorder breathing . He is currently using a non prescribed CPAP.     HISTORY OF PRESENT ILLNESS:       At night,  Braden Hill goes to bed around 10 pm on weekdays and on the weekends. He gets out of bed at 5 am on weekdays and at 7-8 am on the weekends.He averages about 6 hrs of sleep on a good night and 5 hrs on a bad night. Pt has bad nights are on work nights. It usually takes him 60 mins to fall asleep. He is currently on sonata 5 mg for last 2 months. He does not feel any improvement on the medication.  He wakes up about 2-5 times during the night due to bathroom use and on average It takes him few min to fall back asleep.     He is aware of snoring/breathing pauses/gasping or choking in sleep.  He  denies any symptoms of restless legs syndrome such as an \"urge to move\" his legs in the evening or bedtime. However his wife mentioned about leg kicking in his sleep. He  denies any symptoms of narcolepsy such as sleep paralysis or cataplexy, or any symptoms to suggest parasomnias such as sleep walking. He also mentioned about nightmares and acting out the dreams. He has jumped out of bed, hit the night stand and grabbed his wife. It has been going on for 10 years and happens about once every 2 months. However it has been worse in last 8 months. It does not happen when he uses the CPAP.  Denies any symptoms of Parkinson disease.      Overall, he does not finds his " sleep refreshing. In terms of  excessive daytime sleepiness,  He complains of sleepiness while  at work, while reading or watching TV and occasionally while driving. [place] sleepiness scale score is 10/24. He does not take regular naps. He drinks about 2 caffeinated beverages per day.      REVIEW OF SYSTEMS:       Constitutional: Denies fevers, Denies weight changes  Eyes: Denies changes in vision, no eye pain  Ears/Nose/Throat/Mouth: + nasal congestion or sore throat   Cardiovascular: Denies chest pain or palpitations   Respiratory:+shortness of breath , Denies cough  Gastrointestinal/Hepatic: Denies abdominal pain, nausea, vomiting,  Genitourinary: Denies bladder dysfunction, dysuria or frequency, + nocturia   Musculoskeletal/Rheum: Denies  joint pain and swelling   Skin/Breast: Denies rash  Neurological: Denies headache, confusion, memory loss or focal weakness/parasthesias  Psychiatric: denies mood disorder     Comprehensive review of systems form is reviewed with the patient and is attached in the EMR.     PMH:  has a past medical history of Has stopped breathing (9/21/2013), Heart burn, High cholesterol, Hypertriglyceridemia (9/21/2013), Indigestion, Sleep apnea, and Snoring.  MEDS:   Current Outpatient Medications:   •  atorvastatin (LIPITOR) 20 MG Tab, Take 1 tablet by mouth every day., Disp: 90 tablet, Rfl: 3  •  zaleplon (SONATA) 5 MG capsule, Take 1 capsule by mouth at bedtime as needed for Insomnia for up to 90 days., Disp: 30 capsule, Rfl: 2  •  fenofibrate (TRICOR) 145 MG Tab, Take 1 Tab by mouth every day., Disp: 90 Tab, Rfl: 0  •  methylPREDNISolone (MEDROL DOSEPAK) 4 MG Tablet Therapy Pack, Follow schedule on package instructions. (Patient not taking: Reported on 8/3/2021), Disp: 21 tablet, Rfl: 0  ALLERGIES: No Known Allergies  SURGHX:   Past Surgical History:   Procedure Laterality Date   • INGUINAL HERNIA REPAIR ROBOTIC XI Right 2/22/2021    Procedure: REPAIR, HERNIA, INGUINAL, ROBOT-ASSISTED,  "USING DA GONZALO XI;  Surgeon: Ian Latham M.D.;  Location: SURGERY [facility];  Service: Gen Robotic   • REPAIR, HERNIA, UMBILICAL, ROBOT-ASSISTED, USING DA TAYE  2/22/2021    Procedure: REPAIR,HERNIA,UMBILICAL,ROBOT-ASSISTED,USING DA GONZALO XI;  Surgeon: Ian Latham M.D.;  Location: SURGERY [facility];  Service: Gen Robotic     SOCHX:  reports that he has quit smoking. His smoking use included cigars and cigarettes. He started smoking about 3 years ago. He has a 10.00 pack-year smoking history. He quit smokeless tobacco use about 4 years ago. He reports current alcohol use. He reports current drug use. Frequency: 7.00 times per week. Drugs: Marijuana and Inhaled..   FH:   Family History   Problem Relation Age of Onset   • Hypertension Father    • Cancer Paternal Aunt         lung   • Heart Disease Neg Hx    • Diabetes Neg Hx    • Stroke Neg Hx    • Hyperlipidemia Neg Hx        Physical Exam:  Vitals/ General Appearance:   Weight/BMI: Body mass index is 33.15 kg/m².  /82 (BP Location: Left arm, Patient Position: Sitting, BP Cuff Size: Adult)   Pulse 87   Resp 14   Ht 1.778 m (5' 10\")   Wt 105 kg (231 lb)   SpO2 95%   Vitals:    08/03/21 1315   BP: 118/82   BP Location: Left arm   Patient Position: Sitting   BP Cuff Size: Adult   Pulse: 87   Resp: 14   SpO2: 95%   Weight: 105 kg (231 lb)   Height: 1.778 m (5' 10\")           Constitutional: Alert, no distress, well-groomed.  Skin: No rashes in visible areas.  Eye: Round. Conjunctiva clear, lids normal. No icterus.   ENMT: Lips pink without lesions, good dentition, moist mucous membranes. Phonation normal.  Neck: No masses, no thyromegaly. Moves freely without pain.  CV: Pulse as reported by patient  Respiratory: Unlabored respiratory effort, no cough or audible wheeze  Psych: Alert and oriented x3, normal affect and mood.   INVESTIGATIONS:       ASSESSMENT AND PLAN     1. He  has symptoms of Obstructive Sleep Apnea (TOMER).     We have discussed " diagnostic options including in-laboratory, attended polysomnography and home sleep testing. We have also discussed treatment options including airway pressurization, reconstructive otolaryngologic surgery, dental appliances and weight management.       Subsequently,treatment options will be discussed based on the diagnostic study. Meanwhile, He is urged to avoid supine sleep, weight gain and alcoholic beverages since all of these can worsen TOMER. He is cautioned against drowsy driving. If He feels sleepy while driving, He must pull over for a break/nap, rather than persist on the road, in the interest of He own safety and that of others on the road.    Plan  -  He  will be scheduled for an overnight PSG to assess sleep related  breathing disorder.    2.   Dream enactment behavior: Differential diagnosis includes REM sleep behavior disorder (RBD) vs pseudo-RBD due to underlying sleep disordered breathing. Pt was informed about the association of RBD with evolution of movement d/o like Parkinsons Disease and neurodegenerative disease.Denies known FMHx of parkinson.     In the meantime, pt. is recommended to take bed-side safety precautions such as remove hard furniture or sharp objects from the bedside. Carpet the floor. Lower the level of the bed. Use padded side rails to prevent fall. Bed partner to use separate bed until symptoms resolve, or, at least, use a body pillow between the pt and the bed-partner to prevent injury.     Plan:   -Suspicion of REM behavior disorder is low especially in light of obstructive sleep apnea.  Therefore we will hold off  on an in lab PSG with extended EMG.       3.  Chronic Insomnia: The importance of cognitive restructuring and behavior modification therapy is emphasized for long-term improvement rather than the use of hypnotic agents, which may offer short term relief but may lead to the development of tolerance and side effects with prolonged use. Evening exercise, wind-down  before bedtime, and stimulus control (leaving the bed when unable to fall back asleep at night) are explained.      Plan   -Handouts on stimulus control and sleep hygiene are given and a couple of titles of books on insomnia are recommended, written by behavioral psychologists.    -We will reassess after the treatment of TOEMR    Regarding treatment of other past medical problems and general health maintenance,  He is urged to follow up with PCP.

## 2021-08-28 DIAGNOSIS — E78.1 HYPERTRIGLYCERIDEMIA: ICD-10-CM

## 2021-08-28 DIAGNOSIS — Z76.0 ENCOUNTER FOR MEDICATION REFILL: ICD-10-CM

## 2021-08-31 RX ORDER — FENOFIBRATE 145 MG/1
TABLET, COATED ORAL
Qty: 90 TABLET | Refills: 0 | Status: SHIPPED | OUTPATIENT
Start: 2021-08-31 | End: 2022-01-14

## 2021-09-02 ENCOUNTER — HOME STUDY (OUTPATIENT)
Dept: SLEEP MEDICINE | Facility: MEDICAL CENTER | Age: 41
End: 2021-09-02
Attending: FAMILY MEDICINE
Payer: COMMERCIAL

## 2021-09-02 DIAGNOSIS — G47.30 SLEEP DISORDER BREATHING: ICD-10-CM

## 2021-09-02 DIAGNOSIS — G47.52 DREAM ENACTMENT BEHAVIOR: ICD-10-CM

## 2021-09-02 DIAGNOSIS — F51.04 CHRONIC INSOMNIA: ICD-10-CM

## 2021-09-06 PROCEDURE — 95806 SLEEP STUDY UNATT&RESP EFFT: CPT | Performed by: FAMILY MEDICINE

## 2021-09-07 NOTE — PROCEDURES
DIAGNOSTIC HOME SLEEP TEST (HST) REPORT       PATIENT ID:  NAME:  Braden Hill  MRN:               [id number]  YOB: 1980  DATE OF STUDY: 9/2/2021      Impression:     This study shows evidence of:     1.Severe obstructive sleep apnea with  Respiratory Event Index (CLAUDINE) of 68 per hour and worse in supine sleep with CLAUDINE at base0. These findings are based on the recording time (flow evaluation time).     2.  O2 Sat. ryan was 73%, avg O2 was 9 over1 % and baseline O2 at 98 %. O2 sat was below 89% for 70 min of the flow evaluation time. Avg HR 67 BPM.      TECHNICAL DESCRIPTION:  ResMed Device used was a type-III home study device. Home sleep study recording included: Airflow recording by nasal pressure transducer; Respiratory Effort by abdominal Respiratory Bands; O2 by finger oximetry. A position sensor and a snore channel was also used.    Scoring Criteria: A modification of the the AASM Manual for the Scoring of Sleep and Associated Events. Obstructive apnea was scored by cessation of airflow for at least 10 seconds with continuing respiratory effort.  Central apnea was scored by cessation of airflow for at least 10 seconds with no effort.  Hypopnea was scored by a 30% or more reduction in airflow for at least 10 seconds accompanied by an arterial oxygen desaturation of 3% or more.  (For Medicare patients, hypopneas were scored by a 30% or more reduction in airflow for at least 10 seconds accompanied by an arterial oxygen saturation of 4% or more, as required by their insurance, CMS.        General sleep summary: . Total recording time is 389 minutes and total flow evaluation time is 386 minutes. The patient spent 1.2 minutes in the supine position.    Respiratory events:    Apneas: 146 (Obstructive apnea index 22.2/hr, Central apnea index 0.5/hr, mixed 0 /hour)  Hypopneas: 292    Recommendations:    1. CPAP titration study vs Auto CPAP trial .   2.   In general patients with sleep apnea  are advised to avoid alcohol and sedatives and to not operate a motor vehicle while drowsy. In some cases alternative treatment options may prove effective in resolving sleep apnea in these options include upper airway surgery, the use of a dental orthotic or weight loss and positional therapy. Clinical correlation is required.         Travis Nation MD

## 2021-09-28 ENCOUNTER — SLEEP CENTER VISIT (OUTPATIENT)
Dept: SLEEP MEDICINE | Facility: MEDICAL CENTER | Age: 41
End: 2021-09-28
Payer: COMMERCIAL

## 2021-09-28 VITALS
WEIGHT: 227 LBS | BODY MASS INDEX: 32.5 KG/M2 | DIASTOLIC BLOOD PRESSURE: 72 MMHG | SYSTOLIC BLOOD PRESSURE: 128 MMHG | HEART RATE: 85 BPM | HEIGHT: 70 IN | OXYGEN SATURATION: 97 %

## 2021-09-28 DIAGNOSIS — R09.81 CHRONIC NASAL CONGESTION: ICD-10-CM

## 2021-09-28 DIAGNOSIS — J30.2 SEASONAL ALLERGIES: ICD-10-CM

## 2021-09-28 DIAGNOSIS — F51.04 CHRONIC INSOMNIA: ICD-10-CM

## 2021-09-28 DIAGNOSIS — G47.33 OSA (OBSTRUCTIVE SLEEP APNEA): ICD-10-CM

## 2021-09-28 PROCEDURE — 99214 OFFICE O/P EST MOD 30 MIN: CPT | Performed by: NURSE PRACTITIONER

## 2021-09-28 ASSESSMENT — FIBROSIS 4 INDEX: FIB4 SCORE: 0.59

## 2021-09-28 NOTE — PROGRESS NOTES
"Chief Complaint   Patient presents with   • Follow-Up     TOMER-Last seen 08/03/2021   • Results     HST- 09/02/2021       HPI:      Mr. Hill is a 40 y/o male patient who is in today for TOMER f/u and sleep study results.  Patient lives in [facility].  PMH includes GERD, disordered sleep, palpitations, hypertriglyceridemia, obesity, TOMER, depression, chronic insomnia, seasonal allergies, chronic nasal congestion, former smoker.     Patient is accompanied by his wife.  He is currently using a non prescribed CPAP that was given to him by his mother for least a year.   He is not sure what the CPAP pressures are set at.  Patient may consider purchasing the CPAP out-of-pocket and will inquire with his insurance about the co-pay.  He goes to bed between 9-11 pm and wakes up at 5 am.  On the weekends he does sleep until about 8 or 9 AM.  He reports occasional morning headache and does report snoring.  He usually has trouble falling asleep and can take him up to 2 to 2-1/2 hours.  He attributes this issue primarily to having trouble turning off his brain.  He tried zaleplon which was prescribed by his PCP but did not work.  ZzzQuil over-the-counter is effective but he does not like how it makes him feel in the morning.  Patient is fatigued and tired during the day.  He does report fogginess and forgetfulness as well as \"zoning out\" during conversation.  He cut out alcohol about 30 days ago and has joined the gym where he works out for least an hour and a half on his days off.  While at work he can walk up to 5 to 6 miles.  Patient also suffers with seasonal allergies and chronic nasal congestion for which she has not yet followed up with an ENT or an allergy specialist.  He did review this with his PCP in the past.  He uses over-the-counter Mucinex and nasal sprays.  He does often have trouble breathing through his nose due to this chronic congestion.  He is interested in a consult with an ENT specialist.      Sleep " Study History:   HSS from 9/2/21 indicated severe obstructive sleep apnea with  Respiratory Event Index (CLAUDINE) of 68 per hour and worse in supine sleep with CLAUDINE at base 0. Apneas: 146 (Obstructive apnea index 22.2/hr, Central apnea index 0.5/hr, mixed 0 /hour), Hypopneas: 292. O2 Sat. ryan was 73%, avg O2 was 9 over1 % and baseline O2 at 98 %. O2 sat was below 89% for 70 min of the flow evaluation time. Avg HR 67 BPM.     ROS:    Constitutional: Denies fevers, Denies weight changes  Eyes: Denies changes in vision, no eye pain  Ears/Nose/Throat/Mouth: Denies nasal congestion or sore throat   Cardiovascular: Denies chest pain or palpitations   Respiratory: Denies shortness of breath , Denies cough  Gastrointestinal/Hepatic: Denies abdominal pain, nausea, vomiting,   Skin/Breast: Denies rash,   Neurological: + headache, denies confusion,   Psychiatric: denies mood disorder   Sleep: + snoring       Past Medical History:   Diagnosis Date   • Has stopped breathing 9/21/2013    At night, fatigued all the time   • Heart burn    • High cholesterol    • Hypertriglyceridemia 9/21/2013   • Indigestion    • Sleep apnea     no cpap   • Snoring        Past Surgical History:   Procedure Laterality Date   • INGUINAL HERNIA REPAIR ROBOTIC XI Right 2/22/2021    Procedure: REPAIR, HERNIA, INGUINAL, ROBOT-ASSISTED, USING DA GONZALO XI;  Surgeon: Ian Latham M.D.;  Location: SURGERY [facility];  Service: Gen Robotic   • REPAIR, HERNIA, UMBILICAL, ROBOT-ASSISTED, USING DA TAYE  2/22/2021    Procedure: REPAIR,HERNIA,UMBILICAL,ROBOT-ASSISTED,USING DA GONZALO XI;  Surgeon: Ian Latham M.D.;  Location: SURGERY [facility];  Service: Gen Robotic       Family History   Problem Relation Age of Onset   • Hypertension Father    • Cancer Paternal Aunt         lung   • Heart Disease Neg Hx    • Diabetes Neg Hx    • Stroke Neg Hx    • Hyperlipidemia Neg Hx        Social History     Socioeconomic History   • Marital status:      Spouse  name: Not on file   • Number of children: Not on file   • Years of education: Not on file   • Highest education level: Not on file   Occupational History   • Not on file   Tobacco Use   • Smoking status: Former Smoker     Packs/day: 0.50     Years: 20.00     Pack years: 10.00     Types: Cigars, Cigarettes     Start date: 2/28/2018   • Smokeless tobacco: Former User     Quit date: 1/1/2017   • Tobacco comment: 1 can qod as of 2/21 uses nicotine pouches   Vaping Use   • Vaping Use: Former   Substance and Sexual Activity   • Alcohol use: Yes     Comment: 6 pack per week   • Drug use: Yes     Frequency: 7.0 times per week     Types: Marijuana, Inhaled     Comment: vape marijuana- denies h/o heroin, cocaine; h/o meth x 4, smoke, quit around 2000.   • Sexual activity: Yes     Partners: Female     Comment:  / Wife    Other Topics Concern   • Not on file   Social History Narrative   • Not on file     Social Determinants of Health     Financial Resource Strain:    • Difficulty of Paying Living Expenses:    Food Insecurity:    • Worried About Running Out of Food in the Last Year:    • Ran Out of Food in the Last Year:    Transportation Needs:    • Lack of Transportation (Medical):    • Lack of Transportation (Non-Medical):    Physical Activity:    • Days of Exercise per Week:    • Minutes of Exercise per Session:    Stress:    • Feeling of Stress :    Social Connections:    • Frequency of Communication with Friends and Family:    • Frequency of Social Gatherings with Friends and Family:    • Attends [employer] Services:    • Active Member of Clubs or Organizations:    • Attends Club or Organization Meetings:    • Marital Status:    Intimate Partner Violence:    • Fear of Current or Ex-Partner:    • Emotionally Abused:    • Physically Abused:    • Sexually Abused:        Allergies as of 09/28/2021   • (No Known Allergies)        Vitals:  Vitals:    09/28/21 0849   BP: 128/72   Pulse: 85   SpO2: 97%       Current  medications as of today   Current Outpatient Medications   Medication Sig Dispense Refill   • zaleplon (SONATA) 5 MG capsule Take 1 Capsule by mouth at bedtime for 30 days. 30 Capsule 0   • fenofibrate (TRICOR) 145 MG Tab TAKE 1 TABLET BY MOUTH EVERY DAY 90 Tablet 0   • atorvastatin (LIPITOR) 20 MG Tab Take 1 tablet by mouth every day. 90 tablet 3   • methylPREDNISolone (MEDROL DOSEPAK) 4 MG Tablet Therapy Pack Follow schedule on package instructions. (Patient not taking: Reported on 8/3/2021) 21 tablet 0     No current facility-administered medications for this visit.         Physical Exam: Limited by COVID-19 precautions.  Appearance: Well developed, well nourished, no acute distress  Eyes: PERRL, EOM intact, sclera white, conjunctiva moist  Ears: no lesions or deformities  Hearing: grossly intact  Nose: no lesions or deformities  Respiratory effort: no intercostal retractions or use of accessory muscles  Extremities: no cyanosis or edema  Abdomen: soft  Gait and Station: normal  Digits and nails: no clubbing, cyanosis, petechiae or nodes.  Cranial nerves: grossly intact  Skin: no visible rashes, lesions or ulcers noted  Orientation: Oriented to time, person and place  Mood and affect: mood and affect appropriate, normal interaction with examiner  Judgement: Intact    Assessment:  1. TOMER (obstructive sleep apnea)  DME CPAP   2. BMI 32.0-32.9,adult     3. Chronic insomnia     4. Chronic nasal congestion  REFERRAL TO ENT   5. Seasonal allergies  REFERRAL TO ENT         Plan  Discussed the cardiovascular and neuropsychiatric risks of untreated TOMER; including but not limited to: HTN, DM, MI, ASCVD, CVA, CHF, traffic accidents.     1.  HSS from 9/2/21 indicated severe obstructive sleep apnea with  Respiratory Event Index (CLAUDINE) of 68 per hour and worse in supine sleep with CLAUDINE at base 0. Apneas: 146 (Obstructive apnea index 22.2/hr, Central apnea index 0.5/hr, mixed 0 /hour), Hypopneas: 292. O2 Sat. ryan was 73%, avg  O2 was 9 over1 % and baseline O2 at 98 %. O2 sat was below 89% for 70 min of the flow evaluation time. Avg HR 67 BPM.   Recommendation:  CPAP titration study vs Auto CPAP trial.  In general patients with sleep apnea are advised to avoid alcohol and sedatives and to not operate a motor vehicle while drowsy. In some cases alternative treatment options may prove effective in resolving sleep apnea in these options include upper airway surgery, the use of a dental orthotic or weight loss and positional therapy.  These were reviewed with the patient today.  Patient is also advised to avoid the supine position, alcohol 4 hours prior to bedtime, sedatives and opiates as all can exacerbate apnea.    *Patient is amenable to CPAP trial.  DME order ([facility]) for ResMed autoCPAP 5-15 cmH2O,  mask (MOC) and supplies was provided today. Clean mask and supplies weekly with soap and water, and change supplies per insurance guidelines. Advised patient to use the CPAP every night for more than four hours for optimal health benefit and to meet the health insurance 70% compliance guideline.      2.  Encouraged a healthy diet and exercise to help with weight loss and management.  Patient has a weight goal of at least 200 or 205 pounds.  Will consider repeating sleep study in the future to reassess apnea.    3. Sleep hygiene discussed. Recommend keeping a set sleep/wake schedule. Logging enough hours of sleep. Limiting/Avoiding naps. No caffeine after noon and no heavy meals in the evening.   Chronic insomnia: Advised patient to try REM fresh over-the-counter to help with sleep. Sonata (Zaleplon) was ineffective. The importance of cognitive restructuring and behavior modification therapy is emphasized for long-term improvement rather than the use of hypnotic agents, which may offer short term relief but may lead to the development of tolerance and side effects with prolonged use.   We will continue to monitor as symptoms should improve with  better management of apnea however the patient may require referral for cognitive behavioral therapy to Dr. Haider as his insomnia is at times  related to him having difficulty with shutting off his brain.     4-5. Referral to Dr. Davidson, ENT was placed today to further evaluate.  Advised patient he may also try over-the-counter Flonase.  Continue using Mucinex.  6. Follow up with the appropriate healthcare practitioners for all other medical problems.  7. F/u in 4 months for first compliance, TOMER.       Dayami Ledbetter A.P.R.IMER.      This dictation was created using voice recognition software. The accuracy of the dictation is limited to the abilities of the software. I expect there may be some errors of grammar and possibly content.

## 2021-09-28 NOTE — PATIENT INSTRUCTIONS
Sleep Apnea  Sleep apnea is a condition in which breathing pauses or becomes shallow during sleep. Episodes of sleep apnea usually last 10 seconds or longer, and they may occur as many as 20 times an hour. Sleep apnea disrupts your sleep and keeps your body from getting the rest that it needs. This condition can increase your risk of certain health problems, including:  · Heart attack.  · Stroke.  · Obesity.  · Diabetes.  · Heart failure.  · Irregular heartbeat.  What are the causes?  There are three kinds of sleep apnea:  · Obstructive sleep apnea. This kind is caused by a blocked or collapsed airway.  · Central sleep apnea. This kind happens when the part of the brain that controls breathing does not send the correct signals to the muscles that control breathing.  · Mixed sleep apnea. This is a combination of obstructive and central sleep apnea.  The most common cause of this condition is a collapsed or blocked airway. An airway can collapse or become blocked if:  · Your throat muscles are abnormally relaxed.  · Your tongue and tonsils are larger than normal.  · You are overweight.  · Your airway is smaller than normal.  What increases the risk?  You are more likely to develop this condition if you:  · Are overweight.  · Smoke.  · Have a smaller than normal airway.  · Are elderly.  · Are male.  · Drink alcohol.  · Take sedatives or tranquilizers.  · Have a family history of sleep apnea.  What are the signs or symptoms?  Symptoms of this condition include:  · Trouble staying asleep.  · Daytime sleepiness and tiredness.  · Irritability.  · Loud snoring.  · Morning headaches.  · Trouble concentrating.  · Forgetfulness.  · Decreased interest in sex.  · Unexplained sleepiness.  · Mood swings.  · Personality changes.  · Feelings of depression.  · Waking up often during the night to urinate.  · Dry mouth.  · Sore throat.  How is this diagnosed?  This condition may be diagnosed with:  · A medical history.  · A physical  exam.  · A series of tests that are done while you are sleeping (sleep study). These tests are usually done in a sleep lab, but they may also be done at home.  How is this treated?  Treatment for this condition aims to restore normal breathing and to ease symptoms during sleep. It may involve managing health issues that can affect breathing, such as high blood pressure or obesity. Treatment may include:  · Sleeping on your side.  · Using a decongestant if you have nasal congestion.  · Avoiding the use of depressants, including alcohol, sedatives, and narcotics.  · Losing weight if you are overweight.  · Making changes to your diet.  · Quitting smoking.  · Using a device to open your airway while you sleep, such as:  ? An oral appliance. This is a custom-made mouthpiece that shifts your lower jaw forward.  ? A continuous positive airway pressure (CPAP) device. This device blows air through a mask when you breathe out (exhale).  ? A nasal expiratory positive airway pressure (EPAP) device. This device has valves that you put into each nostril.  ? A bi-level positive airway pressure (BPAP) device. This device blows air through a mask when you breathe in (inhale) and breathe out (exhale).  · Having surgery if other treatments do not work. During surgery, excess tissue is removed to create a wider airway.  It is important to get treatment for sleep apnea. Without treatment, this condition can lead to:  · High blood pressure.  · Coronary artery disease.  · In men, an inability to achieve or maintain an erection (impotence).  · Reduced thinking abilities.  Follow these instructions at home:  Lifestyle  · Make any lifestyle changes that your health care provider recommends.  · Eat a healthy, well-balanced diet.  · Take steps to lose weight if you are overweight.  · Avoid using depressants, including alcohol, sedatives, and narcotics.  · Do not use any products that contain nicotine or tobacco, such as cigarettes,  "e-cigarettes, and chewing tobacco. If you need help quitting, ask your health care provider.  General instructions  · Take over-the-counter and prescription medicines only as told by your health care provider.  · If you were given a device to open your airway while you sleep, use it only as told by your health care provider.  · If you are having surgery, make sure to tell your health care provider you have sleep apnea. You may need to bring your device with you.  · Keep all follow-up visits as told by your health care provider. This is important.  Contact a health care provider if:  · The device that you received to open your airway during sleep is uncomfortable or does not seem to be working.  · Your symptoms do not improve.  · Your symptoms get worse.  Get help right away if:  · You develop:  ? Chest pain.  ? Shortness of breath.  ? Discomfort in your back, arms, or stomach.  · You have:  ? Trouble speaking.  ? Weakness on one side of your body.  ? Drooping in your face.  These symptoms may represent a serious problem that is an emergency. Do not wait to see if the symptoms will go away. Get medical help right away. Call your local emergency services (911 in the [place] Do not drive yourself to the hospital.  Summary  · Sleep apnea is a condition in which breathing pauses or becomes shallow during sleep.  · The most common cause is a collapsed or blocked airway.  · The goal of treatment is to restore normal breathing and to ease symptoms during sleep.  This information is not intended to replace advice given to you by your health care provider. Make sure you discuss any questions you have with your health care provider.  Document Released: 12/08/2003 Document Revised: 10/04/2019 Document Reviewed: 08/13/2019  Elsevier Patient Education © 2020 [employer]        CPAP and BPAP Information  CPAP and BPAP are methods of helping a person breathe with the use of air pressure. CPAP stands for \"continuous positive airway " "pressure.\" BPAP stands for \"bi-level positive airway pressure.\" In both methods, air is blown through your nose or mouth and into your air passages to help you breathe well.  CPAP and BPAP use different amounts of pressure to blow air. With CPAP, the amount of pressure stays the same while you breathe in and out. With BPAP, the amount of pressure is increased when you breathe in (inhale) so that you can take larger breaths. Your health care provider will recommend whether CPAP or BPAP would be more helpful for you.  Why are CPAP and BPAP treatments used?  CPAP or BPAP can be helpful if you have:  · Sleep apnea.  · Chronic obstructive pulmonary disease (COPD).  · Heart failure.  · Medical conditions that weaken the muscles of the chest including muscular dystrophy, or neurological diseases such as amyotrophic lateral sclerosis (ALS).  · Other problems that cause breathing to be weak, abnormal, or difficult.  CPAP is most commonly used for obstructive sleep apnea (TOMER) to keep the airways from collapsing when the muscles relax during sleep.  How is CPAP or BPAP administered?  Both CPAP and BPAP are provided by a small machine with a flexible plastic tube that attaches to a plastic mask. You wear the mask. Air is blown through the mask into your nose or mouth. The amount of pressure that is used to blow the air can be adjusted on the machine. Your health care provider will determine the pressure setting that should be used based on your individual needs.  When should CPAP or BPAP be used?  In most cases, the mask only needs to be worn during sleep. Generally, the mask needs to be worn throughout the night and during any daytime naps. People with certain medical conditions may also need to wear the mask at other times when they are awake. Follow instructions from your health care provider about when to use the machine.  What are some tips for using the mask?    · Because the mask needs to be snug, some people feel " trapped or closed-in (claustrophobic) when first using the mask. If you feel this way, you may need to get used to the mask. One way to do this is by holding the mask loosely over your nose or mouth and then gradually applying the mask more snugly. You can also gradually increase the amount of time that you use the mask.  · Masks are available in various types and sizes. Some fit over your mouth and nose while others fit over just your nose. If your mask does not fit well, talk with your health care provider about getting a different one.  · If you are using a mask that fits over your nose and you tend to breathe through your mouth, a chin strap may be applied to help keep your mouth closed.  · The CPAP and BPAP machines have alarms that may sound if the mask comes off or develops a leak.  · If you have trouble with the mask, it is very important that you talk with your health care provider about finding a way to make the mask easier to tolerate. Do not stop using the mask. Stopping the use of the mask could have a negative impact on your health.  What are some tips for using the machine?  · Place your CPAP or BPAP machine on a secure table or stand near an electrical outlet.  · Know where the on/off switch is located on the machine.  · Follow instructions from your health care provider about how to set the pressure on your machine and when you should use it.  · Do not eat or drink while the CPAP or BPAP machine is on. Food or fluids could get pushed into your lungs by the pressure of the CPAP or BPAP.  · Do not smoke. Tobacco smoke residue can damage the machine.  · For home use, CPAP and BPAP machines can be rented or purchased through home health care companies. Many different brands of machines are available. Renting a machine before purchasing may help you find out which particular machine works well for you.  · Keep the CPAP or BPAP machine and attachments clean. Ask your health care provider for specific  instructions.  Get help right away if:  · You have redness or open areas around your nose or mouth where the mask fits.  · You have trouble using the CPAP or BPAP machine.  · You cannot tolerate wearing the CPAP or BPAP mask.  · You have pain, discomfort, and bloating in your abdomen.  Summary  · CPAP and BPAP are methods of helping a person breathe with the use of air pressure.  · Both CPAP and BPAP are provided by a small machine with a flexible plastic tube that attaches to a plastic mask.  · If you have trouble with the mask, it is very important that you talk with your health care provider about finding a way to make the mask easier to tolerate.  This information is not intended to replace advice given to you by your health care provider. Make sure you discuss any questions you have with your health care provider.  Document Released: 09/15/2005 Document Revised: 04/08/2020 Document Reviewed: 11/06/2017  [employer] Patient Education © 2020 [employer]        Uvulopalatopharyngoplasty  Uvulopalatopharyngoplasty is a surgery to remove or reshape soft tissues in the back of the throat, such as:  · The tonsils.  · The small piece of tissue that hangs in the back of the throat (uvula).  · Soft tissue at the back of the roof of the mouth (soft palate).  This surgery may be done to treat severe snoring or sleep apnea that is caused by throat blockage related to too much tissue.  Tell a health care provider about:  · Any allergies you have.  · All medicines you are taking, including vitamins, herbs, eye drops, creams, and over-the-counter medicines.  · Any problems you or family members have had with anesthetic medicines.  · Any blood disorders you have.  · Any surgeries you have had.  · Any medical conditions you have.  · Whether you are pregnant or may be pregnant.  What are the risks?  Generally, this is a safe procedure. However, problems may occur, including:  · Infection.  · Bleeding.  · Allergic reactions to  medicines.  · Damage to other structures or organs.  · Blood clots.  · Changes in your voice or sense of smell.  · Difficulty swallowing.  · Having fluids come out of your nose when swallowing.  · Return of apnea symptoms.  What happens before the procedure?  Medicines  Ask your health care provider about:  · Changing or stopping your regular medicines. This is especially important if you are taking diabetes medicines or blood thinners.  · Taking medicines such as aspirin and ibuprofen. These medicines can thin your blood. Do not take these medicines unless your health care provider tells you to take them.  · Taking over-the-counter medicines, vitamins, herbs, and supplements.  Eating and drinking  Follow instructions from your health care provider about eating and drinking, which may include:  · 8 hours before the procedure - stop eating heavy meals or foods, such as meat, fried foods, or fatty foods.  · 6 hours before the procedure - stop eating light meals or foods, such as toast or cereal.  · 6 hours before the procedure - stop drinking milk or drinks that contain milk.  · 2 hours before the procedure - stop drinking clear liquids.  Staying hydrated  Follow instructions from your health care provider about hydration, which may include:  · Up to 2 hours before the procedure - you may continue to drink clear liquids, such as water, clear fruit juice, black coffee, and plain tea.  Exams and tests  · You will have an exam to evaluate your palate and throat.  · You may be tested for sleep apnea or to determine the severity of your sleep apnea.  General instructions  · Your health care provider may start you on a weight reduction program.  · Do not use any products that contain nicotine or tobacco for at least 4 weeks before the procedure. These products include cigarettes, e-cigarettes, and chewing tobacco. If you need help quitting, ask your health care provider.  · If you use a continuous positive airway pressure  (CPAP) device, follow your health care provider's instructions about how often to use it before surgery. Bring your CPAP device to the hospital on the day of your surgery.  · Plan to have someone take you home from the hospital or clinic.  · If you will be going home right after the procedure, plan to have someone with you for 24 hours.  · Ask your health care provider what steps will be taken to help prevent infection.  What happens during the procedure?  · An IV will be inserted into one of your veins.  · You will be given:  ? A medicine to help you relax (sedative).  ? A medicine to make you fall asleep (general anesthetic).  · Small incisions will be made in the soft tissue at the back of your throat.  · Throat tissue that blocks your airway will be removed.  · The incisions in your throat will be closed with stitches (sutures). These will absorb in the next few days or weeks.  The procedure may vary among health care providers and hospitals.  What happens after the procedure?  · Your blood pressure, heart rate, breathing rate, and blood oxygen level will be monitored until you leave the hospital or clinic.  · You may continue to receive fluids and medicine through an IV.  · You will have some pain, especially when swallowing or talking. Pain medicines will be available to help you.  · If you use a CPAP device, it may be placed on you after surgery.  · The head of your bed will be kept at an upright angle.  · Do not drive for 24 hours if you were given a sedative during the procedure.  Summary  · Uvulopalatopharyngoplasty is a surgery to remove soft tissue from the back of the throat.  · This surgery may be done to treat sleep apnea by removing tissue that is causing you to have difficulty breathing when you sleep.  · Follow instructions from your health care provider about taking medicines and about eating and drinking before the procedure.  · If you use a CPAP device, follow your health care provider's  instructions about how often to use it before surgery. Bring your CPAP device to the hospital on the day of your surgery.  This information is not intended to replace advice given to you by your health care provider. Make sure you discuss any questions you have with your health care provider.  Document Released: 01/20/2012 Document Revised: 10/04/2019 Document Reviewed: 09/26/2019  [employer] Patient Education © 2020 [employer]      Insomnia  Insomnia is a sleep disorder that makes it difficult to fall asleep or stay asleep. Insomnia can cause fatigue, low energy, difficulty concentrating, mood swings, and poor performance at work or school.  There are three different ways to classify insomnia:  · Difficulty falling asleep.  · Difficulty staying asleep.  · Waking up too early in the morning.  Any type of insomnia can be long-term (chronic) or short-term (acute). Both are common. Short-term insomnia usually lasts for three months or less. Chronic insomnia occurs at least three times a week for longer than three months.  What are the causes?  Insomnia may be caused by another condition, situation, or substance, such as:  · Anxiety.  · Certain medicines.  · Gastroesophageal reflux disease (GERD) or other gastrointestinal conditions.  · Asthma or other breathing conditions.  · Restless legs syndrome, sleep apnea, or other sleep disorders.  · Chronic pain.  · Menopause.  · Stroke.  · Abuse of alcohol, tobacco, or illegal drugs.  · Mental health conditions, such as depression.  · Caffeine.  · Neurological disorders, such as Alzheimer's disease.  · An overactive thyroid (hyperthyroidism).  Sometimes, the cause of insomnia may not be known.  What increases the risk?  Risk factors for insomnia include:  · Gender. Women are affected more often than men.  · Age. Insomnia is more common as you get older.  · Stress.  · Lack of exercise.  · Irregular work schedule or working night shifts.  · Traveling between different time  zones.  · Certain medical and mental health conditions.  What are the signs or symptoms?  If you have insomnia, the main symptom is having trouble falling asleep or having trouble staying asleep. This may lead to other symptoms, such as:  · Feeling fatigued or having low energy.  · Feeling nervous about going to sleep.  · Not feeling rested in the morning.  · Having trouble concentrating.  · Feeling irritable, anxious, or depressed.  How is this diagnosed?  This condition may be diagnosed based on:  · Your symptoms and medical history. Your health care provider may ask about:  ? Your sleep habits.  ? Any medical conditions you have.  ? Your mental health.  · A physical exam.  How is this treated?  Treatment for insomnia depends on the cause. Treatment may focus on treating an underlying condition that is causing insomnia. Treatment may also include:  · Medicines to help you sleep.  · Counseling or therapy.  · Lifestyle adjustments to help you sleep better.  Follow these instructions at home:  Eating and drinking    · Limit or avoid alcohol, caffeinated beverages, and cigarettes, especially close to bedtime. These can disrupt your sleep.  · Do not eat a large meal or eat spicy foods right before bedtime. This can lead to digestive discomfort that can make it hard for you to sleep.  Sleep habits    · Keep a sleep diary to help you and your health care provider figure out what could be causing your insomnia. Write down:  ? When you sleep.  ? When you wake up during the night.  ? How well you sleep.  ? How rested you feel the next day.  ? Any side effects of medicines you are taking.  ? What you eat and drink.  · Make your bedroom a dark, comfortable place where it is easy to fall asleep.  ? Put up shades or blackout curtains to block light from outside.  ? Use a white noise machine to block noise.  ? Keep the temperature cool.  · Limit screen use before bedtime. This includes:  ? Watching TV.  ? Using your smartphone,  tablet, or computer.  · Stick to a routine that includes going to bed and waking up at the same times every day and night. This can help you fall asleep faster. Consider making a quiet activity, such as reading, part of your nighttime routine.  · Try to avoid taking naps during the day so that you sleep better at night.  · Get out of bed if you are still awake after 15 minutes of trying to sleep. Keep the lights down, but try reading or doing a quiet activity. When you feel sleepy, go back to bed.  General instructions  · Take over-the-counter and prescription medicines only as told by your health care provider.  · Exercise regularly, as told by your health care provider. Avoid exercise starting several hours before bedtime.  · Use relaxation techniques to manage stress. Ask your health care provider to suggest some techniques that may work well for you. These may include:  ? Breathing exercises.  ? Routines to release muscle tension.  ? Visualizing peaceful scenes.  · Make sure that you drive carefully. Avoid driving if you feel very sleepy.  · Keep all follow-up visits as told by your health care provider. This is important.  Contact a health care provider if:  · You are tired throughout the day.  · You have trouble in your daily routine due to sleepiness.  · You continue to have sleep problems, or your sleep problems get worse.  Get help right away if:  · You have serious thoughts about hurting yourself or someone else.  If you ever feel like you may hurt yourself or others, or have thoughts about taking your own life, get help right away. You can go to your nearest emergency department or call:  · Your local emergency services (911 in the [place]  · A suicide crisis helpline, such as the National Suicide Prevention Lifeline at 1-872.714.2521. This is open 24 hours a day.  Summary  · Insomnia is a sleep disorder that makes it difficult to fall asleep or stay asleep.  · Insomnia can be long-term (chronic) or  short-term (acute).  · Treatment for insomnia depends on the cause. Treatment may focus on treating an underlying condition that is causing insomnia.  · Keep a sleep diary to help you and your health care provider figure out what could be causing your insomnia.  This information is not intended to replace advice given to you by your health care provider. Make sure you discuss any questions you have with your health care provider.  Document Released: 12/15/2001 Document Revised: 11/30/2018 Document Reviewed: 09/27/2018  [employer]vier Patient Education © 2020 [employer]

## 2022-01-09 DIAGNOSIS — Z76.0 ENCOUNTER FOR MEDICATION REFILL: ICD-10-CM

## 2022-01-09 DIAGNOSIS — E78.1 HYPERTRIGLYCERIDEMIA: ICD-10-CM

## 2022-01-15 RX ORDER — FENOFIBRATE 145 MG/1
TABLET, COATED ORAL
Qty: 90 TABLET | Refills: 0 | Status: SHIPPED | OUTPATIENT
Start: 2022-01-15 | End: 2022-06-08

## 2022-06-08 ENCOUNTER — OFFICE VISIT (OUTPATIENT)
Dept: URGENT CARE | Facility: PHYSICIAN GROUP | Age: 42
End: 2022-06-08
Payer: COMMERCIAL

## 2022-06-08 VITALS
HEART RATE: 97 BPM | HEIGHT: 70 IN | WEIGHT: 231 LBS | DIASTOLIC BLOOD PRESSURE: 74 MMHG | TEMPERATURE: 97.5 F | BODY MASS INDEX: 33.07 KG/M2 | SYSTOLIC BLOOD PRESSURE: 114 MMHG | RESPIRATION RATE: 16 BRPM | OXYGEN SATURATION: 96 %

## 2022-06-08 DIAGNOSIS — R50.9 FEVER AND CHILLS: ICD-10-CM

## 2022-06-08 DIAGNOSIS — R05.9 COUGH: ICD-10-CM

## 2022-06-08 DIAGNOSIS — U07.1 COVID-19 VIRUS INFECTION: Primary | ICD-10-CM

## 2022-06-08 DIAGNOSIS — R52 GENERALIZED BODY ACHES: ICD-10-CM

## 2022-06-08 DIAGNOSIS — J02.9 SORE THROAT: ICD-10-CM

## 2022-06-08 LAB
EXTERNAL QUALITY CONTROL: ABNORMAL
SARS-COV+SARS-COV-2 AG RESP QL IA.RAPID: POSITIVE

## 2022-06-08 PROCEDURE — 99213 OFFICE O/P EST LOW 20 MIN: CPT | Mod: CS | Performed by: PHYSICIAN ASSISTANT

## 2022-06-08 PROCEDURE — 87426 SARSCOV CORONAVIRUS AG IA: CPT | Performed by: PHYSICIAN ASSISTANT

## 2022-06-08 RX ORDER — DEXAMETHASONE 6 MG/1
6 TABLET ORAL DAILY
Qty: 7 TABLET | Refills: 0 | Status: SHIPPED | OUTPATIENT
Start: 2022-06-08 | End: 2022-06-15

## 2022-06-08 RX ORDER — DEXTROMETHORPHAN HYDROBROMIDE AND PROMETHAZINE HYDROCHLORIDE 15; 6.25 MG/5ML; MG/5ML
5 SYRUP ORAL EVERY 4 HOURS PRN
Qty: 180 ML | Refills: 0 | Status: SHIPPED | OUTPATIENT
Start: 2022-06-08 | End: 2022-06-18

## 2022-06-08 RX ORDER — ALBUTEROL SULFATE 90 UG/1
2 AEROSOL, METERED RESPIRATORY (INHALATION) EVERY 6 HOURS PRN
Qty: 8.5 G | Refills: 0 | Status: SHIPPED | OUTPATIENT
Start: 2022-06-08 | End: 2022-09-08

## 2022-06-08 ASSESSMENT — ENCOUNTER SYMPTOMS
SORE THROAT: 1
FEVER: 1
MYALGIAS: 1
HEADACHES: 1
COUGH: 1

## 2022-06-08 ASSESSMENT — FIBROSIS 4 INDEX: FIB4 SCORE: 0.59

## 2022-06-08 NOTE — LETTER
June 8, 2022         Patient: Braden Hill   YOB: 1980   Date of Visit: 6/8/2022           To Whom it May Concern:    Braden Hill was seen in my clinic on 6/8/2022. He may return to work on 06/13/2022.    If you have any questions or concerns, please don't hesitate to call.        Sincerely,           Amita Cristina P.A.-C.  Electronically Signed

## 2022-06-08 NOTE — PROGRESS NOTES
Subjective     Braden Hill is a 41 y.o. male who presents with Cough, Pharyngitis, and Fever (Started x2 days, took home covid test yesterday came out positive)    Medications:    • This patient does not have an active medication from one of the medication groupers.    Allergies: Patient has no known allergies.    Problem List: Braden Hill does not have any pertinent problems on file.    Surgical History:  Past Surgical History:   Procedure Laterality Date   • INGUINAL HERNIA REPAIR ROBOTIC XI Right 2/22/2021    Procedure: REPAIR, HERNIA, INGUINAL, ROBOT-ASSISTED, USING DA GONZALO XI;  Surgeon: Ian Latham M.D.;  Location: SURGERY [facility];  Service: Gen Robotic   • REPAIR, HERNIA, UMBILICAL, ROBOT-ASSISTED, USING DA TAYE  2/22/2021    Procedure: REPAIR,HERNIA,UMBILICAL,ROBOT-ASSISTED,USING DA GONZALO XI;  Surgeon: Ian Latham M.D.;  Location: SURGERY [facility];  Service: Gen Robotic       Past Social Hx: Braden Hill  reports that he has quit smoking. His smoking use included cigars and cigarettes. He started smoking about 4 years ago. He has a 10.00 pack-year smoking history. He quit smokeless tobacco use about 5 years ago. He reports current alcohol use. He reports current drug use. Frequency: 7.00 times per week. Drugs: Marijuana and Inhaled.     Past Family Hx:  Braden Hill family history includes Cancer in his paternal aunt; Hypertension in his father.     Problem list, medications, and allergies reviewed by myself today in Epic.          Patient presents with cough, fever and sore throat that started 2 days ago. Pt states home covid test is positive. Pt needs official covid test for work, work note and would also like some cough medicine for his cough.  otc medications not really helping.         URI   This is a new problem. Episode onset: 2 days. The problem has been gradually worsening. The maximum temperature recorded prior to his arrival was 101 - 101.9 F.  "Associated symptoms include congestion, coughing, headaches and a sore throat. He has tried acetaminophen, increased fluids and NSAIDs for the symptoms. The treatment provided mild relief.       Review of Systems   Constitutional: Positive for fever and malaise/fatigue.   HENT: Positive for congestion and sore throat.    Respiratory: Positive for cough.    Musculoskeletal: Positive for myalgias.   Neurological: Positive for headaches.   All other systems reviewed and are negative.             Objective     /74   Pulse 97   Temp 36.4 °C (97.5 °F) (Temporal)   Resp 16   Ht 1.778 m (5' 10\")   Wt 105 kg (231 lb)   SpO2 96%   BMI 33.15 kg/m²      Physical Exam  Vitals and nursing note reviewed.   Constitutional:       General: He is not in acute distress.     Appearance: Normal appearance. He is well-developed and normal weight. He is not toxic-appearing.   HENT:      Head: Normocephalic and atraumatic.      Right Ear: Tympanic membrane normal.      Left Ear: Tympanic membrane normal.      Nose: Mucosal edema, congestion and rhinorrhea present.      Mouth/Throat:      Lips: Pink.      Mouth: Mucous membranes are moist.      Pharynx: Uvula midline. Posterior oropharyngeal erythema present. No uvula swelling.   Eyes:      Extraocular Movements: Extraocular movements intact.      Conjunctiva/sclera: Conjunctivae normal.      Pupils: Pupils are equal, round, and reactive to light.   Cardiovascular:      Rate and Rhythm: Normal rate and regular rhythm.      Pulses: Normal pulses.      Heart sounds: Normal heart sounds.   Pulmonary:      Effort: Pulmonary effort is normal.      Breath sounds: No decreased breath sounds.   Abdominal:      Palpations: Abdomen is soft.   Musculoskeletal:         General: Normal range of motion.      Cervical back: Normal range of motion and neck supple.   Lymphadenopathy:      Cervical: No cervical adenopathy.   Skin:     General: Skin is warm and dry.      Capillary Refill: " Capillary refill takes less than 2 seconds.   Neurological:      General: No focal deficit present.      Mental Status: He is alert and oriented to person, place, and time.      Gait: Gait normal.   Psychiatric:         Mood and Affect: Mood normal.         Behavior: Behavior is cooperative.                    Lab Results/POC Test Results   Results for orders placed or performed in visit on 06/08/22   POCT SARS-COV Antigen NADINE (Symptomatic only)   Result Value Ref Range    Internal  Valid     SARS-COV ANTIGEN NADINE Positive (A) Negative, Indeterminate, None Detected, Valid, Invalid, Pass                    Assessment & Plan                1. COVID-19 virus infection  POCT SARS-COV Antigen NADINE (Symptomatic only)    promethazine-dextromethorphan (PROMETHAZINE-DM) 6.25-15 MG/5ML syrup    albuterol 108 (90 Base) MCG/ACT Aero Soln inhalation aerosol    dexamethasone (DECADRON) 6 MG Tab   2. Cough  POCT SARS-COV Antigen NADINE (Symptomatic only)    promethazine-dextromethorphan (PROMETHAZINE-DM) 6.25-15 MG/5ML syrup    albuterol 108 (90 Base) MCG/ACT Aero Soln inhalation aerosol    dexamethasone (DECADRON) 6 MG Tab   3. Generalized body aches  POCT SARS-COV Antigen NADINE (Symptomatic only)    promethazine-dextromethorphan (PROMETHAZINE-DM) 6.25-15 MG/5ML syrup    albuterol 108 (90 Base) MCG/ACT Aero Soln inhalation aerosol    dexamethasone (DECADRON) 6 MG Tab   4. Sore throat  POCT SARS-COV Antigen NADINE (Symptomatic only)    promethazine-dextromethorphan (PROMETHAZINE-DM) 6.25-15 MG/5ML syrup    albuterol 108 (90 Base) MCG/ACT Aero Soln inhalation aerosol    dexamethasone (DECADRON) 6 MG Tab   5. Fever and chills  POCT SARS-COV Antigen NADINE (Symptomatic only)    promethazine-dextromethorphan (PROMETHAZINE-DM) 6.25-15 MG/5ML syrup    albuterol 108 (90 Base) MCG/ACT Aero Soln inhalation aerosol    dexamethasone (DECADRON) 6 MG Tab     Patient was evaluated in clinic today while wearing appropriate personal protective  equipment.      PT to begin prescription medications today as discussed for cough.     Discussed that this was viral in nature. Did not see any evidence of a bacterial process. Discussed natural progression and sx care.    PT can begin or continue OTC medications, increase fluids and rest until symptoms improve.     PT should follow up with PCP in 1-2 days for re-evaluation if symptoms have not improved.      Discussed red flags and reasons to return to UC or ED.      Pt and/or family verbalized understanding of diagnosis and follow up instructions and was offered informational handout on diagnosis.  PT discharged.

## 2022-09-08 ENCOUNTER — OFFICE VISIT (OUTPATIENT)
Dept: MEDICAL GROUP | Facility: CLINIC | Age: 42
End: 2022-09-08
Payer: COMMERCIAL

## 2022-09-08 VITALS
OXYGEN SATURATION: 97 % | HEART RATE: 83 BPM | RESPIRATION RATE: 20 BRPM | WEIGHT: 229.2 LBS | HEIGHT: 70 IN | BODY MASS INDEX: 32.81 KG/M2 | TEMPERATURE: 98.5 F

## 2022-09-08 DIAGNOSIS — R09.81 CHRONIC NASAL CONGESTION: ICD-10-CM

## 2022-09-08 DIAGNOSIS — E78.1 HYPERTRIGLYCERIDEMIA: ICD-10-CM

## 2022-09-08 DIAGNOSIS — F33.1 MODERATE EPISODE OF RECURRENT MAJOR DEPRESSIVE DISORDER (HCC): ICD-10-CM

## 2022-09-08 DIAGNOSIS — J32.4 CHRONIC PANSINUSITIS: ICD-10-CM

## 2022-09-08 DIAGNOSIS — E55.9 VITAMIN D DEFICIENCY: ICD-10-CM

## 2022-09-08 DIAGNOSIS — G47.00 INSOMNIA, UNSPECIFIED TYPE: ICD-10-CM

## 2022-09-08 PROCEDURE — 99214 OFFICE O/P EST MOD 30 MIN: CPT | Performed by: PHYSICIAN ASSISTANT

## 2022-09-08 RX ORDER — MONTELUKAST SODIUM 10 MG/1
10 TABLET ORAL DAILY
Qty: 30 TABLET | Refills: 1 | Status: SHIPPED | OUTPATIENT
Start: 2022-09-08 | End: 2022-11-15

## 2022-09-08 RX ORDER — FLUTICASONE PROPIONATE 50 MCG
1 SPRAY, SUSPENSION (ML) NASAL DAILY
COMMUNITY
End: 2024-03-16

## 2022-09-08 ASSESSMENT — FIBROSIS 4 INDEX: FIB4 SCORE: 0.6

## 2022-09-08 ASSESSMENT — PATIENT HEALTH QUESTIONNAIRE - PHQ9
SUM OF ALL RESPONSES TO PHQ QUESTIONS 1-9: 11
5. POOR APPETITE OR OVEREATING: 1 - SEVERAL DAYS
CLINICAL INTERPRETATION OF PHQ2 SCORE: 2

## 2022-09-08 NOTE — PROGRESS NOTES
"cc:  congestion    Subjective:     Braden Hill is a 42 y.o. male presenting for congestion      Patient presents to the office for congestion.  Patient states that he has been having a hard time sleeping as he cannot breathe.  He states that he does use a nasal spray.  He is using flonase.  He has also been using afrin for years.  He started due to congestion.  He states that he has had allergies. He also has a deviated septum on the right side.     Patient states that he is having a hard time falling asleep.  He is able to stay asleep but has difficulty falling asleep.     Patient did have a positive depression screen.  He feels that it is  more related to his sinuses and sleep and would like to focus on his congestion    Patient states that he was on a triglyceride medication.  Patient needed labs but did not go in to have the labs and the medication was not filled.     Review of systems:  See above.   Denies any symptoms unless previously indicated.        Current Outpatient Medications:     fluticasone (FLONASE) 50 MCG/ACT nasal spray, Administer 1 Spray into affected nostril(S) every day., Disp: , Rfl:     montelukast (SINGULAIR) 10 MG Tab, Take 1 Tablet by mouth every day., Disp: 30 Tablet, Rfl: 1    Allergies, past medical history, past surgical history, family history, social history reviewed and updated    Objective:     Vitals: Pulse 83   Temp 36.9 °C (98.5 °F) (Temporal)   Resp 20   Ht 1.778 m (5' 10\")   Wt 104 kg (229 lb 3.2 oz)   SpO2 97%   BMI 32.89 kg/m²   General: Alert, pleasant, NAD  EYES:   PERRL, EOMI, no icterus or pallor.  Conjunctivae and lids normal.   HENT:  Normocephalic.  External ears normal.  Neck supple.    Respiratory: Normal respiratory effort.    Abdomen: obese.  Skin: Warm, dry, no rashes.  Musculoskeletal: Gait is normal.  Moves all extremities well.    Extremities: normal range of motion all extremities.   Neurological: No tremors, sensation grossly intact,  CN2-12 " intact.  Psych:  Affect/mood is normal, judgement is good, memory is intact, grooming is appropriate.    Assessment/Plan:     Braden was seen today for establish care and sinus problem.    Diagnoses and all orders for this visit:    Chronic nasal congestion  -     montelukast (SINGULAIR) 10 MG Tab; Take 1 Tablet by mouth every day.  -     CT-LOCALIZATION LIMITED SINUSES; Future  -     Referral to ENT  -     Referral to Pulmonary and Sleep Medicine  Chronic pansinusitis  -     montelukast (SINGULAIR) 10 MG Tab; Take 1 Tablet by mouth every day.  -     CT-LOCALIZATION LIMITED SINUSES; Future  -     Referral to ENT  -     Referral to Pulmonary and Sleep Medicine    Will obtain a CT to evaluate further, start patient on Singulair, refer to ENT and also to pulmonary medicine.  I have also recommended saline lavage for patient.  He will look into this as well.  Plan is to follow-up in 3 to 6 weeks, sooner if needed.    Advised patient that it is imperative for him to stop the Afrin right away explaining concerns.  Patient has verbalized understanding.    Insomnia, unspecified type  -     Referral to Pulmonary and Sleep Medicine    Believe this may be related to patient's congestion.  However, we will obtain a sleep study to evaluate further.    Moderate episode of recurrent major depressive disorder (HCC)    Discussed with patient.  He feels that this is related to congestion and not being able to sleep.  We will not start medication at this time but discuss further if symptoms persist or worsen.    Hypertriglyceridemia  -     Lipid Profile; Future  -     Comp Metabolic Panel; Future  -     TSH WITH REFLEX TO FT4; Future  Labs of been ordered to evaluate further.  Vitamin D deficiency  -     VITAMIN D,25 HYDROXY (DEFICIENCY); Future      Follow-up in 3 to 6 weeks.    Return in about 6 weeks (around 10/20/2022), or if symptoms worsen or fail to improve, for 3-6 weeks.    Please note that this dictation was created using  voice recognition software. I have made every reasonable attempt to correct obvious errors, but expect that there are errors of grammar and possible content that I did not discover before finalizing note.

## 2022-09-23 ENCOUNTER — OFFICE VISIT (OUTPATIENT)
Dept: SLEEP MEDICINE | Facility: MEDICAL CENTER | Age: 42
End: 2022-09-23
Payer: COMMERCIAL

## 2022-09-23 VITALS
HEIGHT: 70 IN | SYSTOLIC BLOOD PRESSURE: 138 MMHG | WEIGHT: 235.3 LBS | DIASTOLIC BLOOD PRESSURE: 84 MMHG | RESPIRATION RATE: 16 BRPM | OXYGEN SATURATION: 97 % | BODY MASS INDEX: 33.69 KG/M2 | HEART RATE: 90 BPM

## 2022-09-23 DIAGNOSIS — G47.33 OSA (OBSTRUCTIVE SLEEP APNEA): ICD-10-CM

## 2022-09-23 DIAGNOSIS — F51.04 CHRONIC INSOMNIA: ICD-10-CM

## 2022-09-23 PROCEDURE — 99214 OFFICE O/P EST MOD 30 MIN: CPT | Performed by: NURSE PRACTITIONER

## 2022-09-23 RX ORDER — DOXEPIN 3 MG/1
3 TABLET, FILM COATED ORAL NIGHTLY PRN
Qty: 30 TABLET | Refills: 2 | Status: SHIPPED | OUTPATIENT
Start: 2022-09-23 | End: 2022-10-23

## 2022-09-23 ASSESSMENT — FIBROSIS 4 INDEX: FIB4 SCORE: 0.6

## 2022-09-23 NOTE — PROGRESS NOTES
"Chief Complaint   Patient presents with    Apnea       HPI:  Braden Hill is a 42 y.o. year old male here today for follow-up on annual TOMER visit.  Last seen 9/28/2021 by CARLO Dunlap. Patient lives in [facility].  PMH includes GERD, disordered sleep, palpitations, hypertriglyceridemia, obesity, TOMER, depression, chronic insomnia, seasonal allergies, chronic nasal congestion, former smoker.      He was ordered an auto CPAP at last visit but never received the device. He is currently using a non prescribed CPAP that was given to him by his mother. He goes to bed between 9-11 pm and wakes up at 5 am.  On the weekends he does sleep until about 8 or 9 AM.  He reports occasional morning headache and does report snoring.  He usually has trouble falling asleep and can take him up to 2 to 2-1/2 hours.  He attributes this issue primarily to having trouble turning off his brain.  He tried zaleplon which was prescribed by his PCP but did not work.  ZzzQuil over-the-counter is effective but he does not like how it makes him feel in the morning.  Patient is fatigued and tired during the day.  He does report fogginess and forgetfulness as well as \"zoning out\" during conversation.    Patient also suffers with seasonal allergies and chronic nasal congestion.    Is currently using an AirFit N 30 I and denies any difficulty with mask fit or pressures.  He has been using leftover supplies that his mother gave him.  He finds no difficulty with mask fit or pressures.  He does work 312-hour shifts at [employer] and usually feels extra fatigue after working their shifts.  When he goes to sleep he finds that he has a busy mind and he has wandering thoughts.  He denies watching TV or using electronics before bedtime.  He also has body soreness and finds that he tosses and turns if he cannot fall asleep.  With CPAP use, he denies any morning headaches, palpitations, concentration or memory problems.    And brought his device and " "which is a [employer] Respir[employer]TM Advances REMstar.  Currently set to 7 cm/H2O.  90-day compliance was checked and does show 94.4% use for the past 90 days with an average time of 7 hours and 30 minutes and a resultant AHI of 1.5 with no evidence of excessive leakage.    Sleep Study History:   HSS from 9/2/21 indicated severe obstructive sleep apnea with  Respiratory Event Index (CLAUDINE) of 68 per hour and worse in supine sleep with CLAUDINE at base 0. Apneas: 146 (Obstructive apnea index 22.2/hr, Central apnea index 0.5/hr, mixed 0 /hour), Hypopneas: 292. O2 Sat. ryan was 73%, avg O2 was 9 over1 % and baseline O2 at 98 %. O2 sat was below 89% for 70 min of the flow evaluation time. Avg HR 67 BPM.    ROS: As per HPI and otherwise negative if not stated.    Past Medical History:   Diagnosis Date    Has stopped breathing 9/21/2013    At night, fatigued all the time    Heart burn     High cholesterol     Hypertriglyceridemia 9/21/2013    Indigestion     Sleep apnea     no cpap    Snoring        Past Surgical History:   Procedure Laterality Date    INGUINAL HERNIA REPAIR ROBOTIC XI Right 2/22/2021    Procedure: REPAIR, HERNIA, INGUINAL, ROBOT-ASSISTED, USING DA GONZALO XI;  Surgeon: Ian Latham M.D.;  Location: SURGERY [facility];  Service: Gen Robotic    REPAIR, HERNIA, UMBILICAL, ROBOT-ASSISTED, USING DA TAYE  2/22/2021    Procedure: REPAIR,HERNIA,UMBILICAL,ROBOT-ASSISTED,USING DA GONZALO XI;  Surgeon: Ian Latham M.D.;  Location: SURGERY [facility];  Service: Gen Robotic       Family History   Problem Relation Age of Onset    Hypertension Father     Cancer Paternal Aunt         lung    Heart Disease Neg Hx     Diabetes Neg Hx     Stroke Neg Hx     Hyperlipidemia Neg Hx        Allergies as of 09/23/2022    (No Known Allergies)        Vitals:  /84 (BP Location: Left arm, Patient Position: Sitting, BP Cuff Size: Adult)   Pulse 90   Resp 16   Ht 1.778 m (5' 10\")   Wt 107 kg (235 lb 4.8 oz)   SpO2 97%     Current " medications as of today   Current Outpatient Medications   Medication Sig Dispense Refill    Doxepin HCl 3 MG Tab Take 3 mg by mouth at bedtime as needed (Insomnia) for up to 30 days. 30 Tablet 2    fluticasone (FLONASE) 50 MCG/ACT nasal spray Administer 1 Spray into affected nostril(S) every day.      montelukast (SINGULAIR) 10 MG Tab Take 1 Tablet by mouth every day. (Patient not taking: Reported on 9/23/2022) 30 Tablet 1     No current facility-administered medications for this visit.         Physical Exam:   Gen:           Alert and oriented, No apparent distress. Mood and affect appropriate, normal interaction with examiner.  Eyes:          PERRL, EOM intact, sclere white, conjunctive moist.  Ears:          Not examined.   Hearing:     Grossly intact.  Nose:          Normal, no lesions or deformities.  Dentition:    Good dentition.  Oropharynx:   Tongue normal, posterior pharynx without erythema or exudate.  Neck:        Supple, trachea midline, no masses.  Respiratory Effort: No intercostal retractions or use of accessory muscles.   Lung Auscultation:      Clear to auscultation bilaterally; no rales, rhonchi or wheezing.  CV:            Regular rate and rhythm. No murmurs, rubs or gallops.  Abd:           Not examined.   Lymphadenopathy: Not examined.  Gait and Station: Normal.  Digits and Nails: No clubbing, cyanosis, petechiae, or nodes.   Cranial Nerves: II-XII grossly intact.  Skin:        No rashes, lesions or ulcers noted.               Ext:           No cyanosis or edema.      Assessment:  1. TOMER (obstructive sleep apnea)  DME Mask and Supplies      2. Chronic insomnia            Plan:  Presents for follow-up on TOMER and insomnia.  He is currently using a Gail Resp[employer]ics REMstar device.  He would prefer to continue using that but but would like to receive mask and supplies through insurance.  Order placed for mask and supplies will be sent to [facility] in [place] as his preferred DME.  Patient will  follow-up in 1 year for annual visit.  If his device stops functioning, can consider order for CPAP or also consider options for patient to purchase device out-of-pocket.  Advised patient to reach out via phone or MyChart for any questions or concerns before next appointment.  Sleep hygiene discussed. Recommend keeping a set sleep/wake schedule. Logging enough hours of sleep. Limiting/Avoiding naps. No caffeine after noon and no heavy meals in the evening.   Chronic insomnia: Prescribing doxepin 3 mg, can increase to 6 mg if needed.  REM fresh and sonata (Zaleplon) were ineffective. The importance of cognitive restructuring and behavior modification therapy is emphasized for long-term improvement rather than the use of hypnotic agents, which may offer short term relief but may lead to the development of tolerance and side effects with prolonged use. Patient may require referral for cognitive behavioral therapy to Dr. Haider as his insomnia is at times  related to him having difficulty with shutting off his brain.     Please note that this dictation was created using voice recognition software. I have made every reasonable attempt to correct obvious errors, but it is possible there are errors of grammar and possibly content that I did not discover before finalizing the note.

## 2022-10-12 ENCOUNTER — TELEPHONE (OUTPATIENT)
Dept: SLEEP MEDICINE | Facility: MEDICAL CENTER | Age: 42
End: 2022-10-12
Payer: COMMERCIAL

## 2023-04-11 NOTE — ASSESSMENT & PLAN NOTE
Counseled patient on diet and exercise. Patient will continue to change diet. Will recheck in 3 months.    24.9

## 2023-06-07 ENCOUNTER — OFFICE VISIT (OUTPATIENT)
Dept: URGENT CARE | Facility: PHYSICIAN GROUP | Age: 43
End: 2023-06-07
Payer: COMMERCIAL

## 2023-06-07 VITALS
HEART RATE: 96 BPM | OXYGEN SATURATION: 99 % | TEMPERATURE: 97.6 F | BODY MASS INDEX: 33.72 KG/M2 | RESPIRATION RATE: 14 BRPM | WEIGHT: 235 LBS | DIASTOLIC BLOOD PRESSURE: 76 MMHG | SYSTOLIC BLOOD PRESSURE: 132 MMHG

## 2023-06-07 DIAGNOSIS — J01.90 ACUTE BACTERIAL RHINOSINUSITIS: ICD-10-CM

## 2023-06-07 DIAGNOSIS — R05.9 COUGH, UNSPECIFIED TYPE: ICD-10-CM

## 2023-06-07 DIAGNOSIS — B96.89 ACUTE BACTERIAL RHINOSINUSITIS: ICD-10-CM

## 2023-06-07 PROCEDURE — 99213 OFFICE O/P EST LOW 20 MIN: CPT | Performed by: STUDENT IN AN ORGANIZED HEALTH CARE EDUCATION/TRAINING PROGRAM

## 2023-06-07 PROCEDURE — 3075F SYST BP GE 130 - 139MM HG: CPT | Performed by: STUDENT IN AN ORGANIZED HEALTH CARE EDUCATION/TRAINING PROGRAM

## 2023-06-07 PROCEDURE — 3078F DIAST BP <80 MM HG: CPT | Performed by: STUDENT IN AN ORGANIZED HEALTH CARE EDUCATION/TRAINING PROGRAM

## 2023-06-07 RX ORDER — DOXYCYCLINE HYCLATE 100 MG
100 TABLET ORAL 2 TIMES DAILY
Qty: 14 TABLET | Refills: 0 | Status: SHIPPED | OUTPATIENT
Start: 2023-06-07 | End: 2023-06-14

## 2023-06-07 RX ORDER — BENZONATATE 100 MG/1
100 CAPSULE ORAL 3 TIMES DAILY PRN
Qty: 60 CAPSULE | Refills: 0 | Status: SHIPPED | OUTPATIENT
Start: 2023-06-07 | End: 2024-03-16

## 2023-06-07 ASSESSMENT — ENCOUNTER SYMPTOMS
COUGH: 1
CONSTIPATION: 0
DIARRHEA: 0
SHORTNESS OF BREATH: 0
PALPITATIONS: 0
DIZZINESS: 0
VOMITING: 0
MYALGIAS: 0
HEADACHES: 0
CHILLS: 0
SWEATS: 0
ABDOMINAL PAIN: 0
SORE THROAT: 0
SINUS PAIN: 1
FEVER: 0
NAUSEA: 0
WHEEZING: 0

## 2023-06-07 NOTE — PROGRESS NOTES
Subjective     Braden Hill is a 42 y.o. male who presents with Cough (X2weeks ) and Congestion            Braden is a 42 y.o. male who presents to urgent care with sinus pain/pressure, nasal congestion and cough.  Symptoms have been present for 2 weeks.  Symptoms gradually worsening.  No fever/chills.  Patient does report symptoms of fatigue.  Patient presents with his wife who has been sick with similar symptoms for the past month. No SOB/wheezing although he does report chest congestion.    Cough  This is a new problem. The current episode started 1 to 4 weeks ago. The problem has been unchanged. The cough is Productive of sputum. Associated symptoms include ear pain, nasal congestion and postnasal drip. Pertinent negatives include no chest pain, chills, ear congestion, fever, headaches, myalgias, rash, sore throat, shortness of breath, sweats or wheezing.       Review of Systems   Constitutional:  Positive for malaise/fatigue. Negative for chills and fever.   HENT:  Positive for congestion, ear pain, postnasal drip and sinus pain. Negative for sore throat.    Respiratory:  Positive for cough. Negative for shortness of breath and wheezing.    Cardiovascular:  Negative for chest pain and palpitations.   Gastrointestinal:  Negative for abdominal pain, constipation, diarrhea, nausea and vomiting.   Musculoskeletal:  Negative for myalgias.   Skin:  Negative for rash.   Neurological:  Negative for dizziness and headaches.   All other systems reviewed and are negative.             Objective     /76   Pulse 96   Temp 36.4 °C (97.6 °F) (Temporal)   Resp 14   Wt 107 kg (235 lb)   SpO2 99%   BMI 33.72 kg/m²      Physical Exam  Vitals reviewed.   Constitutional:       General: He is not in acute distress.     Appearance: Normal appearance. He is not toxic-appearing.   HENT:      Head: Normocephalic and atraumatic.      Right Ear: Ear canal and external ear normal. Tympanic membrane is erythematous and  bulging.      Left Ear: Tympanic membrane, ear canal and external ear normal.      Nose: Congestion present.      Right Sinus: Maxillary sinus tenderness and frontal sinus tenderness present.      Left Sinus: Maxillary sinus tenderness and frontal sinus tenderness present.      Mouth/Throat:      Lips: Pink.      Mouth: Mucous membranes are moist.      Pharynx: Oropharynx is clear. Uvula midline.   Eyes:      Extraocular Movements: Extraocular movements intact.      Conjunctiva/sclera: Conjunctivae normal.      Pupils: Pupils are equal, round, and reactive to light.   Cardiovascular:      Rate and Rhythm: Normal rate and regular rhythm.   Pulmonary:      Effort: Pulmonary effort is normal.      Breath sounds: Normal breath sounds.   Skin:     General: Skin is warm and dry.   Neurological:      General: No focal deficit present.      Mental Status: He is alert. Mental status is at baseline.                             Assessment & Plan        1. Acute bacterial rhinosinusitis  - doxycycline (VIBRAMYCIN) 100 MG Tab; Take 1 Tablet by mouth 2 times a day for 7 days.  Dispense: 14 Tablet; Refill: 0    2. Cough, unspecified type  - benzonatate (TESSALON) 100 MG Cap; Take 1 Capsule by mouth 3 times a day as needed for Cough.  Dispense: 60 Capsule; Refill: 0     Differential diagnoses, supportive care, and indications for immediate follow-up discussed with patient. Pathogenesis of diagnosis discussed including typical length and natural progression.      Instructed to return to urgent care or nearest emergency department if symptoms fail to improve, for any change in condition, further concerns, or new concerning symptoms.    Patient states understanding and agrees with the plan of care and discharge instructions.

## 2024-03-16 ENCOUNTER — OFFICE VISIT (OUTPATIENT)
Dept: URGENT CARE | Facility: PHYSICIAN GROUP | Age: 44
End: 2024-03-16
Payer: COMMERCIAL

## 2024-03-16 VITALS
WEIGHT: 248 LBS | HEART RATE: 79 BPM | HEIGHT: 70 IN | RESPIRATION RATE: 16 BRPM | TEMPERATURE: 98.1 F | DIASTOLIC BLOOD PRESSURE: 80 MMHG | BODY MASS INDEX: 35.5 KG/M2 | SYSTOLIC BLOOD PRESSURE: 128 MMHG | OXYGEN SATURATION: 97 %

## 2024-03-16 DIAGNOSIS — N63.42 SUBAREOLAR MASS OF LEFT BREAST: ICD-10-CM

## 2024-03-16 PROCEDURE — 99213 OFFICE O/P EST LOW 20 MIN: CPT | Performed by: FAMILY MEDICINE

## 2024-03-16 PROCEDURE — 3079F DIAST BP 80-89 MM HG: CPT | Performed by: FAMILY MEDICINE

## 2024-03-16 PROCEDURE — 3074F SYST BP LT 130 MM HG: CPT | Performed by: FAMILY MEDICINE

## 2024-03-16 ASSESSMENT — ENCOUNTER SYMPTOMS
WEIGHT LOSS: 0
FEVER: 0
CHILLS: 0

## 2024-03-16 NOTE — PROGRESS NOTES
Subjective:     Braden Hill is a 43 y.o. male who presents for Breast Mass (Left breast, swollen an tender x 2 months)    HPI  Pt presents for evaluation of an acute problem  Pt with small lump in the left anterior chest for the pst few months   Having pain in the area   Has not grown in side   Pain is worse when jogging or running   Pain is worse when touching the area   Feels that he can see the area visually and appears asymmetric compared to his other side  Never has bruising or erythema in the area  Never has open lesions   No axillary pain or swelling  No weight loss    Review of Systems   Constitutional:  Negative for chills, fever and weight loss.       PMH:  has a past medical history of Has stopped breathing (HCC) (9/21/2013), Heart burn, High cholesterol, Hypertriglyceridemia (9/21/2013), Indigestion, Sleep apnea, and Snoring.  MEDS: No current outpatient medications on file.  ALLERGIES: No Known Allergies  SURGHX:   Past Surgical History:   Procedure Laterality Date    INGUINAL HERNIA REPAIR ROBOTIC XI Right 2/22/2021    Procedure: REPAIR, HERNIA, INGUINAL, ROBOT-ASSISTED, USING DA GONZALO XI;  Surgeon: Ian Latham M.D.;  Location: SURGERY [facility];  Service: Gen Robotic    REPAIR, HERNIA, UMBILICAL, ROBOT-ASSISTED, USING DA TAYE  2/22/2021    Procedure: REPAIR,HERNIA,UMBILICAL,ROBOT-ASSISTED,USING DA GONZALO XI;  Surgeon: Ian Latham M.D.;  Location: SURGERY [facility];  Service: Gen Robotic     SOCHX:  reports that he has quit smoking. His smoking use included cigars and cigarettes. He started smoking about 6 years ago. He has a 10 pack-year smoking history. He quit smokeless tobacco use about 7 years ago.  His smokeless tobacco use included chew. He reports current alcohol use of about 3.6 oz of alcohol per week. He reports current drug use. Frequency: 7.00 times per week. Drugs: Marijuana and Inhaled.     Objective:   /80   Pulse 79   Temp 36.7 °C (98.1 °F) (Temporal)    "Resp 16   Ht 1.778 m (5' 10\")   Wt 112 kg (248 lb)   SpO2 97%   BMI 35.58 kg/m²     Physical Exam  Constitutional:       General: He is not in acute distress.     Appearance: He is well-developed. He is not diaphoretic.   Pulmonary:      Effort: Pulmonary effort is normal.      Comments: Small firm subareolar mass which is approximately 2 cm x 2 cm and tender to palpation, no overlying skin changes, no open lesions, no nipple discharge  Neurological:      Mental Status: He is alert.         Assessment/Plan:   Assessment    1. Subareolar mass of left breast  - Referral to establish with PCP  - MA-DIAGNOSTIC MAMMO BILAT W/TOMOSYNTHESIS W/CAD; Future    Patient with subareolar mass.  Possible gynecomastia versus neoplasm.  Does not appear consistent with abscess.  Recommended further evaluation and have ordered mammogram.  May need follow-up ultrasound depending on initial results.  Have scheduled this for patient within the next week and he will call Monday morning to see if they can get him in sooner.  Will follow-up test results.    "

## 2024-03-24 SDOH — HEALTH STABILITY: PHYSICAL HEALTH: ON AVERAGE, HOW MANY DAYS PER WEEK DO YOU ENGAGE IN MODERATE TO STRENUOUS EXERCISE (LIKE A BRISK WALK)?: 6 DAYS

## 2024-03-24 SDOH — ECONOMIC STABILITY: TRANSPORTATION INSECURITY
IN THE PAST 12 MONTHS, HAS THE LACK OF TRANSPORTATION KEPT YOU FROM MEDICAL APPOINTMENTS OR FROM GETTING MEDICATIONS?: NO

## 2024-03-24 SDOH — ECONOMIC STABILITY: INCOME INSECURITY: IN THE LAST 12 MONTHS, WAS THERE A TIME WHEN YOU WERE NOT ABLE TO PAY THE MORTGAGE OR RENT ON TIME?: NO

## 2024-03-24 SDOH — ECONOMIC STABILITY: FOOD INSECURITY: WITHIN THE PAST 12 MONTHS, THE FOOD YOU BOUGHT JUST DIDN'T LAST AND YOU DIDN'T HAVE MONEY TO GET MORE.: NEVER TRUE

## 2024-03-24 SDOH — ECONOMIC STABILITY: HOUSING INSECURITY
IN THE LAST 12 MONTHS, WAS THERE A TIME WHEN YOU DID NOT HAVE A STEADY PLACE TO SLEEP OR SLEPT IN A SHELTER (INCLUDING NOW)?: NO

## 2024-03-24 SDOH — ECONOMIC STABILITY: FOOD INSECURITY: WITHIN THE PAST 12 MONTHS, YOU WORRIED THAT YOUR FOOD WOULD RUN OUT BEFORE YOU GOT MONEY TO BUY MORE.: NEVER TRUE

## 2024-03-24 SDOH — ECONOMIC STABILITY: HOUSING INSECURITY: IN THE LAST 12 MONTHS, HOW MANY PLACES HAVE YOU LIVED?: 1

## 2024-03-24 SDOH — HEALTH STABILITY: PHYSICAL HEALTH: ON AVERAGE, HOW MANY MINUTES DO YOU ENGAGE IN EXERCISE AT THIS LEVEL?: 120 MIN

## 2024-03-24 SDOH — ECONOMIC STABILITY: INCOME INSECURITY: HOW HARD IS IT FOR YOU TO PAY FOR THE VERY BASICS LIKE FOOD, HOUSING, MEDICAL CARE, AND HEATING?: NOT VERY HARD

## 2024-03-24 SDOH — ECONOMIC STABILITY: TRANSPORTATION INSECURITY
IN THE PAST 12 MONTHS, HAS LACK OF TRANSPORTATION KEPT YOU FROM MEETINGS, WORK, OR FROM GETTING THINGS NEEDED FOR DAILY LIVING?: NO

## 2024-03-24 SDOH — HEALTH STABILITY: MENTAL HEALTH
STRESS IS WHEN SOMEONE FEELS TENSE, NERVOUS, ANXIOUS, OR CAN'T SLEEP AT NIGHT BECAUSE THEIR MIND IS TROUBLED. HOW STRESSED ARE YOU?: VERY MUCH

## 2024-03-24 SDOH — ECONOMIC STABILITY: TRANSPORTATION INSECURITY
IN THE PAST 12 MONTHS, HAS LACK OF RELIABLE TRANSPORTATION KEPT YOU FROM MEDICAL APPOINTMENTS, MEETINGS, WORK OR FROM GETTING THINGS NEEDED FOR DAILY LIVING?: NO

## 2024-03-24 ASSESSMENT — SOCIAL DETERMINANTS OF HEALTH (SDOH)
HOW OFTEN DO YOU HAVE A DRINK CONTAINING ALCOHOL: 2-3 TIMES A WEEK
HOW OFTEN DO YOU ATTEND CHURCH OR RELIGIOUS SERVICES?: 1 TO 4 TIMES PER YEAR
HOW OFTEN DO YOU GET TOGETHER WITH FRIENDS OR RELATIVES?: ONCE A WEEK
IN A TYPICAL WEEK, HOW MANY TIMES DO YOU TALK ON THE PHONE WITH FAMILY, FRIENDS, OR NEIGHBORS?: ONCE A WEEK
HOW MANY DRINKS CONTAINING ALCOHOL DO YOU HAVE ON A TYPICAL DAY WHEN YOU ARE DRINKING: 5 OR 6
HOW OFTEN DO YOU ATTENT MEETINGS OF THE CLUB OR ORGANIZATION YOU BELONG TO?: NEVER
HOW OFTEN DO YOU ATTENT MEETINGS OF THE CLUB OR ORGANIZATION YOU BELONG TO?: NEVER
HOW OFTEN DO YOU GET TOGETHER WITH FRIENDS OR RELATIVES?: ONCE A WEEK
HOW OFTEN DO YOU HAVE SIX OR MORE DRINKS ON ONE OCCASION: MONTHLY
HOW OFTEN DO YOU ATTEND CHURCH OR RELIGIOUS SERVICES?: 1 TO 4 TIMES PER YEAR
DO YOU BELONG TO ANY CLUBS OR ORGANIZATIONS SUCH AS CHURCH GROUPS UNIONS, FRATERNAL OR ATHLETIC GROUPS, OR SCHOOL GROUPS?: NO
HOW HARD IS IT FOR YOU TO PAY FOR THE VERY BASICS LIKE FOOD, HOUSING, MEDICAL CARE, AND HEATING?: NOT VERY HARD
WITHIN THE PAST 12 MONTHS, YOU WORRIED THAT YOUR FOOD WOULD RUN OUT BEFORE YOU GOT THE MONEY TO BUY MORE: NEVER TRUE
IN A TYPICAL WEEK, HOW MANY TIMES DO YOU TALK ON THE PHONE WITH FAMILY, FRIENDS, OR NEIGHBORS?: ONCE A WEEK
DO YOU BELONG TO ANY CLUBS OR ORGANIZATIONS SUCH AS CHURCH GROUPS UNIONS, FRATERNAL OR ATHLETIC GROUPS, OR SCHOOL GROUPS?: NO

## 2024-03-24 ASSESSMENT — LIFESTYLE VARIABLES
HOW OFTEN DO YOU HAVE A DRINK CONTAINING ALCOHOL: 2-3 TIMES A WEEK
AUDIT-C TOTAL SCORE: 7
HOW OFTEN DO YOU HAVE SIX OR MORE DRINKS ON ONE OCCASION: MONTHLY
SKIP TO QUESTIONS 9-10: 0
HOW MANY STANDARD DRINKS CONTAINING ALCOHOL DO YOU HAVE ON A TYPICAL DAY: 5 OR 6

## 2024-03-25 ENCOUNTER — HOSPITAL ENCOUNTER (OUTPATIENT)
Dept: RADIOLOGY | Facility: MEDICAL CENTER | Age: 44
End: 2024-03-25
Attending: FAMILY MEDICINE
Payer: COMMERCIAL

## 2024-03-25 DIAGNOSIS — N63.42 SUBAREOLAR MASS OF LEFT BREAST: ICD-10-CM

## 2024-03-25 PROCEDURE — G0279 TOMOSYNTHESIS, MAMMO: HCPCS

## 2024-03-25 PROCEDURE — 76642 ULTRASOUND BREAST LIMITED: CPT | Mod: LT

## 2024-03-26 ENCOUNTER — OFFICE VISIT (OUTPATIENT)
Dept: MEDICAL GROUP | Facility: PHYSICIAN GROUP | Age: 44
End: 2024-03-26
Attending: FAMILY MEDICINE
Payer: COMMERCIAL

## 2024-03-26 VITALS
HEIGHT: 70 IN | HEART RATE: 98 BPM | WEIGHT: 247.8 LBS | SYSTOLIC BLOOD PRESSURE: 114 MMHG | TEMPERATURE: 97.9 F | DIASTOLIC BLOOD PRESSURE: 78 MMHG | OXYGEN SATURATION: 97 % | BODY MASS INDEX: 35.48 KG/M2

## 2024-03-26 DIAGNOSIS — E78.1 HYPERTRIGLYCERIDEMIA: ICD-10-CM

## 2024-03-26 DIAGNOSIS — F33.1 MODERATE EPISODE OF RECURRENT MAJOR DEPRESSIVE DISORDER (HCC): ICD-10-CM

## 2024-03-26 DIAGNOSIS — R35.1 BENIGN PROSTATIC HYPERPLASIA WITH NOCTURIA: ICD-10-CM

## 2024-03-26 DIAGNOSIS — Z00.00 WELLNESS EXAMINATION: ICD-10-CM

## 2024-03-26 DIAGNOSIS — E55.9 VITAMIN D DEFICIENCY: ICD-10-CM

## 2024-03-26 DIAGNOSIS — R53.83 FATIGUE, UNSPECIFIED TYPE: ICD-10-CM

## 2024-03-26 DIAGNOSIS — N62 GYNECOMASTIA, MALE: ICD-10-CM

## 2024-03-26 DIAGNOSIS — G47.33 OSA (OBSTRUCTIVE SLEEP APNEA): ICD-10-CM

## 2024-03-26 DIAGNOSIS — N40.1 BENIGN PROSTATIC HYPERPLASIA WITH NOCTURIA: ICD-10-CM

## 2024-03-26 PROCEDURE — 3074F SYST BP LT 130 MM HG: CPT | Performed by: FAMILY MEDICINE

## 2024-03-26 PROCEDURE — 3078F DIAST BP <80 MM HG: CPT | Performed by: FAMILY MEDICINE

## 2024-03-26 PROCEDURE — 99215 OFFICE O/P EST HI 40 MIN: CPT | Performed by: FAMILY MEDICINE

## 2024-03-26 RX ORDER — SERTRALINE HYDROCHLORIDE 25 MG/1
25 TABLET, FILM COATED ORAL DAILY
Qty: 30 TABLET | Refills: 11 | Status: SHIPPED | OUTPATIENT
Start: 2024-03-26

## 2024-03-26 ASSESSMENT — PATIENT HEALTH QUESTIONNAIRE - PHQ9
SUM OF ALL RESPONSES TO PHQ QUESTIONS 1-9: 22
4. FEELING TIRED OR HAVING LITTLE ENERGY: NEARLY EVERY DAY
6. FEELING BAD ABOUT YOURSELF - OR THAT YOU ARE A FAILURE OR HAVE LET YOURSELF OR YOUR FAMILY DOWN: MORE THAN HALF THE DAYS
SUM OF ALL RESPONSES TO PHQ9 QUESTIONS 1 AND 2: 6
8. MOVING OR SPEAKING SO SLOWLY THAT OTHER PEOPLE COULD HAVE NOTICED. OR THE OPPOSITE, BEING SO FIGETY OR RESTLESS THAT YOU HAVE BEEN MOVING AROUND A LOT MORE THAN USUAL: NOT AT ALL
7. TROUBLE CONCENTRATING ON THINGS, SUCH AS READING THE NEWSPAPER OR WATCHING TELEVISION: NEARLY EVERY DAY
9. THOUGHTS THAT YOU WOULD BE BETTER OFF DEAD, OR OF HURTING YOURSELF: NEARLY EVERY DAY
3. TROUBLE FALLING OR STAYING ASLEEP OR SLEEPING TOO MUCH: NEARLY EVERY DAY
1. LITTLE INTEREST OR PLEASURE IN DOING THINGS: NEARLY EVERY DAY
2. FEELING DOWN, DEPRESSED, IRRITABLE, OR HOPELESS: NEARLY EVERY DAY
5. POOR APPETITE OR OVEREATING: MORE THAN HALF THE DAYS

## 2024-03-26 ASSESSMENT — ENCOUNTER SYMPTOMS
ABDOMINAL PAIN: 0
NAUSEA: 0
SHORTNESS OF BREATH: 0
VOMITING: 0

## 2024-03-26 NOTE — PROGRESS NOTES
"Subjective:     CC: Establish Care, Fatigue    HPI:   Braden presents today with    Problem   Gynecomastia, Male    First noticed it 6 mo ago  L sided  Patient was seen at  on 3/16/24  -had imaging done 3/25/24  Patient is not currently on any medications  Does use THC     Major Depressive Disorder    Chronic  Worsened over time  Was on medication once in the past  -only took for 4 weeks and he does not know what he was taking  Passive and fleeting SI         Fatigue    Chronic  Hx of TOMER for years  -using his mother's CPAP currently  -has had sleep studies in the past as well  Notes that he continues to not sleep well  -has trouble staying and falling asleep  -Zaleplon use in the past did not help  Notes that he gets up regularly to void at night  States that melatonin does not work  -has to use THC to help him fall asleep  --just a few hits, helps him fall asleep  Patient is also concerned that he has depression  Is having a lot of forgetfullness and brain fog  Hx of low testosterone     Hypertriglyceridemia    Diagnosed in September 2013. Started fenofibrate years ago but has not been on medication regularly. Would like lab work. Was also prescribed atorvastatin but does not believe he started it.           ROS:  Review of Systems   Respiratory:  Negative for shortness of breath.    Cardiovascular:  Negative for chest pain.   Gastrointestinal:  Negative for abdominal pain, nausea and vomiting.       Objective:     Exam:  /78 (BP Location: Left arm, Patient Position: Sitting, BP Cuff Size: Adult long)   Pulse 98   Temp 36.6 °C (97.9 °F) (Temporal)   Ht 1.778 m (5' 10\")   Wt 112 kg (247 lb 12.8 oz)   SpO2 97%   BMI 35.56 kg/m²  Body mass index is 35.56 kg/m².    Physical Exam  Constitutional:       Appearance: Normal appearance.   HENT:      Head: Normocephalic and atraumatic.      Mouth/Throat:      Mouth: Mucous membranes are moist.   Eyes:      Extraocular Movements: Extraocular movements intact.     "  Conjunctiva/sclera: Conjunctivae normal.      Pupils: Pupils are equal, round, and reactive to light.   Cardiovascular:      Rate and Rhythm: Normal rate and regular rhythm.      Heart sounds: No murmur heard.  Pulmonary:      Effort: Pulmonary effort is normal.      Breath sounds: Normal breath sounds. No wheezing.   Chest:   Breasts:     Breasts are asymmetrical.      Comments: +gynocomastia on L, no masses palpated  Abdominal:      General: Abdomen is flat. Bowel sounds are normal.      Palpations: Abdomen is soft.   Musculoskeletal:      Cervical back: Neck supple.   Lymphadenopathy:      Upper Body:      Right upper body: No supraclavicular adenopathy.      Left upper body: No supraclavicular adenopathy.   Skin:     General: Skin is warm and dry.   Neurological:      General: No focal deficit present.      Mental Status: He is alert and oriented to person, place, and time.   Psychiatric:         Mood and Affect: Mood normal.         Behavior: Behavior normal.       Depression Screening    Little interest or pleasure in doing things?   Nearly every day  Feeling down, depressed , or hopeless?  Nearly every day  Trouble falling or staying asleep, or sleeping too much?   Nearly every day  Feeling tired or having little energy?   Nearly every day  Poor appetite or overeating?   More than half the days  Feeling bad about yourself - or that you are a failure or have let yourself or your family down?  More than half the days  Trouble concentrating on things, such as reading the newspaper or watching television?  Nearly every day  Moving or speaking so slowly that other people could have noticed.  Or the opposite - being so fidgety or restless that you have been moving around a lot more than usual?   Not at all  Thoughts that you would be better off dead, or of hurting yourself?   Nearly every day  Patient Health Questionnaire Score:  (!) 22      If depressive symptoms identified deferred to follow up visit unless  specifically addressed in assesment and plan.    Interpretation of PHQ-9 Total Score   Score Severity   1-4 No Depression   5-9 Mild Depression   10-14 Moderate Depression   15-19 Moderately Severe Depression   20-27 Severe Depression     STOPBANG - Sleep Apnea Screening      Flowsheet Row Most Recent Value   S - Have you been told that you SNORE? Yes   T - Are you often TIRED during the day? Yes   O - Do you know if you stop breathing or has anyone witnessed you stop breathing while you were asleep? (OBSTRUCTION) Yes   P - Do you have high blood PRESSURE or on medication to control high blood pressure? No   B - Is your Body Mass Index greater than 35? (BMI) Yes   A - Are you 50 years old or older? (AGE) No   N - Are you a male with a NECK circumference greater than 17 inches, or a female with a neck circumference greater than 16 inches? Yes   G - Are you male? (GENDER) Yes   STOPBANG Total Score 6   TOMER Risk High Risk               Assessment & Plan:     43 y.o. male with the following -    1. Hypertriglyceridemia  - Lipid Profile; Future    Hx of but chronic  Patient is not on any medications currently  Will order lab work    2. Fatigue, unspecified type  - Referral to Pulmonary and Sleep Medicine  - VIT B12,  FOLIC ACID  - IRON/TOTAL IRON BIND; Future    Chronic  Likely multifactorial  -TOMER, depression, BPH  Will order lab work and place pulmonology referral    3. Moderate episode of recurrent major depressive disorder (HCC)  - sertraline (ZOLOFT) 25 MG tablet; Take 1 Tablet by mouth every day.  Dispense: 30 Tablet; Refill: 11  - Referral to Behavioral Health    Chronic  Uncontrolled   PHQ 9 score of 22 in office today  Will start patient on Zoloft  -Discussed possible side effects  Will place mental health referral  Return precautions given      4. TOMER (obstructive sleep apnea)  - Referral to Pulmonary and Sleep Medicine    Chronic  Pulmonology referral has been placed      5. Gynecomastia, male  - TSH WITH  REFLEX TO FT4; Future  - HEMOGLOBIN A1C; Future  - SEX HORMONE BINDING GLOBULIN; Future  - PROLACTIN; Future  - LUTEINIZING HORMONE SERUM; Future    Etiology unclear  Reviewed imaging and urgent care records  Will order additional lab work    6. Vitamin D deficiency  - VITAMIN D,25 HYDROXY (DEFICIENCY); Future    Due for lab work    9. Wellness examination  - Lipid Profile; Future  - INSULIN FASTING; Future  - TSH WITH REFLEX TO FT4; Future  - CBC WITH DIFFERENTIAL; Future  - Comp Metabolic Panel; Future  - HEMOGLOBIN A1C; Future  - PROSTATE SPECIFIC AG SCREENING; Future  - Testosterone, Free & Total, Adult Male (w/SHBG); Future    Due for lab work and annual exam  Will order lab work today    10. Benign prostatic hyperplasia with nocturia  - PROSTATE SPECIFIC AG SCREENING; Future     Chronic  Contributing to his sleep disturbances  Ordered lab work    I spent a total of 54 minutes with record review, exam, communication with the patient, communication with other providers, and documentation of this encounter.      Return in about 2 weeks (around 4/9/2024) for Med F/U, Lab F/U.    Please note that this dictation was created using voice recognition software. I have made every reasonable attempt to correct obvious errors, but I expect that there are errors of grammar and possibly content that I did not discover before finalizing the note.

## 2024-04-13 ENCOUNTER — HOSPITAL ENCOUNTER (OUTPATIENT)
Dept: LAB | Facility: MEDICAL CENTER | Age: 44
End: 2024-04-13
Attending: FAMILY MEDICINE
Payer: COMMERCIAL

## 2024-04-13 DIAGNOSIS — R35.1 BENIGN PROSTATIC HYPERPLASIA WITH NOCTURIA: ICD-10-CM

## 2024-04-13 DIAGNOSIS — E55.9 VITAMIN D DEFICIENCY: ICD-10-CM

## 2024-04-13 DIAGNOSIS — E78.1 HYPERTRIGLYCERIDEMIA: ICD-10-CM

## 2024-04-13 DIAGNOSIS — N40.1 BENIGN PROSTATIC HYPERPLASIA WITH NOCTURIA: ICD-10-CM

## 2024-04-13 DIAGNOSIS — R53.83 FATIGUE, UNSPECIFIED TYPE: ICD-10-CM

## 2024-04-13 DIAGNOSIS — N62 GYNECOMASTIA, MALE: ICD-10-CM

## 2024-04-13 DIAGNOSIS — Z00.00 WELLNESS EXAMINATION: ICD-10-CM

## 2024-04-13 LAB
25(OH)D3 SERPL-MCNC: 59 NG/ML (ref 30–100)
ALBUMIN SERPL BCP-MCNC: 4.8 G/DL (ref 3.2–4.9)
ALBUMIN/GLOB SERPL: 1.7 G/DL
ALP SERPL-CCNC: 100 U/L (ref 30–99)
ALT SERPL-CCNC: 39 U/L (ref 2–50)
ANION GAP SERPL CALC-SCNC: 14 MMOL/L (ref 7–16)
AST SERPL-CCNC: 32 U/L (ref 12–45)
BASOPHILS # BLD AUTO: 0.5 % (ref 0–1.8)
BASOPHILS # BLD: 0.03 K/UL (ref 0–0.12)
BILIRUB SERPL-MCNC: 0.3 MG/DL (ref 0.1–1.5)
BUN SERPL-MCNC: 17 MG/DL (ref 8–22)
CALCIUM ALBUM COR SERPL-MCNC: 9 MG/DL (ref 8.5–10.5)
CALCIUM SERPL-MCNC: 9.6 MG/DL (ref 8.5–10.5)
CHLORIDE SERPL-SCNC: 104 MMOL/L (ref 96–112)
CHOLEST SERPL-MCNC: 461 MG/DL (ref 100–199)
CO2 SERPL-SCNC: 21 MMOL/L (ref 20–33)
CREAT SERPL-MCNC: 0.77 MG/DL (ref 0.5–1.4)
EOSINOPHIL # BLD AUTO: 0.09 K/UL (ref 0–0.51)
EOSINOPHIL NFR BLD: 1.6 % (ref 0–6.9)
ERYTHROCYTE [DISTWIDTH] IN BLOOD BY AUTOMATED COUNT: 41.3 FL (ref 35.9–50)
EST. AVERAGE GLUCOSE BLD GHB EST-MCNC: 100 MG/DL
FASTING STATUS PATIENT QL REPORTED: NORMAL
FOLATE SERPL-MCNC: 35.8 NG/ML
GFR SERPLBLD CREATININE-BSD FMLA CKD-EPI: 113 ML/MIN/1.73 M 2
GLOBULIN SER CALC-MCNC: 2.9 G/DL (ref 1.9–3.5)
GLUCOSE SERPL-MCNC: 91 MG/DL (ref 65–99)
HBA1C MFR BLD: 5.1 % (ref 4–5.6)
HCT VFR BLD AUTO: 44.4 % (ref 42–52)
HDLC SERPL-MCNC: ABNORMAL MG/DL
HGB BLD-MCNC: 15.9 G/DL (ref 14–18)
IMM GRANULOCYTES # BLD AUTO: 0.02 K/UL (ref 0–0.11)
IMM GRANULOCYTES NFR BLD AUTO: 0.4 % (ref 0–0.9)
IRON SATN MFR SERPL: 32 % (ref 15–55)
IRON SERPL-MCNC: 111 UG/DL (ref 50–180)
LDLC SERPL CALC-MCNC: ABNORMAL MG/DL
LH SERPL-ACNC: 2.7 IU/L (ref 1.7–8.6)
LYMPHOCYTES # BLD AUTO: 2.19 K/UL (ref 1–4.8)
LYMPHOCYTES NFR BLD: 40 % (ref 22–41)
MCH RBC QN AUTO: 31.4 PG (ref 27–33)
MCHC RBC AUTO-ENTMCNC: 35.8 G/DL (ref 32.3–36.5)
MCV RBC AUTO: 87.7 FL (ref 81.4–97.8)
MONOCYTES # BLD AUTO: 0.48 K/UL (ref 0–0.85)
MONOCYTES NFR BLD AUTO: 8.8 % (ref 0–13.4)
NEUTROPHILS # BLD AUTO: 2.67 K/UL (ref 1.82–7.42)
NEUTROPHILS NFR BLD: 48.7 % (ref 44–72)
NRBC # BLD AUTO: 0 K/UL
NRBC BLD-RTO: 0 /100 WBC (ref 0–0.2)
PLATELET # BLD AUTO: 334 K/UL (ref 164–446)
PMV BLD AUTO: 10 FL (ref 9–12.9)
POTASSIUM SERPL-SCNC: 4.2 MMOL/L (ref 3.6–5.5)
PROLACTIN SERPL-MCNC: 45.7 NG/ML (ref 2.1–17.7)
PROT SERPL-MCNC: 7.7 G/DL (ref 6–8.2)
PSA SERPL-MCNC: 1.42 NG/ML (ref 0–4)
RBC # BLD AUTO: 5.06 M/UL (ref 4.7–6.1)
SODIUM SERPL-SCNC: 139 MMOL/L (ref 135–145)
TIBC SERPL-MCNC: 346 UG/DL (ref 250–450)
TRIGL SERPL-MCNC: 1652 MG/DL (ref 0–149)
TSH SERPL DL<=0.005 MIU/L-ACNC: 0.76 UIU/ML (ref 0.38–5.33)
UIBC SERPL-MCNC: 235 UG/DL (ref 110–370)
VIT B12 SERPL-MCNC: 921 PG/ML (ref 211–911)
WBC # BLD AUTO: 5.5 K/UL (ref 4.8–10.8)

## 2024-04-13 PROCEDURE — 82607 VITAMIN B-12: CPT

## 2024-04-13 PROCEDURE — 82746 ASSAY OF FOLIC ACID SERUM: CPT

## 2024-04-13 PROCEDURE — 85025 COMPLETE CBC W/AUTO DIFF WBC: CPT

## 2024-04-13 PROCEDURE — 84146 ASSAY OF PROLACTIN: CPT

## 2024-04-13 PROCEDURE — 83036 HEMOGLOBIN GLYCOSYLATED A1C: CPT

## 2024-04-13 PROCEDURE — 84402 ASSAY OF FREE TESTOSTERONE: CPT

## 2024-04-13 PROCEDURE — 82306 VITAMIN D 25 HYDROXY: CPT

## 2024-04-13 PROCEDURE — 83540 ASSAY OF IRON: CPT

## 2024-04-13 PROCEDURE — 84403 ASSAY OF TOTAL TESTOSTERONE: CPT

## 2024-04-13 PROCEDURE — 36415 COLL VENOUS BLD VENIPUNCTURE: CPT

## 2024-04-13 PROCEDURE — 84153 ASSAY OF PSA TOTAL: CPT

## 2024-04-13 PROCEDURE — 80061 LIPID PANEL: CPT

## 2024-04-13 PROCEDURE — 84443 ASSAY THYROID STIM HORMONE: CPT

## 2024-04-13 PROCEDURE — 83002 ASSAY OF GONADOTROPIN (LH): CPT

## 2024-04-13 PROCEDURE — 83525 ASSAY OF INSULIN: CPT

## 2024-04-13 PROCEDURE — 80053 COMPREHEN METABOLIC PANEL: CPT

## 2024-04-13 PROCEDURE — 84270 ASSAY OF SEX HORMONE GLOBUL: CPT

## 2024-04-13 PROCEDURE — 83550 IRON BINDING TEST: CPT

## 2024-04-15 LAB
INSULIN P FAST SERPL-ACNC: 11 UIU/ML (ref 3–25)
SHBG SERPL-SCNC: 18 NMOL/L (ref 17–56)
TESTOST FREE MFR SERPL: 2.3 % (ref 1.6–2.9)
TESTOST FREE SERPL-MCNC: 27 PG/ML (ref 47–244)
TESTOST SERPL-MCNC: 120 NG/DL (ref 300–890)

## 2024-05-01 ENCOUNTER — APPOINTMENT (OUTPATIENT)
Dept: MEDICAL GROUP | Facility: PHYSICIAN GROUP | Age: 44
End: 2024-05-01
Payer: COMMERCIAL

## 2024-05-03 ENCOUNTER — APPOINTMENT (OUTPATIENT)
Dept: MEDICAL GROUP | Facility: PHYSICIAN GROUP | Age: 44
End: 2024-05-03
Payer: COMMERCIAL

## 2024-05-03 VITALS — HEIGHT: 70 IN | BODY MASS INDEX: 35.07 KG/M2 | RESPIRATION RATE: 14 BRPM | WEIGHT: 245 LBS

## 2024-05-03 DIAGNOSIS — R79.89 ELEVATED PROLACTIN LEVEL: ICD-10-CM

## 2024-05-03 DIAGNOSIS — R79.89 LOW TESTOSTERONE: ICD-10-CM

## 2024-05-03 DIAGNOSIS — E78.1 HYPERTRIGLYCERIDEMIA: ICD-10-CM

## 2024-05-03 DIAGNOSIS — G47.33 OSA (OBSTRUCTIVE SLEEP APNEA): ICD-10-CM

## 2024-05-03 DIAGNOSIS — F33.1 MODERATE EPISODE OF RECURRENT MAJOR DEPRESSIVE DISORDER (HCC): ICD-10-CM

## 2024-05-03 PROCEDURE — 99214 OFFICE O/P EST MOD 30 MIN: CPT | Mod: 95 | Performed by: FAMILY MEDICINE

## 2024-05-03 RX ORDER — FENOFIBRATE 145 MG/1
145 TABLET, COATED ORAL DAILY
Qty: 90 TABLET | Refills: 3 | Status: SHIPPED | OUTPATIENT
Start: 2024-05-03

## 2024-05-03 ASSESSMENT — ENCOUNTER SYMPTOMS
SHORTNESS OF BREATH: 0
ABDOMINAL PAIN: 0
VOMITING: 0
NAUSEA: 0
PALPITATIONS: 0

## 2024-05-03 ASSESSMENT — FIBROSIS 4 INDEX: FIB4 SCORE: 0.68

## 2024-05-03 NOTE — PROGRESS NOTES
Virtual Visit: Established Patient   This visit was conducted via Zoom using secure and encrypted videoconferencing technology.   The patient was in a private location outside of their home in the state of [place].    The patient's identity was confirmed and verbal consent was obtained for this virtual visit.    Subjective:   CC:   Chief Complaint   Patient presents with    Follow-Up     Braden Hill is a 43 y.o. male presenting for evaluation and management of:    Problem   Major Depressive Disorder    Chronic  Worsened over time  Was on medication once in the past  -only took for 4 weeks and he does not know what he was taking  Passive and fleeting SI  Is feeling a bit better  -is currently on zoloft 25 mg         Temo (Obstructive Sleep Apnea)    Patient has an appointment with sleep medicine on 5/23/24     Low Testosterone    Chronic  Patient would like to review his lab work today     Hypertriglyceridemia    Diagnosed in September 2013. Started fenofibrate years ago but has not been on medication regularly. Would like to review lab work. Was also prescribed atorvastatin but does not believe he started it.          ROS   Review of Systems   Respiratory:  Negative for shortness of breath.    Cardiovascular:  Negative for chest pain and palpitations.   Gastrointestinal:  Negative for abdominal pain, nausea and vomiting.        Current medicines (including changes today)  Current Outpatient Medications   Medication Sig Dispense Refill    fenofibrate (TRICOR) 145 MG Tab Take 1 Tablet by mouth every day. 90 Tablet 3    sertraline (ZOLOFT) 25 MG tablet Take 1 Tablet by mouth every day. 30 Tablet 11     No current facility-administered medications for this visit.       Patient Active Problem List    Diagnosis Date Noted    Gynecomastia, male 03/26/2024    Nasal congestion 09/08/2022    Chronic pansinusitis 09/08/2022    Chronic nasal congestion 06/01/2021    Seasonal allergies 05/26/2021    Non-recurrent bilateral  "inguinal hernia without obstruction or gangrene 01/19/2021    Insomnia 12/16/2019    Irregular heart beat 12/12/2019    Major depressive disorder 12/12/2019    History of tobacco abuse 11/26/2018    Elevated blood pressure reading 08/08/2018    Disordered sleep 08/08/2018    TOMER (obstructive sleep apnea) 08/08/2018    Gastroesophageal reflux disease 08/08/2018    Obesity (BMI 30-39.9) 08/08/2018    Elevated blood sugar 03/31/2017    Vitamin D deficiency 09/22/2015    Low testosterone 09/15/2015    LFTs abnormal 09/15/2015    BMI 34.0-34.9,adult 09/10/2015    Fatigue 09/10/2015    Palpitations 09/10/2015    Hypertriglyceridemia 09/21/2013        Objective:   Resp 14   Ht 1.778 m (5' 10\")   Wt 111 kg (245 lb)   BMI 35.15 kg/m²     Physical Exam:  Constitutional: Alert, no distress, well-groomed.  Skin: No rashes in visible areas.  Eye: Round. Conjunctiva clear, lids normal. No icterus.   ENMT: Lips pink without lesions, good dentition, moist mucous membranes. Phonation normal.  Neck: No masses, no thyromegaly. Moves freely without pain.  Respiratory: Unlabored respiratory effort, no cough or audible wheeze  Psych: Alert and oriented x3, normal affect and mood.     Assessment and Plan:   The following treatment plan was discussed:     1. Hypertriglyceridemia  - fenofibrate (TRICOR) 145 MG Tab; Take 1 Tablet by mouth every day.  Dispense: 90 Tablet; Refill: 3    Chronic  Discussed treatment options and patient would like to restart fenofibrate  ASCVD 14.8%  Discussed lifestyle modifications    2. Elevated prolactin level  - MR-BRAIN PITUITARY W/WO; Future    May be contributing to his low testosterone levels  Will order MRI    3. Moderate episode of recurrent major depressive disorder (HCC)    Stable  Chronic  Patient to continue zoloft 25 mg    4. TOMER (obstructive sleep apnea)    Patient to follow up with Sleep Medicine as instructed    5. Low testosterone    Will bring patient in 1 in week to discuss treatment " options in further detail    Follow-up: Return in about 1 week (around 5/10/2024) for Lab F/U.

## 2024-05-09 ENCOUNTER — OFFICE VISIT (OUTPATIENT)
Dept: MEDICAL GROUP | Facility: PHYSICIAN GROUP | Age: 44
End: 2024-05-09
Payer: COMMERCIAL

## 2024-05-09 ENCOUNTER — HOSPITAL ENCOUNTER (OUTPATIENT)
Facility: MEDICAL CENTER | Age: 44
End: 2024-05-09
Attending: FAMILY MEDICINE
Payer: COMMERCIAL

## 2024-05-09 VITALS
TEMPERATURE: 98.4 F | HEIGHT: 70 IN | HEART RATE: 97 BPM | SYSTOLIC BLOOD PRESSURE: 122 MMHG | DIASTOLIC BLOOD PRESSURE: 82 MMHG | WEIGHT: 250.4 LBS | OXYGEN SATURATION: 96 % | BODY MASS INDEX: 35.85 KG/M2

## 2024-05-09 DIAGNOSIS — E29.1 HYPOGONADISM IN MALE: ICD-10-CM

## 2024-05-09 DIAGNOSIS — N62 GYNECOMASTIA, MALE: ICD-10-CM

## 2024-05-09 DIAGNOSIS — Z51.81 THERAPEUTIC DRUG MONITORING: ICD-10-CM

## 2024-05-09 DIAGNOSIS — E78.1 HYPERTRIGLYCERIDEMIA: ICD-10-CM

## 2024-05-09 PROCEDURE — 99214 OFFICE O/P EST MOD 30 MIN: CPT | Performed by: FAMILY MEDICINE

## 2024-05-09 PROCEDURE — 3079F DIAST BP 80-89 MM HG: CPT | Performed by: FAMILY MEDICINE

## 2024-05-09 PROCEDURE — 3074F SYST BP LT 130 MM HG: CPT | Performed by: FAMILY MEDICINE

## 2024-05-09 RX ORDER — TESTOSTERONE 16.2 MG/G
20.25 GEL TRANSDERMAL DAILY
Qty: 75 G | Refills: 0 | Status: SHIPPED | OUTPATIENT
Start: 2024-05-09 | End: 2024-05-29

## 2024-05-09 ASSESSMENT — ENCOUNTER SYMPTOMS
NAUSEA: 0
SHORTNESS OF BREATH: 0
PALPITATIONS: 0
VOMITING: 0
ABDOMINAL PAIN: 0

## 2024-05-09 ASSESSMENT — FIBROSIS 4 INDEX: FIB4 SCORE: 0.68

## 2024-05-09 NOTE — PROGRESS NOTES
"Subjective:     CC: Testosterone    HPI:   Braden presents today with    Problem   Gynecomastia, Male    First noticed it 6 mo ago  L sided  Patient was seen at  on 3/16/24  -had imaging done 3/25/24  Patient is not currently on any medications  Does use THC  Patient has a slightly elevated prolactin level around 45  -MRI of the pituitary has been ordered but patient has not scheduled it yet      Hypertriglyceridemia    Diagnosed in September 2013. Started fenofibrate years ago but has not been on medication regularly. Would like to review lab work. Was also prescribed atorvastatin but does not believe he started it. Patient states that he has restarted the fenofibrate     ASCVD 14.8%    Lab Results   Component Value Date/Time    CHOLSTRLTOT 461 (H) 04/13/2024 09:50 AM    LDL see below 04/13/2024 09:50 AM    HDL see below 04/13/2024 09:50 AM    TRIGLYCERIDE 1652 (H) 04/13/2024 09:50 AM                    ROS:  Review of Systems   Respiratory:  Negative for shortness of breath.    Cardiovascular:  Negative for chest pain and palpitations.   Gastrointestinal:  Negative for abdominal pain, nausea and vomiting.       Objective:     Exam:  /82 (BP Location: Right arm, Patient Position: Sitting, BP Cuff Size: Adult long)   Pulse 97   Temp 36.9 °C (98.4 °F) (Temporal)   Ht 1.778 m (5' 10\")   Wt 114 kg (250 lb 6.4 oz)   SpO2 96%   BMI 35.93 kg/m²  Body mass index is 35.93 kg/m².    Physical Exam  Constitutional:       Appearance: Normal appearance.   HENT:      Head: Normocephalic and atraumatic.      Mouth/Throat:      Mouth: Mucous membranes are moist.   Eyes:      Extraocular Movements: Extraocular movements intact.      Conjunctiva/sclera: Conjunctivae normal.      Pupils: Pupils are equal, round, and reactive to light.   Cardiovascular:      Rate and Rhythm: Normal rate and regular rhythm.      Heart sounds: No murmur heard.  Pulmonary:      Effort: Pulmonary effort is normal.      Breath sounds: Normal " breath sounds. No wheezing.   Abdominal:      General: Abdomen is flat. Bowel sounds are normal.      Palpations: Abdomen is soft.   Musculoskeletal:      Cervical back: Neck supple.   Skin:     General: Skin is warm and dry.   Neurological:      General: No focal deficit present.      Mental Status: He is alert and oriented to person, place, and time.   Psychiatric:         Mood and Affect: Mood normal.           Assessment & Plan:     43 y.o. male with the following -    1. Hypogonadism in male  2. Therapeutic drug monitoring  - Controlled Substance Treatment Agreement  - Pain Management Screen; Future  - Testosterone, Free & Total, Adult Male (w/SHBG); Future    Will start patient on topical testosterone  -discussed possible SE  May be affected by his elevated prolactin levels  -encouraged patient to get his MRI done  CS agreement signed today   reviewed  UDS done  Will repeat lab work in 2-4 weeks    3. Gynecomastia, male    Chronic  Patient found to have slightly elevated prolactin levels  -MRI of the pituitary was ordered and encouraged patient to have done     4. Hypertriglyceridemia    Chronic  Patient tolerating fenofibrate 145 mg daily  -will continue and recheck lab work in 3-6 mo              Return in about 3 months (around 8/9/2024) for Med F/U-televisit.    Please note that this dictation was created using voice recognition software. I have made every reasonable attempt to correct obvious errors, but I expect that there are errors of grammar and possibly content that I did not discover before finalizing the note.

## 2024-05-14 LAB
1OH-MIDAZOLAM UR QL SCN: NOT DETECTED
6MAM UR QL: NOT DETECTED
7AMINOCLONAZEPAM UR QL: NOT DETECTED
A-OH ALPRAZ UR QL: NOT DETECTED
ALPRAZ UR QL: NOT DETECTED
AMPHET UR QL SCN: NOT DETECTED
ANNOTATION COMMENT IMP: NORMAL
BARBITURATES UR QL: NEGATIVE
BUPRENORPHINE UR QL: NOT DETECTED
BZE UR QL: NEGATIVE
CARBOXYTHC UR QL: NORMAL
CARISOPRODOL UR QL: NEGATIVE
CLONAZEPAM UR QL: NOT DETECTED
CODEINE UR QL: NOT DETECTED
CREAT UR-MCNC: 109.4 MG/DL (ref 20–400)
DIAZEPAM UR QL: NOT DETECTED
ETHYL GLUCURONIDE UR QL: NEGATIVE
FENTANYL UR QL: NOT DETECTED
GABAPENTIN UR QL CFM: NOT DETECTED
HYDROCODONE UR QL: NOT DETECTED
HYDROMORPHONE UR QL: NOT DETECTED
LORAZEPAM UR QL: NOT DETECTED
MDA UR QL: NOT DETECTED
MDEA UR QL: NOT DETECTED
MDMA UR QL: NOT DETECTED
ME-PHENIDATE UR QL: NOT DETECTED
METHADONE UR QL: NEGATIVE
METHAMPHET UR QL: NOT DETECTED
MIDAZOLAM UR QL SCN: NOT DETECTED
MORPHINE UR QL: NOT DETECTED
NALOXONE UR QL CFM: NOT DETECTED
NORBUPRENORPHINE UR QL CFM: NOT DETECTED
NORDIAZEPAM UR QL: NOT DETECTED
NORFENTANYL UR QL: NOT DETECTED
NORHYDROCODONE UR QL CFM: NOT DETECTED
NORMEPERIDINE UR QL CFM: NOT DETECTED
NOROXYCODONE UR QL CFM: NOT DETECTED
NOROXYMORPHONE UR QL SCN: NOT DETECTED
OXAZEPAM UR QL: NOT DETECTED
OXYCODONE UR QL: NOT DETECTED
OXYMORPHONE UR QL: NOT DETECTED
PATHOLOGY STUDY: NORMAL
PCP UR QL: NEGATIVE
PHENTERMINE UR QL: NOT DETECTED
PREGABALIN UR QL CFM: NOT DETECTED
SERVICE CMNT-IMP: NORMAL
TAPENTADOL UR QL SCN: NOT DETECTED
TAPENTADOL UR QL SCN: NOT DETECTED
TEMAZEPAM UR QL: NOT DETECTED
TRAMADOL UR QL: NEGATIVE
ZOLPIDEM PHENYL-4-CARB UR QL SCN: NOT DETECTED
ZOLPIDEM UR QL: NOT DETECTED

## 2024-05-17 NOTE — PROGRESS NOTES
[facility] Follow-up Visit    Date of Visit: 5/23/2024     CC:  Follow-up for TOMER management      HPI:  Braden Hill is a very pleasant 43 y.o. year old male former smoker (10 pack-years), with a PMHx of TOMER, elevated BMI, GERD, TOMER, depression, seasonal allergies, chronic nasal congestion who presented to the [facility] for a regular follow up. Last seen in the office on 9/23/2022 with CARLO Cortés.     Patient presents for annual compliance.  Patient reports that he is currently using his mother's machine and never got a machine out of his home.  He is now interested in obtaining the machine since you are.  He states he is having significant issues with insomnia and is having resulting brain fog and issues concentrating with some daytime drowsiness.  Patient states that he will take 2-3 hours to fall asleep and reports that his mind will start to wander/race.  Patient denies any significant morning headaches, drowsiness while driving, snoring, gasping or apneas, dry mouth, aerophagia or skin irritation.  He will occasionally have a mask leak.  Patient was to bed between 9-10 PM and wakes up at 5:30 AM.  He will have multiple awakenings at night will not have any issues with sleep.  He does not nap.    DME provider:  None  Device: [employer]  Settings: CPAP @7 CWP  When: Unknown  Mask:   Chin strap:    Cleaning regimen:    Compliance:  Compliance data reviewed showing 100% usage > 4hours in last 30 days. Average AHI 2.9 events/hour.  Average time in large leak per day 0 seconds. Patient continues to use and benefit from machine.      Sleep History:  HSS 9/2/2021-      Patient Active Problem List    Diagnosis Date Noted    TOMER on CPAP 08/08/2018    Insomnia 12/16/2019    Gynecomastia, male 03/26/2024    Nasal congestion 09/08/2022    Chronic pansinusitis 09/08/2022    Chronic nasal congestion 06/01/2021    Seasonal allergies 05/26/2021    Non-recurrent bilateral inguinal hernia  without obstruction or gangrene 01/19/2021    Irregular heart beat 12/12/2019    Major depressive disorder 12/12/2019    History of tobacco abuse 11/26/2018    Elevated blood pressure reading 08/08/2018    Disordered sleep 08/08/2018    Gastroesophageal reflux disease 08/08/2018    Obesity (BMI 30-39.9) 08/08/2018    Elevated blood sugar 03/31/2017    Vitamin D deficiency 09/22/2015    Low testosterone 09/15/2015    LFTs abnormal 09/15/2015    BMI 34.0-34.9,adult 09/10/2015    Fatigue 09/10/2015    Palpitations 09/10/2015    Hypertriglyceridemia 09/21/2013     Past Medical History:   Diagnosis Date    Has stopped breathing (HCC) 9/21/2013    At night, fatigued all the time    Heart burn     High cholesterol     Hypertriglyceridemia 9/21/2013    Indigestion     Sleep apnea     no cpap    Snoring       Past Surgical History:   Procedure Laterality Date    INGUINAL HERNIA REPAIR ROBOTIC XI Right 2/22/2021    Procedure: REPAIR, HERNIA, INGUINAL, ROBOT-ASSISTED, USING DA GONZALO XI;  Surgeon: Ian Latham M.D.;  Location: SURGERY [facility];  Service: Gen Robotic    REPAIR, HERNIA, UMBILICAL, ROBOT-ASSISTED, USING DA TAYE  2/22/2021    Procedure: REPAIR,HERNIA,UMBILICAL,ROBOT-ASSISTED,USING DA GONZALO XI;  Surgeon: Ian Latham M.D.;  Location: SURGERY [facility];  Service: Gen Robotic     Family History   Problem Relation Age of Onset    Hypertension Father     Cancer Paternal Aunt         lung    Heart Disease Neg Hx     Diabetes Neg Hx     Stroke Neg Hx     Hyperlipidemia Neg Hx      Social History     Socioeconomic History    Marital status:      Spouse name: Not on file    Number of children: Not on file    Years of education: Not on file    Highest education level: 12th grade   Occupational History    Not on file   Tobacco Use    Smoking status: Former     Current packs/day: 0.50     Average packs/day: 0.5 packs/day for 20.2 years (10.1 ttl pk-yrs)     Types: Cigars, Cigarettes     Start date:  2/28/2018    Smokeless tobacco: Former     Types: Chew     Quit date: 1/1/2017    Tobacco comments:     1 can qod as of 2/21 uses nicotine pouches   Vaping Use    Vaping status: Former   Substance and Sexual Activity    Alcohol use: Yes     Alcohol/week: 3.6 oz     Types: 6 Cans of beer per week     Comment: 6 pack per week    Drug use: Yes     Frequency: 7.0 times per week     Types: Marijuana, Inhaled     Comment: vape marijuana- denies h/o heroin, cocaine; h/o meth x 4, smoke, quit around 2000.    Sexual activity: Yes     Partners: Female     Comment:  / Wife    Other Topics Concern    Not on file   Social History Narrative    Not on file     Social Determinants of Health     Financial Resource Strain: Low Risk  (3/24/2024)    Overall Financial Resource Strain (CARDIA)     Difficulty of Paying Living Expenses: Not very hard   Food Insecurity: No Food Insecurity (3/24/2024)    Hunger Vital Sign     Worried About Running Out of Food in the Last Year: Never true     Ran Out of Food in the Last Year: Never true   Transportation Needs: No Transportation Needs (3/24/2024)    PRAPARE - Transportation     Lack of Transportation (Medical): No     Lack of Transportation (Non-Medical): No   Physical Activity: Sufficiently Active (3/24/2024)    Exercise Vital Sign     Days of Exercise per Week: 6 days     Minutes of Exercise per Session: 120 min   Stress: Stress Concern Present (3/24/2024)    [place] [employer] - Occupational Stress Questionnaire     Feeling of Stress : Very much   Social Connections: Moderately Isolated (3/24/2024)    Social Connection and Isolation Panel [NHANES]     Frequency of Communication with Friends and Family: Once a week     Frequency of Social Gatherings with Friends and Family: Once a week     Attends Gnosticist Services: 1 to 4 times per year     Active Member of Clubs or Organizations: No     Attends Club or Organization Meetings: Never     Marital Status:   "  Intimate Partner Violence: Not on file   Housing Stability: Low Risk  (3/24/2024)    Housing Stability Vital Sign     Unable to Pay for Housing in the Last Year: No     Number of Places Lived in the Last Year: 1     Unstable Housing in the Last Year: No     Current Outpatient Medications   Medication Sig Dispense Refill    Testosterone (ANDROGEL) 20.25 MG/ACT (1.62%) Gel Place 20.25 mg on the skin every day for 90 days. 75 g 0    fenofibrate (TRICOR) 145 MG Tab Take 1 Tablet by mouth every day. 90 Tablet 3    sertraline (ZOLOFT) 25 MG tablet Take 1 Tablet by mouth every day. 30 Tablet 11     No current facility-administered medications for this visit.      ALLERGIES: Patient has no known allergies.    ROS:  Constitutional: Denies fever, chills, sweats,  weight loss, fatigue  Cardiovascular: Denies chest pain, tightness, palpitations, swelling in legs/feet  Respiratory: Denies shortness of breath, cough, sputum, wheezing, painful breathing   Sleep: per HPI  Gastrointestinal: Denies  difficulty swallowing, nausea, abdominal pain, diarrhea, constipation, heartburn.  Musculoskeletal: Denies painful joints, sore muscles,       PHYSICAL EXAM:  /70 (BP Location: Right arm, Patient Position: Sitting, BP Cuff Size: Adult)   Pulse 92   Ht 1.778 m (5' 10\")   Wt 112 kg (247 lb)   SpO2 97%   BMI 35.44 kg/m²   Appearance: Well-nourished, well-developed, no acute distress  Eyes:  No scleral icterus , EOMI  ENMT: No redness of the oropharynx  Lung auscultation:  No wheezes rhonchi rubs or rales  Cardiac: No murmurs, rubs, or gallops; regular rhythm, normal rate; no edema  Musculoskeletal:  Grossly normal; gait and station normal; digits and nails normal  Skin:  No rashes, petechiae, cyanosis  Neurologic: without focal signs; oriented to person, time, place, and purpose; judgement intact  Psychiatric:  No depression, anxiety, agitation  Mallampati score: Class III    Assessment and Plan:    The medical record was " reviewed.    Diagnostic and titration nocturnal polysomnogram's, home sleep apnea tests, continuous nocturnal oximetry results, multiple sleep latency tests, and compliance reports reviewed.    Problem List Items Addressed This Visit          Pulmonary/Sleep Medicine Problems    TOMER on CPAP     Sleep Apnea:    The pathophysiology of sleep anea and the increased risk of cardiovascular morbidity from untreated sleep apnea is discussed in detail with the patient.  Urged to avoid supine sleep, weight gain and alcoholic beverages since all of these can worsen sleep apnea. Cautioned against drowsy driving. If feeling sleepy while driving, pull over for a break/nap, rather than persist on the road, in the interest of own safety and that of others on the road.    Compliance download was reviewed and discussed with the patient.  Patient is compliant and well-controlled.  We will send the order for a new CPAP machine to [facility].    - Order placed for CPAP @ 7 CWP to [facility]  - Compliance was reinforced  - Recommended the patient against the use of Ozone , such as SoClean  - Clean supplies a couple times a week with dish soap and water and air dry  - Recommended the patient change out supplies as recommended for best mask fit and usage of the machine  - Advised patient to reach out via [employer] if any questions or concerns should arise.   - Equipment replacement schedule:  Mask cushion every month  Nasal pillows 2 times per month  Mask every 6 months  Head gear every 6 months  Tubing every 3 months  Ultra-fine filters 2 times per month  Foam filter every 6 months  Humidifier chamber every 6 months  Chin strap every 6 months    Has been advised to continue the current CPAP, clean equipment frequently, and get new mask and supplies as allowed by insurance and DME. Recommend an earlier appointment, if significant treatment barriers develop.    The risks of untreated sleep apnea were discussed with the patient at  length. Patients with sleep apnea are at increased risk of cardiovascular disease including coronary artery disease, systemic arterial hypertension, pulmonary arterial hypertension, cardiac arrythmias, and stroke. The patient was advised to avoid driving a motor vehicle when drowsy.    Positive airway pressure will favorably impact many of the adverse conditions and effects provoked by sleep apnea.           Relevant Orders    DME CPAP       Other    Insomnia     He states he is having significant issues with insomnia and is having resulting brain fog and issues concentrating with some daytime drowsiness.  Patient states that he will take 2-3 hours to fall asleep and reports that his mind will start to wander/race.  -- Referral to Clarisa Haider for CBT-I therapy  -- Resources provided to patient on CBT-I therapy all  -- Advised patient to try REM fresh 2-10 mg as needed         Relevant Orders    Referral to Behavioral Health     Have advised the patient to follow up with the appropriate healthcare practitioners for all other medical problems and issues.    Return in about 3 months (around 8/23/2024), or if symptoms worsen or fail to improve, for 1st compliance, with Sofy.    Please note portions of this record was created using voice recognition software. I have made every reasonable attempt to correct obvious errors, but I expect that there are errors of grammar and possibly content I did not discover before finalizing the note.    Time spent in record review prior to patient arrival, reviewing results, and in face-to-face encounter totaled 20 min.  __________  CARLO Arteaga  Pulmonary & Sleep Medicine  [facility]

## 2024-05-23 ENCOUNTER — OFFICE VISIT (OUTPATIENT)
Dept: SLEEP MEDICINE | Facility: MEDICAL CENTER | Age: 44
End: 2024-05-23
Attending: FAMILY MEDICINE
Payer: COMMERCIAL

## 2024-05-23 VITALS
SYSTOLIC BLOOD PRESSURE: 116 MMHG | DIASTOLIC BLOOD PRESSURE: 70 MMHG | OXYGEN SATURATION: 97 % | WEIGHT: 247 LBS | HEART RATE: 92 BPM | HEIGHT: 70 IN | BODY MASS INDEX: 35.36 KG/M2

## 2024-05-23 DIAGNOSIS — G47.33 OSA ON CPAP: ICD-10-CM

## 2024-05-23 DIAGNOSIS — F51.01 PRIMARY INSOMNIA: ICD-10-CM

## 2024-05-23 PROCEDURE — 99213 OFFICE O/P EST LOW 20 MIN: CPT

## 2024-05-23 PROCEDURE — 3074F SYST BP LT 130 MM HG: CPT

## 2024-05-23 PROCEDURE — 3078F DIAST BP <80 MM HG: CPT

## 2024-05-23 ASSESSMENT — FIBROSIS 4 INDEX: FIB4 SCORE: 0.68

## 2024-05-23 NOTE — PATIENT INSTRUCTIONS
REM Fresh 2-10 mg     Please make sure that you are seen within 90 days of receiving your machine, with at least 30 days of use.  Please call scheduling at [phone] if your appointment needs to be moved to meet these parameters.     To meet compliance requirements for insurance please ensure that you use the machine at least 6/7 days a week for at least 4 or more hours a night.    I advise patients to research different mask options before picking up the new machine.  You should also ask what the [employer] company's return policy is for the mask because if you do not like the mask and don't return it within that time frame, you will have to wait 6 months for insurance to cover another mask.     Please bring your entire machine and chip to your first appointment, regardless if the machine is set up for wireless access.    Please do not return the machine without discussing any issues with a sleep provider, as you would be forfeiting therapy and would have to restart the testing process over.           It was a pleasure meeting you today. Here are a list of resources for self-guided CTBI.    CBT-I Books  Kristie Mind: Turn Off Your Noisy Thoughts and Get a Good Night's Sleep - Jen Dolan, Phd and Yumiko Harrell, Phd  Accessible, enjoyable, and grounded in evidence-based cognitive behavioral therapy (CBT), Kristie Mind directly addresses the effects of rumination?or having an overactive brain?on your ability to sleep well. Written by two psychologists who specialize in sleep disorders, the book contains helpful exercises and insights into how you can better manage your thoughts at bedtime, and finally get some sleep.    Insomnia Solved: A Self-Directed Cognitive Behavioral Therapy for Insomnia (CBTI) Program - Rolo Boswell MD  Cognitive behavioral therapy for insomnia (CBTI) is often structured as a 6-week treatment program that can help people who have difficulty falling asleep, staying asleep, or find that  "sleep is unrefreshing. CBTI is scientifically proven, highly effective, and does not rely on medications. CBTI has life-long benefits and most participants report improved sleep satisfaction. Insomnia Solved is based on the core features of this treatment.    Quiet Your Mind and Get to Sleep: Solutions to Insomnia for Those with Depression, Anxiety or Chronic Pain - Yumiko Harrell, PhD  This workbook uses cognitive behavior therapy, which has been shown to work as well as sleep medications and produce longer-lasting effects. Research shows that it also works well for those whose insomnia is experienced in the context of anxiety, depression, and chronic pain. The complete program in this book goes to the root of your insomnia and offers the same techniques used by experienced sleep specialists.    \"Say Kristie to Insomnia\" by Garo Briones     \"No More Sleepless Nights\" by Jones Gipson    CBT-I Online Resources  Path to Better Sleep (free) - https://www.veterantraining.va.gov/insomnia/index.asp   This free online Cognitive Behavioral Therapy for Insomnia course, which was developed for veterans, takes you through a sleep \"check-in\" and the various components of CBT-I, including modifying unhelpful behaviors and unhelpful thoughts around sleep.    Insomnia Solved - Sylvain Boswell MD ($79) - http://www.sylvainEdgar Online.EnWave/fix-my-insomnia/   Insomnia Solved is a self-guided CBTI program created by Sylvain Boswell M.D., a board-certified medical doctor, that is priced inexpensively at $79. The complete program includes full access to exclusive multimedia content, including the 154-page eBook and online modules and audiofiles.    Lulu*s Fashion Lounge - Leyla Woodward, PhD, [facility] ($129) - https://Glofox/   Sleepfitness is an insomnia program based on Cognitive Behavioral Treatment for Insomnia (CBTi) and is a 6-week self-guided online format. IT costs $219 for a 1-year subscription. You can sign up for the 7-day free " trial and learn how CBTi can improve your sleep.    Apps  Insomnia  (free)   Offers a self-guided 5-week training plan designed to change sleep habits.  https://[employer] GmbH.va.gov/cherie/insomnia-

## 2024-05-23 NOTE — ASSESSMENT & PLAN NOTE
Sleep Apnea:    The pathophysiology of sleep anea and the increased risk of cardiovascular morbidity from untreated sleep apnea is discussed in detail with the patient.  Urged to avoid supine sleep, weight gain and alcoholic beverages since all of these can worsen sleep apnea. Cautioned against drowsy driving. If feeling sleepy while driving, pull over for a break/nap, rather than persist on the road, in the interest of own safety and that of others on the road.    Compliance download was reviewed and discussed with the patient.  Patient is compliant and well-controlled.  We will send the order for a new CPAP machine to [facility].    - Order placed for CPAP @ 7 CWP to [facility]  - Compliance was reinforced  - Recommended the patient against the use of Ozone , such as SoClean  - Clean supplies a couple times a week with dish soap and water and air dry  - Recommended the patient change out supplies as recommended for best mask fit and usage of the machine  - Advised patient to reach out via "[employer],"hart if any questions or concerns should arise.   - Equipment replacement schedule:  Mask cushion every month  Nasal pillows 2 times per month  Mask every 6 months  Head gear every 6 months  Tubing every 3 months  Ultra-fine filters 2 times per month  Foam filter every 6 months  Humidifier chamber every 6 months  Chin strap every 6 months    Has been advised to continue the current CPAP, clean equipment frequently, and get new mask and supplies as allowed by insurance and DME. Recommend an earlier appointment, if significant treatment barriers develop.    The risks of untreated sleep apnea were discussed with the patient at length. Patients with sleep apnea are at increased risk of cardiovascular disease including coronary artery disease, systemic arterial hypertension, pulmonary arterial hypertension, cardiac arrythmias, and stroke. The patient was advised to avoid driving a motor vehicle when drowsy.    Positive  airway pressure will favorably impact many of the adverse conditions and effects provoked by sleep apnea.

## 2024-05-23 NOTE — ASSESSMENT & PLAN NOTE
He states he is having significant issues with insomnia and is having resulting brain fog and issues concentrating with some daytime drowsiness.  Patient states that he will take 2-3 hours to fall asleep and reports that his mind will start to wander/race.  -- Referral to Clarisa Haider for CBT-I therapy  -- Resources provided to patient on CBT-I therapy all  -- Advised patient to try REM fresh 2-10 mg as needed

## 2024-05-28 ENCOUNTER — PATIENT MESSAGE (OUTPATIENT)
Dept: MEDICAL GROUP | Facility: PHYSICIAN GROUP | Age: 44
End: 2024-05-28
Payer: COMMERCIAL

## 2024-05-28 DIAGNOSIS — E29.1 HYPOGONADISM IN MALE: ICD-10-CM

## 2024-05-29 RX ORDER — TESTOSTERONE 16.2 MG/G
GEL TRANSDERMAL
Qty: 88 G | Refills: 1 | Status: SHIPPED | OUTPATIENT
Start: 2024-05-29 | End: 2024-08-28

## 2024-06-23 ENCOUNTER — APPOINTMENT (OUTPATIENT)
Dept: RADIOLOGY | Facility: MEDICAL CENTER | Age: 44
End: 2024-06-23
Attending: FAMILY MEDICINE
Payer: COMMERCIAL

## 2025-01-01 ENCOUNTER — OFFICE VISIT (OUTPATIENT)
Dept: URGENT CARE | Facility: PHYSICIAN GROUP | Age: 45
End: 2025-01-01
Payer: COMMERCIAL

## 2025-01-01 VITALS
TEMPERATURE: 97.5 F | DIASTOLIC BLOOD PRESSURE: 78 MMHG | HEIGHT: 70 IN | OXYGEN SATURATION: 98 % | SYSTOLIC BLOOD PRESSURE: 118 MMHG | WEIGHT: 251 LBS | HEART RATE: 135 BPM | BODY MASS INDEX: 35.93 KG/M2 | RESPIRATION RATE: 16 BRPM

## 2025-01-01 DIAGNOSIS — R10.31 RIGHT LOWER QUADRANT ABDOMINAL PAIN: ICD-10-CM

## 2025-01-01 LAB
APPEARANCE UR: CLEAR
BILIRUB UR STRIP-MCNC: NORMAL MG/DL
COLOR UR AUTO: NORMAL
GLUCOSE UR STRIP.AUTO-MCNC: NORMAL MG/DL
KETONES UR STRIP.AUTO-MCNC: NORMAL MG/DL
LEUKOCYTE ESTERASE UR QL STRIP.AUTO: NORMAL
NITRITE UR QL STRIP.AUTO: NORMAL
PH UR STRIP.AUTO: 6 [PH] (ref 5–8)
PROT UR QL STRIP: 100 MG/DL
RBC UR QL AUTO: NORMAL
SP GR UR STRIP.AUTO: 1.02
UROBILINOGEN UR STRIP-MCNC: 0.2 MG/DL

## 2025-01-01 PROCEDURE — 99213 OFFICE O/P EST LOW 20 MIN: CPT | Performed by: FAMILY MEDICINE

## 2025-01-01 PROCEDURE — 81002 URINALYSIS NONAUTO W/O SCOPE: CPT | Performed by: FAMILY MEDICINE

## 2025-01-01 PROCEDURE — 3078F DIAST BP <80 MM HG: CPT | Performed by: FAMILY MEDICINE

## 2025-01-01 PROCEDURE — 3074F SYST BP LT 130 MM HG: CPT | Performed by: FAMILY MEDICINE

## 2025-01-01 ASSESSMENT — FIBROSIS 4 INDEX: FIB4 SCORE: 0.69

## 2025-01-01 NOTE — PROGRESS NOTES
Subjective:     Chief Complaint   Patient presents with    Flu Like Symptoms     Note for work     Headache     Sx 3 days     Emesis          Abdominal Pain  This is a new problem. The current episode started today. The onset quality is sudden. The problem occurs constantly. The pain is unchanged. The pain is located in the RLQ  region. The pain is mild. The quality of the pain is described as aching. The pain does not radiate. Associated symptoms include :   fever, n/v x 1. Pertinent negatives include no belching, constipation, diarrhea, dysuria, fever, hematochezia, hematuria, nausea or sore throat. Nothing relieves the symptoms. Past treatments include nothing.     Social History     Tobacco Use    Smoking status: Former     Current packs/day: 0.50     Average packs/day: 0.5 packs/day for 20.8 years (10.4 ttl pk-yrs)     Types: Cigars, Cigarettes     Start date: 2/28/2018    Smokeless tobacco: Former     Types: Chew     Quit date: 1/1/2017    Tobacco comments:     1 can qod as of 2/21 uses nicotine pouches   Vaping Use    Vaping status: Former   Substance Use Topics    Alcohol use: Yes     Alcohol/week: 3.6 oz     Types: 6 Cans of beer per week     Comment: 6 pack per week    Drug use: Yes     Frequency: 7.0 times per week     Types: Marijuana, Inhaled     Comment: vape marijuana- denies h/o heroin, cocaine; h/o meth x 4, smoke, quit around 2000.           Current Outpatient Medications on File Prior to Visit   Medication Sig Dispense Refill    fenofibrate (TRICOR) 145 MG Tab Take 1 Tablet by mouth every day. (Patient not taking: Reported on 1/1/2025) 90 Tablet 3    sertraline (ZOLOFT) 25 MG tablet Take 1 Tablet by mouth every day. (Patient not taking: Reported on 1/1/2025) 30 Tablet 11     No current facility-administered medications on file prior to visit.       Family History   Problem Relation Age of Onset    Hypertension Father     Cancer Paternal Aunt         lung    Heart Disease Neg Hx     Diabetes Neg Hx  "    Stroke Neg Hx     Hyperlipidemia Neg Hx          No Known Allergies      Review of Systems   Constitutional: Negative for fever.   HENT: Negative for sore throat.    Gastrointestinal: Positive for abdominal pain. Negative for nausea, diarrhea, constipation and hematochezia.   Genitourinary: Negative for dysuria and hematuria.   Neurological: denies dizziness, confusion, disorientation.   No extremity weakness or numbness  All other systems reviewed and are negative.         Objective:     /78   Pulse (!) 135   Temp 36.4 °C (97.5 °F) (Temporal)   Resp 16   Ht 1.778 m (5' 10\")   Wt 114 kg (251 lb)   SpO2 98%       Physical Exam   Constitutional: pt appears well-developed. No distress.   HENT:   Nose: No nasal discharge.   Mouth/Throat: Mucous membranes are moist. Oropharynx is clear.   Eyes: Conjunctivae and EOM are normal. Pupils are equal, round, and reactive to light. Right eye exhibits no discharge. Left eye exhibits no discharge.   Neck: Neck supple.   Cardiovascular: Normal rate, regular rhythm, S1 normal and S2 normal.    Pulmonary/Chest: Effort normal and breath sounds normal. There is normal air entry. No respiratory distress.   Abdominal: Soft. There is tenderness in the periumbilical area. bowel sounds are present.   No liver or spleen enlargement .  No rebound and no guarding.   There is no pain over McBurney's point  Lymphadenopathy:     Pt has no  adenopathy.   Neurological: pt is alert and orientated x3 . No cranial nerve deficit.   Skin: Skin is warm and moist. No petechiae and no rash noted.   not diaphoretic. No jaundice.   Nursing note and vitals reviewed.    No results found for: \"POCCOLOR\", \"POCAPPEAR\", \"POCLEUKEST\", \"POCNITRITE\", \"POCUROBILIGE\", \"POCPROTEIN\", \"POCURPH\", \"POCBLOOD\", \"POCSPGRV\", \"POCKETONES\", \"POCBILIRUBIN\", \"POCGLUCUA\"             Assessment/Plan:         1. Right lower quadrant abdominal pain  UA unremarkable.     I am concerned for appendicitis      Pt refused tx " to ED    Pt scheduled for CT on 1/2 @ 1100      Will call with results      Differential diagnosis, natural history, supportive care, and indications for immediate follow-up discussed. All questions answered. Patient agrees with the plan of care.     Follow-up as needed if symptoms worsen or fail to improve to PCP, Urgent care or Emergency Room.     I have personally reviewed prior external notes and test results pertinent to today's visit.  I have independently reviewed and interpreted all diagnostics ordered during this urgent care acute visit.     - CT-ABDOMEN-PELVIS WITH; Future  - CoV-2, Flu A/B, And RSV by PCR ([employer]); Future  - POCT Urinalysis

## 2025-01-01 NOTE — LETTER
January 1, 2025    To Whom It May Concern:         This is confirmation that Braden Hill attended his scheduled appointment with Pete Sky M.D. on 1/01/25.    Please excuse absence due to illness for 1/2-1/3.         If you have any questions please do not hesitate to call me at the phone number listed below.    Sincerely,          Pete Sky M.D.  [phone]

## 2025-01-02 ENCOUNTER — APPOINTMENT (OUTPATIENT)
Dept: RADIOLOGY | Facility: MEDICAL CENTER | Age: 45
DRG: 399 | End: 2025-01-02
Attending: EMERGENCY MEDICINE
Payer: COMMERCIAL

## 2025-01-02 ENCOUNTER — HOSPITAL ENCOUNTER (OUTPATIENT)
Dept: RADIOLOGY | Facility: MEDICAL CENTER | Age: 45
End: 2025-01-02
Attending: FAMILY MEDICINE
Payer: COMMERCIAL

## 2025-01-02 ENCOUNTER — HOSPITAL ENCOUNTER (INPATIENT)
Facility: MEDICAL CENTER | Age: 45
LOS: 3 days | DRG: 399 | End: 2025-01-05
Attending: EMERGENCY MEDICINE | Admitting: STUDENT IN AN ORGANIZED HEALTH CARE EDUCATION/TRAINING PROGRAM
Payer: COMMERCIAL

## 2025-01-02 ENCOUNTER — ANESTHESIA EVENT (OUTPATIENT)
Dept: SURGERY | Facility: MEDICAL CENTER | Age: 45
DRG: 399 | End: 2025-01-02
Payer: COMMERCIAL

## 2025-01-02 ENCOUNTER — ANESTHESIA (OUTPATIENT)
Dept: SURGERY | Facility: MEDICAL CENTER | Age: 45
DRG: 399 | End: 2025-01-02
Payer: COMMERCIAL

## 2025-01-02 DIAGNOSIS — R10.31 RIGHT LOWER QUADRANT ABDOMINAL PAIN: ICD-10-CM

## 2025-01-02 DIAGNOSIS — K35.32 PERFORATED APPENDICITIS: ICD-10-CM

## 2025-01-02 DIAGNOSIS — K35.32 ACUTE APPENDICITIS WITH PERFORATION AND LOCALIZED PERITONITIS, WITHOUT ABSCESS OR GANGRENE: ICD-10-CM

## 2025-01-02 PROBLEM — K35.80 APPENDICITIS, ACUTE: Status: ACTIVE | Noted: 2025-01-02

## 2025-01-02 LAB
ALBUMIN SERPL BCP-MCNC: 4.4 G/DL (ref 3.2–4.9)
ALBUMIN/GLOB SERPL: 1.3 G/DL
ALP SERPL-CCNC: 107 U/L (ref 30–99)
ALT SERPL-CCNC: 37 U/L (ref 2–50)
ANION GAP SERPL CALC-SCNC: 15 MMOL/L (ref 7–16)
AST SERPL-CCNC: 23 U/L (ref 12–45)
BASOPHILS # BLD AUTO: 0.4 % (ref 0–1.8)
BASOPHILS # BLD: 0.05 K/UL (ref 0–0.12)
BILIRUB SERPL-MCNC: 0.5 MG/DL (ref 0.1–1.5)
BUN SERPL-MCNC: 17 MG/DL (ref 8–22)
CALCIUM ALBUM COR SERPL-MCNC: 8.9 MG/DL (ref 8.5–10.5)
CALCIUM SERPL-MCNC: 9.2 MG/DL (ref 8.5–10.5)
CHLORIDE SERPL-SCNC: 96 MMOL/L (ref 96–112)
CO2 SERPL-SCNC: 22 MMOL/L (ref 20–33)
CREAT SERPL-MCNC: 1.01 MG/DL (ref 0.5–1.4)
EKG IMPRESSION: NORMAL
EOSINOPHIL # BLD AUTO: 0.05 K/UL (ref 0–0.51)
EOSINOPHIL NFR BLD: 0.4 % (ref 0–6.9)
ERYTHROCYTE [DISTWIDTH] IN BLOOD BY AUTOMATED COUNT: 38.8 FL (ref 35.9–50)
GFR SERPLBLD CREATININE-BSD FMLA CKD-EPI: 93 ML/MIN/1.73 M 2
GLOBULIN SER CALC-MCNC: 3.4 G/DL (ref 1.9–3.5)
GLUCOSE SERPL-MCNC: 124 MG/DL (ref 65–99)
HCT VFR BLD AUTO: 44.8 % (ref 42–52)
HGB BLD-MCNC: 15.8 G/DL (ref 14–18)
IMM GRANULOCYTES # BLD AUTO: 0.04 K/UL (ref 0–0.11)
IMM GRANULOCYTES NFR BLD AUTO: 0.3 % (ref 0–0.9)
LIPASE SERPL-CCNC: 29 U/L (ref 11–82)
LYMPHOCYTES # BLD AUTO: 1.66 K/UL (ref 1–4.8)
LYMPHOCYTES NFR BLD: 14.1 % (ref 22–41)
MCH RBC QN AUTO: 30.9 PG (ref 27–33)
MCHC RBC AUTO-ENTMCNC: 35.3 G/DL (ref 32.3–36.5)
MCV RBC AUTO: 87.5 FL (ref 81.4–97.8)
MONOCYTES # BLD AUTO: 1.02 K/UL (ref 0–0.85)
MONOCYTES NFR BLD AUTO: 8.7 % (ref 0–13.4)
NEUTROPHILS # BLD AUTO: 8.94 K/UL (ref 1.82–7.42)
NEUTROPHILS NFR BLD: 76.1 % (ref 44–72)
NRBC # BLD AUTO: 0 K/UL
NRBC BLD-RTO: 0 /100 WBC (ref 0–0.2)
PLATELET # BLD AUTO: 271 K/UL (ref 164–446)
PMV BLD AUTO: 9.3 FL (ref 9–12.9)
POTASSIUM SERPL-SCNC: 3.5 MMOL/L (ref 3.6–5.5)
PROT SERPL-MCNC: 7.8 G/DL (ref 6–8.2)
RBC # BLD AUTO: 5.12 M/UL (ref 4.7–6.1)
SODIUM SERPL-SCNC: 133 MMOL/L (ref 135–145)
WBC # BLD AUTO: 11.8 K/UL (ref 4.8–10.8)

## 2025-01-02 PROCEDURE — 700101 HCHG RX REV CODE 250: Performed by: STUDENT IN AN ORGANIZED HEALTH CARE EDUCATION/TRAINING PROGRAM

## 2025-01-02 PROCEDURE — 700102 HCHG RX REV CODE 250 W/ 637 OVERRIDE(OP): Performed by: STUDENT IN AN ORGANIZED HEALTH CARE EDUCATION/TRAINING PROGRAM

## 2025-01-02 PROCEDURE — 80053 COMPREHEN METABOLIC PANEL: CPT

## 2025-01-02 PROCEDURE — 96365 THER/PROPH/DIAG IV INF INIT: CPT

## 2025-01-02 PROCEDURE — 700111 HCHG RX REV CODE 636 W/ 250 OVERRIDE (IP): Mod: JZ | Performed by: EMERGENCY MEDICINE

## 2025-01-02 PROCEDURE — 160002 HCHG RECOVERY MINUTES (STAT): Performed by: SURGERY

## 2025-01-02 PROCEDURE — 83690 ASSAY OF LIPASE: CPT

## 2025-01-02 PROCEDURE — 44970 LAPAROSCOPY APPENDECTOMY: CPT | Mod: AS

## 2025-01-02 PROCEDURE — A9270 NON-COVERED ITEM OR SERVICE: HCPCS | Performed by: SURGERY

## 2025-01-02 PROCEDURE — 700102 HCHG RX REV CODE 250 W/ 637 OVERRIDE(OP): Performed by: SURGERY

## 2025-01-02 PROCEDURE — 160029 HCHG SURGERY MINUTES - 1ST 30 MINS LEVEL 4: Performed by: SURGERY

## 2025-01-02 PROCEDURE — 160041 HCHG SURGERY MINUTES - EA ADDL 1 MIN LEVEL 4: Performed by: SURGERY

## 2025-01-02 PROCEDURE — 700111 HCHG RX REV CODE 636 W/ 250 OVERRIDE (IP): Mod: JZ | Performed by: STUDENT IN AN ORGANIZED HEALTH CARE EDUCATION/TRAINING PROGRAM

## 2025-01-02 PROCEDURE — 88304 TISSUE EXAM BY PATHOLOGIST: CPT

## 2025-01-02 PROCEDURE — A9270 NON-COVERED ITEM OR SERVICE: HCPCS | Performed by: STUDENT IN AN ORGANIZED HEALTH CARE EDUCATION/TRAINING PROGRAM

## 2025-01-02 PROCEDURE — 700117 HCHG RX CONTRAST REV CODE 255: Performed by: FAMILY MEDICINE

## 2025-01-02 PROCEDURE — 700105 HCHG RX REV CODE 258: Performed by: SURGERY

## 2025-01-02 PROCEDURE — 700105 HCHG RX REV CODE 258: Performed by: EMERGENCY MEDICINE

## 2025-01-02 PROCEDURE — 96375 TX/PRO/DX INJ NEW DRUG ADDON: CPT

## 2025-01-02 PROCEDURE — 160036 HCHG PACU - EA ADDL 30 MINS PHASE I: Performed by: SURGERY

## 2025-01-02 PROCEDURE — 700105 HCHG RX REV CODE 258: Performed by: STUDENT IN AN ORGANIZED HEALTH CARE EDUCATION/TRAINING PROGRAM

## 2025-01-02 PROCEDURE — 36415 COLL VENOUS BLD VENIPUNCTURE: CPT

## 2025-01-02 PROCEDURE — 99291 CRITICAL CARE FIRST HOUR: CPT

## 2025-01-02 PROCEDURE — 700111 HCHG RX REV CODE 636 W/ 250 OVERRIDE (IP): Performed by: SURGERY

## 2025-01-02 PROCEDURE — 160009 HCHG ANES TIME/MIN: Performed by: SURGERY

## 2025-01-02 PROCEDURE — 87040 BLOOD CULTURE FOR BACTERIA: CPT

## 2025-01-02 PROCEDURE — 74177 CT ABD & PELVIS W/CONTRAST: CPT

## 2025-01-02 PROCEDURE — 99222 1ST HOSP IP/OBS MODERATE 55: CPT | Mod: 57,AI | Performed by: STUDENT IN AN ORGANIZED HEALTH CARE EDUCATION/TRAINING PROGRAM

## 2025-01-02 PROCEDURE — 160048 HCHG OR STATISTICAL LEVEL 1-5: Performed by: SURGERY

## 2025-01-02 PROCEDURE — 160035 HCHG PACU - 1ST 60 MINS PHASE I: Performed by: SURGERY

## 2025-01-02 PROCEDURE — 0DTJ4ZZ RESECTION OF APPENDIX, PERCUTANEOUS ENDOSCOPIC APPROACH: ICD-10-PCS | Performed by: SURGERY

## 2025-01-02 PROCEDURE — 93005 ELECTROCARDIOGRAM TRACING: CPT | Mod: TC | Performed by: SURGERY

## 2025-01-02 PROCEDURE — 85025 COMPLETE CBC W/AUTO DIFF WBC: CPT

## 2025-01-02 PROCEDURE — 770001 HCHG ROOM/CARE - MED/SURG/GYN PRIV*

## 2025-01-02 PROCEDURE — 44970 LAPAROSCOPY APPENDECTOMY: CPT | Performed by: SURGERY

## 2025-01-02 PROCEDURE — 71045 X-RAY EXAM CHEST 1 VIEW: CPT

## 2025-01-02 RX ORDER — BISACODYL 10 MG
10 SUPPOSITORY, RECTAL RECTAL
Status: DISCONTINUED | OUTPATIENT
Start: 2025-01-02 | End: 2025-01-05 | Stop reason: HOSPADM

## 2025-01-02 RX ORDER — KETOROLAC TROMETHAMINE 15 MG/ML
INJECTION, SOLUTION INTRAMUSCULAR; INTRAVENOUS PRN
Status: DISCONTINUED | OUTPATIENT
Start: 2025-01-02 | End: 2025-01-02 | Stop reason: SURG

## 2025-01-02 RX ORDER — MORPHINE SULFATE 4 MG/ML
4 INJECTION INTRAVENOUS ONCE
Status: COMPLETED | OUTPATIENT
Start: 2025-01-02 | End: 2025-01-02

## 2025-01-02 RX ORDER — SUCCINYLCHOLINE CHLORIDE 20 MG/ML
INJECTION INTRAMUSCULAR; INTRAVENOUS PRN
Status: DISCONTINUED | OUTPATIENT
Start: 2025-01-02 | End: 2025-01-02 | Stop reason: SURG

## 2025-01-02 RX ORDER — ONDANSETRON 2 MG/ML
4 INJECTION INTRAMUSCULAR; INTRAVENOUS
Status: COMPLETED | OUTPATIENT
Start: 2025-01-02 | End: 2025-01-02

## 2025-01-02 RX ORDER — SODIUM CHLORIDE, SODIUM LACTATE, POTASSIUM CHLORIDE, CALCIUM CHLORIDE 600; 310; 30; 20 MG/100ML; MG/100ML; MG/100ML; MG/100ML
INJECTION, SOLUTION INTRAVENOUS
Status: DISCONTINUED | OUTPATIENT
Start: 2025-01-02 | End: 2025-01-02 | Stop reason: SURG

## 2025-01-02 RX ORDER — HALOPERIDOL 5 MG/ML
1 INJECTION INTRAMUSCULAR
Status: DISCONTINUED | OUTPATIENT
Start: 2025-01-02 | End: 2025-01-02 | Stop reason: HOSPADM

## 2025-01-02 RX ORDER — DOCUSATE SODIUM 100 MG/1
100 CAPSULE, LIQUID FILLED ORAL 2 TIMES DAILY
Status: DISCONTINUED | OUTPATIENT
Start: 2025-01-02 | End: 2025-01-05 | Stop reason: HOSPADM

## 2025-01-02 RX ORDER — ROCURONIUM BROMIDE 10 MG/ML
INJECTION, SOLUTION INTRAVENOUS PRN
Status: DISCONTINUED | OUTPATIENT
Start: 2025-01-02 | End: 2025-01-02 | Stop reason: SURG

## 2025-01-02 RX ORDER — LABETALOL HYDROCHLORIDE 5 MG/ML
INJECTION, SOLUTION INTRAVENOUS PRN
Status: DISCONTINUED | OUTPATIENT
Start: 2025-01-02 | End: 2025-01-02 | Stop reason: SURG

## 2025-01-02 RX ORDER — ONDANSETRON 2 MG/ML
4 INJECTION INTRAMUSCULAR; INTRAVENOUS ONCE
Status: COMPLETED | OUTPATIENT
Start: 2025-01-02 | End: 2025-01-02

## 2025-01-02 RX ORDER — LIDOCAINE HYDROCHLORIDE 20 MG/ML
INJECTION, SOLUTION EPIDURAL; INFILTRATION; INTRACAUDAL; PERINEURAL PRN
Status: DISCONTINUED | OUTPATIENT
Start: 2025-01-02 | End: 2025-01-02 | Stop reason: SURG

## 2025-01-02 RX ORDER — ACETAMINOPHEN 500 MG
1000 TABLET ORAL EVERY 6 HOURS PRN
Status: DISCONTINUED | OUTPATIENT
Start: 2025-01-07 | End: 2025-01-05 | Stop reason: HOSPADM

## 2025-01-02 RX ORDER — HYDROMORPHONE HYDROCHLORIDE 1 MG/ML
0.1 INJECTION, SOLUTION INTRAMUSCULAR; INTRAVENOUS; SUBCUTANEOUS
Status: DISCONTINUED | OUTPATIENT
Start: 2025-01-02 | End: 2025-01-02 | Stop reason: HOSPADM

## 2025-01-02 RX ORDER — ONDANSETRON 2 MG/ML
INJECTION INTRAMUSCULAR; INTRAVENOUS PRN
Status: DISCONTINUED | OUTPATIENT
Start: 2025-01-02 | End: 2025-01-02 | Stop reason: SURG

## 2025-01-02 RX ORDER — HYDROMORPHONE HYDROCHLORIDE 1 MG/ML
0.4 INJECTION, SOLUTION INTRAMUSCULAR; INTRAVENOUS; SUBCUTANEOUS
Status: DISCONTINUED | OUTPATIENT
Start: 2025-01-02 | End: 2025-01-02 | Stop reason: HOSPADM

## 2025-01-02 RX ORDER — SODIUM CHLORIDE, SODIUM LACTATE, POTASSIUM CHLORIDE, CALCIUM CHLORIDE 600; 310; 30; 20 MG/100ML; MG/100ML; MG/100ML; MG/100ML
INJECTION, SOLUTION INTRAVENOUS CONTINUOUS
Status: DISCONTINUED | OUTPATIENT
Start: 2025-01-02 | End: 2025-01-04

## 2025-01-02 RX ORDER — IBUPROFEN 200 MG
800 TABLET ORAL 3 TIMES DAILY PRN
Status: DISCONTINUED | OUTPATIENT
Start: 2025-01-05 | End: 2025-01-05 | Stop reason: HOSPADM

## 2025-01-02 RX ORDER — CEFTRIAXONE 2 G/1
2000 INJECTION, POWDER, FOR SOLUTION INTRAMUSCULAR; INTRAVENOUS ONCE
Status: COMPLETED | OUTPATIENT
Start: 2025-01-02 | End: 2025-01-02

## 2025-01-02 RX ORDER — SODIUM CHLORIDE 9 MG/ML
1000 INJECTION, SOLUTION INTRAVENOUS ONCE
Status: COMPLETED | OUTPATIENT
Start: 2025-01-02 | End: 2025-01-03

## 2025-01-02 RX ORDER — HYDROMORPHONE HYDROCHLORIDE 1 MG/ML
0.2 INJECTION, SOLUTION INTRAMUSCULAR; INTRAVENOUS; SUBCUTANEOUS
Status: DISCONTINUED | OUTPATIENT
Start: 2025-01-02 | End: 2025-01-02 | Stop reason: HOSPADM

## 2025-01-02 RX ORDER — OXYCODONE HYDROCHLORIDE 10 MG/1
10 TABLET ORAL
Status: DISCONTINUED | OUTPATIENT
Start: 2025-01-02 | End: 2025-01-05 | Stop reason: HOSPADM

## 2025-01-02 RX ORDER — KETOROLAC TROMETHAMINE 15 MG/ML
15 INJECTION, SOLUTION INTRAMUSCULAR; INTRAVENOUS EVERY 6 HOURS
Status: DISCONTINUED | OUTPATIENT
Start: 2025-01-02 | End: 2025-01-05 | Stop reason: HOSPADM

## 2025-01-02 RX ORDER — HYDRALAZINE HYDROCHLORIDE 20 MG/ML
5 INJECTION INTRAMUSCULAR; INTRAVENOUS
Status: DISCONTINUED | OUTPATIENT
Start: 2025-01-02 | End: 2025-01-02 | Stop reason: HOSPADM

## 2025-01-02 RX ORDER — ONDANSETRON 4 MG/1
4 TABLET, ORALLY DISINTEGRATING ORAL EVERY 4 HOURS PRN
Status: DISCONTINUED | OUTPATIENT
Start: 2025-01-02 | End: 2025-01-05 | Stop reason: HOSPADM

## 2025-01-02 RX ORDER — OXYCODONE HCL 5 MG/5 ML
5 SOLUTION, ORAL ORAL
Status: COMPLETED | OUTPATIENT
Start: 2025-01-02 | End: 2025-01-02

## 2025-01-02 RX ORDER — AMOXICILLIN 250 MG
1 CAPSULE ORAL NIGHTLY
Status: DISCONTINUED | OUTPATIENT
Start: 2025-01-02 | End: 2025-01-05 | Stop reason: HOSPADM

## 2025-01-02 RX ORDER — HYDROMORPHONE HYDROCHLORIDE 2 MG/ML
INJECTION, SOLUTION INTRAMUSCULAR; INTRAVENOUS; SUBCUTANEOUS PRN
Status: DISCONTINUED | OUTPATIENT
Start: 2025-01-02 | End: 2025-01-02 | Stop reason: SURG

## 2025-01-02 RX ORDER — ONDANSETRON 2 MG/ML
4 INJECTION INTRAMUSCULAR; INTRAVENOUS EVERY 4 HOURS PRN
Status: DISCONTINUED | OUTPATIENT
Start: 2025-01-02 | End: 2025-01-05 | Stop reason: HOSPADM

## 2025-01-02 RX ORDER — DIPHENHYDRAMINE HYDROCHLORIDE 50 MG/ML
12.5 INJECTION INTRAMUSCULAR; INTRAVENOUS
Status: DISCONTINUED | OUTPATIENT
Start: 2025-01-02 | End: 2025-01-02 | Stop reason: HOSPADM

## 2025-01-02 RX ORDER — OXYCODONE HCL 5 MG/5 ML
10 SOLUTION, ORAL ORAL
Status: COMPLETED | OUTPATIENT
Start: 2025-01-02 | End: 2025-01-02

## 2025-01-02 RX ORDER — CEFOTETAN DISODIUM 2 G/20ML
INJECTION, POWDER, FOR SOLUTION INTRAMUSCULAR; INTRAVENOUS PRN
Status: DISCONTINUED | OUTPATIENT
Start: 2025-01-02 | End: 2025-01-02 | Stop reason: SURG

## 2025-01-02 RX ORDER — OXYCODONE HYDROCHLORIDE 5 MG/1
5 TABLET ORAL
Status: DISCONTINUED | OUTPATIENT
Start: 2025-01-02 | End: 2025-01-05 | Stop reason: HOSPADM

## 2025-01-02 RX ORDER — ALBUTEROL SULFATE 5 MG/ML
2.5 SOLUTION RESPIRATORY (INHALATION)
Status: DISCONTINUED | OUTPATIENT
Start: 2025-01-02 | End: 2025-01-02 | Stop reason: HOSPADM

## 2025-01-02 RX ORDER — METRONIDAZOLE 500 MG/100ML
500 INJECTION, SOLUTION INTRAVENOUS ONCE
Status: COMPLETED | OUTPATIENT
Start: 2025-01-02 | End: 2025-01-02

## 2025-01-02 RX ORDER — SODIUM CHLORIDE, SODIUM LACTATE, POTASSIUM CHLORIDE, CALCIUM CHLORIDE 600; 310; 30; 20 MG/100ML; MG/100ML; MG/100ML; MG/100ML
INJECTION, SOLUTION INTRAVENOUS CONTINUOUS
Status: DISCONTINUED | OUTPATIENT
Start: 2025-01-02 | End: 2025-01-02 | Stop reason: HOSPADM

## 2025-01-02 RX ORDER — ACETAMINOPHEN 500 MG
1000 TABLET ORAL EVERY 6 HOURS
Status: DISCONTINUED | OUTPATIENT
Start: 2025-01-02 | End: 2025-01-05 | Stop reason: HOSPADM

## 2025-01-02 RX ORDER — AMOXICILLIN 250 MG
1 CAPSULE ORAL
Status: DISCONTINUED | OUTPATIENT
Start: 2025-01-02 | End: 2025-01-05 | Stop reason: HOSPADM

## 2025-01-02 RX ORDER — LABETALOL HYDROCHLORIDE 5 MG/ML
5 INJECTION, SOLUTION INTRAVENOUS
Status: DISCONTINUED | OUTPATIENT
Start: 2025-01-02 | End: 2025-01-02 | Stop reason: HOSPADM

## 2025-01-02 RX ORDER — DEXAMETHASONE SODIUM PHOSPHATE 4 MG/ML
INJECTION, SOLUTION INTRA-ARTICULAR; INTRALESIONAL; INTRAMUSCULAR; INTRAVENOUS; SOFT TISSUE PRN
Status: DISCONTINUED | OUTPATIENT
Start: 2025-01-02 | End: 2025-01-02 | Stop reason: SURG

## 2025-01-02 RX ORDER — POLYETHYLENE GLYCOL 3350 17 G/17G
1 POWDER, FOR SOLUTION ORAL 2 TIMES DAILY
Status: DISCONTINUED | OUTPATIENT
Start: 2025-01-02 | End: 2025-01-05 | Stop reason: HOSPADM

## 2025-01-02 RX ORDER — BUPIVACAINE HYDROCHLORIDE 2.5 MG/ML
INJECTION, SOLUTION EPIDURAL; INFILTRATION; INTRACAUDAL
Status: DISCONTINUED | OUTPATIENT
Start: 2025-01-02 | End: 2025-01-02 | Stop reason: HOSPADM

## 2025-01-02 RX ADMIN — PROPOFOL 200 MG: 10 INJECTION, EMULSION INTRAVENOUS at 18:05

## 2025-01-02 RX ADMIN — HYDROMORPHONE HYDROCHLORIDE 1 MG: 2 INJECTION INTRAMUSCULAR; INTRAVENOUS; SUBCUTANEOUS at 18:10

## 2025-01-02 RX ADMIN — DOCUSATE SODIUM 100 MG: 100 CAPSULE, LIQUID FILLED ORAL at 23:18

## 2025-01-02 RX ADMIN — MAGNESIUM HYDROXIDE 30 ML: 1200 LIQUID ORAL at 23:18

## 2025-01-02 RX ADMIN — KETOROLAC TROMETHAMINE 15 MG: 15 INJECTION, SOLUTION INTRAMUSCULAR; INTRAVENOUS at 23:03

## 2025-01-02 RX ADMIN — ONDANSETRON 4 MG: 2 INJECTION INTRAMUSCULAR; INTRAVENOUS at 18:52

## 2025-01-02 RX ADMIN — CEFOTETAN DISODIUM 2 G: 2 INJECTION, POWDER, FOR SOLUTION INTRAMUSCULAR; INTRAVENOUS at 18:08

## 2025-01-02 RX ADMIN — ACETAMINOPHEN 1000 MG: 500 TABLET ORAL at 23:18

## 2025-01-02 RX ADMIN — ONDANSETRON 4 MG: 2 INJECTION INTRAMUSCULAR; INTRAVENOUS at 18:29

## 2025-01-02 RX ADMIN — LIDOCAINE HYDROCHLORIDE 100 MG: 20 INJECTION, SOLUTION EPIDURAL; INFILTRATION; INTRACAUDAL; PERINEURAL at 18:05

## 2025-01-02 RX ADMIN — KETOROLAC TROMETHAMINE 15 MG: 15 INJECTION, SOLUTION INTRAMUSCULAR; INTRAVENOUS at 18:36

## 2025-01-02 RX ADMIN — PIPERACILLIN AND TAZOBACTAM 4.5 G: 4; .5 INJECTION, POWDER, FOR SOLUTION INTRAVENOUS at 23:14

## 2025-01-02 RX ADMIN — HYDROMORPHONE HYDROCHLORIDE 0.5 MG: 2 INJECTION INTRAMUSCULAR; INTRAVENOUS; SUBCUTANEOUS at 18:34

## 2025-01-02 RX ADMIN — SODIUM CHLORIDE 1000 ML: 9 INJECTION, SOLUTION INTRAVENOUS at 14:40

## 2025-01-02 RX ADMIN — ROCURONIUM BROMIDE 10 MG: 50 INJECTION, SOLUTION INTRAVENOUS at 18:05

## 2025-01-02 RX ADMIN — METRONIDAZOLE 500 MG: 500 INJECTION, SOLUTION INTRAVENOUS at 15:55

## 2025-01-02 RX ADMIN — POLYETHYLENE GLYCOL 3350 1 PACKET: 17 POWDER, FOR SOLUTION ORAL at 23:19

## 2025-01-02 RX ADMIN — ONDANSETRON 4 MG: 2 INJECTION INTRAMUSCULAR; INTRAVENOUS at 14:41

## 2025-01-02 RX ADMIN — LABETALOL HYDROCHLORIDE 5 MG: 5 INJECTION, SOLUTION INTRAVENOUS at 18:27

## 2025-01-02 RX ADMIN — SUGAMMADEX 200 MG: 100 INJECTION, SOLUTION INTRAVENOUS at 18:34

## 2025-01-02 RX ADMIN — ROCURONIUM BROMIDE 40 MG: 50 INJECTION, SOLUTION INTRAVENOUS at 18:10

## 2025-01-02 RX ADMIN — IOHEXOL 100 ML: 350 INJECTION, SOLUTION INTRAVENOUS at 12:15

## 2025-01-02 RX ADMIN — FENTANYL CITRATE 50 MCG: 50 INJECTION, SOLUTION INTRAMUSCULAR; INTRAVENOUS at 19:04

## 2025-01-02 RX ADMIN — SENNOSIDES AND DOCUSATE SODIUM 1 TABLET: 50; 8.6 TABLET ORAL at 23:18

## 2025-01-02 RX ADMIN — DEXAMETHASONE SODIUM PHOSPHATE 8 MG: 4 INJECTION INTRA-ARTICULAR; INTRALESIONAL; INTRAMUSCULAR; INTRAVENOUS; SOFT TISSUE at 18:08

## 2025-01-02 RX ADMIN — OXYCODONE HYDROCHLORIDE 10 MG: 5 SOLUTION ORAL at 18:49

## 2025-01-02 RX ADMIN — FENTANYL CITRATE 50 MCG: 50 INJECTION, SOLUTION INTRAMUSCULAR; INTRAVENOUS at 18:49

## 2025-01-02 RX ADMIN — SODIUM CHLORIDE, POTASSIUM CHLORIDE, SODIUM LACTATE AND CALCIUM CHLORIDE: 600; 310; 30; 20 INJECTION, SOLUTION INTRAVENOUS at 18:00

## 2025-01-02 RX ADMIN — IOHEXOL 25 ML: 240 INJECTION, SOLUTION INTRATHECAL; INTRAVASCULAR; INTRAVENOUS; ORAL at 12:15

## 2025-01-02 RX ADMIN — SUCCINYLCHOLINE CHLORIDE 140 MG: 20 INJECTION, SOLUTION INTRAMUSCULAR; INTRAVENOUS at 18:05

## 2025-01-02 RX ADMIN — CEFTRIAXONE SODIUM 2000 MG: 2 INJECTION, POWDER, FOR SOLUTION INTRAMUSCULAR; INTRAVENOUS at 15:50

## 2025-01-02 RX ADMIN — MORPHINE SULFATE 4 MG: 4 INJECTION, SOLUTION INTRAMUSCULAR; INTRAVENOUS at 14:41

## 2025-01-02 ASSESSMENT — PAIN DESCRIPTION - PAIN TYPE
TYPE: SURGICAL PAIN
TYPE: ACUTE PAIN;SURGICAL PAIN
TYPE: ACUTE PAIN;SURGICAL PAIN
TYPE: SURGICAL PAIN
TYPE: ACUTE PAIN
TYPE: SURGICAL PAIN

## 2025-01-02 ASSESSMENT — PAIN SCALES - GENERAL: PAIN_LEVEL: 0

## 2025-01-02 ASSESSMENT — FIBROSIS 4 INDEX: FIB4 SCORE: 0.69

## 2025-01-02 NOTE — ED PROVIDER NOTES
ED PHYSICIAN NOTE    CHIEF COMPLAINT  Chief Complaint   Patient presents with    Abdominal Pain     RLQ pain.     Sent from Urgent Care     Patient had out patient CT, told to come to ED Acute appendicitis with rupture.        EXTERNAL RECORDS REVIEWED  Outpatient Notes patient seen at urgent care yesterday.  CT scan today showed acute appendicitis with perforation as well as findings compatible diverticulitis    HPI/ROS      Braden Hill is a 44 y.o. male who presents with abdominal pain.  Started 4 days ago.  Quite severe 2 days ago.  A little bit better yesterday but pain persistent and therefore seen in urgent care.  They ordered CT scan.  He was found to have appendicitis he was told to come to the ER.  He has had nausea.  Has not vomited in a couple days.  Slightly loose stool.  Has had fevers.  No dysuria.    PAST MEDICAL HISTORY  Past Medical History:   Diagnosis Date    Has stopped breathing (HCC) 09/21/2013    At night, fatigued all the time    Heart burn     High cholesterol     Hypertriglyceridemia 09/21/2013    Indigestion     Malignant hyperthermia     Sleep apnea     no cpap    Snoring        SOCIAL HISTORY  Social History     Tobacco Use    Smoking status: Former     Current packs/day: 0.50     Average packs/day: 0.5 packs/day for 20.8 years (10.4 ttl pk-yrs)     Types: Cigars, Cigarettes     Start date: 2/28/2018    Smokeless tobacco: Former     Types: Chew     Quit date: 1/1/2017    Tobacco comments:     1 can qod as of 2/21 uses nicotine pouches   Vaping Use    Vaping status: Former   Substance Use Topics    Alcohol use: Yes     Alcohol/week: 3.6 oz     Types: 6 Cans of beer per week     Comment: 6 pack per week    Drug use: Yes     Frequency: 7.0 times per week     Types: Marijuana, Inhaled     Comment: vape marijuana- denies h/o heroin, cocaine; h/o meth x 4, smoke, quit around 2000.       CURRENT MEDICATIONS  Home Medications       Reviewed by Darvin Gray (Pharmacy Tech) on  "01/02/25 at 1449  Med List Status: Complete     Medication Last Dose Status   Acetaminophen (TYLENOL PO) 1/1/2025 Active   fenofibrate (TRICOR) 145 MG Tab  Active   sertraline (ZOLOFT) 25 MG tablet  Active                  Audit from Redirected Encounters    **Home medications have not yet been reviewed for this encounter**         ALLERGIES  No Known Allergies    PHYSICAL EXAM  VITAL SIGNS: /83   Pulse (!) 108   Temp 36.9 °C (98.5 °F) (Temporal)   Resp 13   Ht 1.778 m (5' 10\")   Wt 114 kg (251 lb 12.3 oz)   SpO2 93%   BMI 36.12 kg/m²    Constitutional: Awake and alert  HENT: Normal inspection  Eyes: Normal inspection  Neck: Grossly normal range of motion.  Cardiovascular: Normal heart rate  Thorax & Lungs: No respiratory distress  Abdomen: Bowel sounds normal, soft, non-distended, tender to palpation right lower quadrant.  No tenderness left lower quadrant  Extremities: No clubbing, cyanosis, edema, no Homans or cords.  Neurologic: Grossly normal   Psychiatric: Normal for situation     DIAGNOSTIC STUDIES / PROCEDURES  LABS/EKG  Results for orders placed or performed during the hospital encounter of 01/02/25   CBC WITH DIFFERENTIAL    Collection Time: 01/02/25  2:09 PM   Result Value Ref Range    WBC 11.8 (H) 4.8 - 10.8 K/uL    RBC 5.12 4.70 - 6.10 M/uL    Hemoglobin 15.8 14.0 - 18.0 g/dL    Hematocrit 44.8 42.0 - 52.0 %    MCV 87.5 81.4 - 97.8 fL    MCH 30.9 27.0 - 33.0 pg    MCHC 35.3 32.3 - 36.5 g/dL    RDW 38.8 35.9 - 50.0 fL    Platelet Count 271 164 - 446 K/uL    MPV 9.3 9.0 - 12.9 fL    Neutrophils-Polys 76.10 (H) 44.00 - 72.00 %    Lymphocytes 14.10 (L) 22.00 - 41.00 %    Monocytes 8.70 0.00 - 13.40 %    Eosinophils 0.40 0.00 - 6.90 %    Basophils 0.40 0.00 - 1.80 %    Immature Granulocytes 0.30 0.00 - 0.90 %    Nucleated RBC 0.00 0.00 - 0.20 /100 WBC    Neutrophils (Absolute) 8.94 (H) 1.82 - 7.42 K/uL    Lymphs (Absolute) 1.66 1.00 - 4.80 K/uL    Monos (Absolute) 1.02 (H) 0.00 - 0.85 K/uL    " Eos (Absolute) 0.05 0.00 - 0.51 K/uL    Baso (Absolute) 0.05 0.00 - 0.12 K/uL    Immature Granulocytes (abs) 0.04 0.00 - 0.11 K/uL    NRBC (Absolute) 0.00 K/uL   COMP METABOLIC PANEL    Collection Time: 01/02/25  2:09 PM   Result Value Ref Range    Sodium 133 (L) 135 - 145 mmol/L    Potassium 3.5 (L) 3.6 - 5.5 mmol/L    Chloride 96 96 - 112 mmol/L    Co2 22 20 - 33 mmol/L    Anion Gap 15.0 7.0 - 16.0    Glucose 124 (H) 65 - 99 mg/dL    Bun 17 8 - 22 mg/dL    Creatinine 1.01 0.50 - 1.40 mg/dL    Calcium 9.2 8.5 - 10.5 mg/dL    Correct Calcium 8.9 8.5 - 10.5 mg/dL    AST(SGOT) 23 12 - 45 U/L    ALT(SGPT) 37 2 - 50 U/L    Alkaline Phosphatase 107 (H) 30 - 99 U/L    Total Bilirubin 0.5 0.1 - 1.5 mg/dL    Albumin 4.4 3.2 - 4.9 g/dL    Total Protein 7.8 6.0 - 8.2 g/dL    Globulin 3.4 1.9 - 3.5 g/dL    A-G Ratio 1.3 g/dL   LIPASE    Collection Time: 01/02/25  2:09 PM   Result Value Ref Range    Lipase 29 11 - 82 U/L   ESTIMATED GFR    Collection Time: 01/02/25  2:09 PM   Result Value Ref Range    GFR (CKD-EPI) 93 >60 mL/min/1.73 m 2        COURSE & MEDICAL DECISION MAKING    INITIAL ASSESSMENT, COURSE AND PLAN  Care Narrative: Patient presents with acute appendicitis.  He is tachycardic.  Reports fevers at home.  Sepsis protocol ordered.  Ordered ceftriaxone and Flagyl.  Will give IV fluids.  Paged general surgery.    Data returned as noted above.    Discussed case with Dr. Giraldo.  Plan for appendectomy later today.    Interventions  Medications   cefTRIAXone (Rocephin) injection 2,000 mg (has no administration in time range)   metroNIDAZOLE (Flagyl) IVPB 500 mg (has no administration in time range)   NS (Bolus) 0.9 % infusion 1,000 mL (1,000 mL Intravenous New Bag 1/2/25 1440)   ondansetron (Zofran) syringe/vial injection 4 mg (4 mg Intravenous Given 1/2/25 1441)   morphine 4 MG/ML injection 4 mg (4 mg Intravenous Given 1/2/25 1441)       Measures  Hydration: Based on the patient's presentation of Tachycardia the patient  was given IV fluids. IV Hydration was used because oral hydration was not adequate alone. Upon recheck following hydration, the patient was improved.          DISPOSITION AND DISCUSSIONS  I have discussed management of the patient with the following physicians and LUIS ANGEL's:  as noted above      FINAL IMPRESSION  1.  Acute appendicitis    This dictation was created using voice recognition software. The accuracy of the dictation is limited to the abilities of the software. I expect there may be some errors of grammar and possibly content. The nursing notes were reviewed and certain aspects of this information were incorporated into this note.    Electronically signed by: Juan R Larson M.D., 1/2/2025

## 2025-01-02 NOTE — CONSULTS
DATE OF CONSULTATION:  1/2/2025     REFERRING PHYSICIAN:   Juan R Larson M.D.     CONSULTING PHYSICIAN:  Charley Giraldo M.D.     REASON FOR CONSULTATION:  I have been asked by Dr. Larson to see the patient in surgical consultation for evaluation of abdominal pain.    HISTORY OF PRESENT ILLNESS: The patient is a 44 year-old White man who presents to the Emergency Department with right lower quadrant abdominal pain for the past 4 days.  The patient states that the pain started approximately 3 days ago and worsened in the following 2 days.  He was seen in urgent care and a CT scan revealed the patient had appendicitis, prompting his referral to the emergency department.  The patient endorses previous nausea and emesis, but currently is not nauseated, no chills, no fevers.  He last ate chicken nuggets at approximately 1300 hrs.    Of note, the patient had a robotic right inguinal hernia repair with mesh and concurrent open umbilical hernia repair in February 2021.    PAST MEDICAL HISTORY:  has a past medical history of Has stopped breathing (HCC) (9/21/2013), Heart burn, High cholesterol, Hypertriglyceridemia (9/21/2013), Indigestion, Sleep apnea, and Snoring.    PAST SURGICAL HISTORY:  has a past surgical history that includes inguinal hernia repair robotic xi (Right, 2/22/2021) and repair, hernia, umbilical, robot-assisted, using da ramez (2/22/2021).    ALLERGIES: No Known Allergies    CURRENT MEDICATIONS:    Home Medications       Reviewed by Darvin Gray (Pharmacy Tech) on 01/02/25 at 1449  Med List Status: Complete     Medication Last Dose Status   Acetaminophen (TYLENOL PO) 1/1/2025 Active   fenofibrate (TRICOR) 145 MG Tab  Active   sertraline (ZOLOFT) 25 MG tablet  Active                  Audit from Redirected Encounters    **Home medications have not yet been reviewed for this encounter**         FAMILY HISTORY: family history includes Cancer in his paternal aunt; Hypertension in his  father.    SOCIAL HISTORY:  reports that he has quit smoking. His smoking use included cigars and cigarettes. He started smoking about 6 years ago. He has a 10.4 pack-year smoking history. He quit smokeless tobacco use about 8 years ago.  His smokeless tobacco use included chew. He reports current alcohol use of about 3.6 oz of alcohol per week. He reports current drug use. Frequency: 7.00 times per week. Drugs: Marijuana and Inhaled.    REVIEW OF SYSTEMS: Comprehensive review of systems is negative with the exception of the aforementioned HPI, PMH, and PSH bullets in accordance with CMS guidelines.    PHYSICAL EXAMINATION:    CONSTITUTIONAL: Alert, awake, oriented x3.  HEENT: Normocephalic, atraumatic. PERRL. Conjunctivae normal.  NECK: Supple  CARDIOVASCULAR: Normal rate, regular rhythm.  PULMONARY/CHEST: No respiratory distress. Chest wall stable, non-tender, normal excursion.  ABDOMEN: Soft, non-distended, tender to palpation in the right lower quadrant.  Well-healed surgical scars in the bilateral mid abdomen, as well as a periumbilical scar.  MUSCULOSKELETAL: Moving all extremities.  NEUROLOGICAL: CNII-XII grossly intact, no focal deficits.  SKIN: Warm and dry. No abrasions, lacerations.  PSYCHIATRIC: Behavior appropriate for situation.    LABORATORY VALUES:   Recent Labs     01/02/25  1409   WBC 11.8*   RBC 5.12   HEMOGLOBIN 15.8   HEMATOCRIT 44.8   MCV 87.5   MCH 30.9   MCHC 35.3   RDW 38.8   PLATELETCT 271   MPV 9.3     Recent Labs     01/02/25  1409   SODIUM 133*   POTASSIUM 3.5*   CHLORIDE 96   CO2 22   GLUCOSE 124*   BUN 17   CREATININE 1.01   CALCIUM 9.2     Recent Labs     01/02/25  1409   ASTSGOT 23   ALTSGPT 37   TBILIRUBIN 0.5   ALKPHOSPHAT 107*   GLOBULIN 3.4            IMAGING:   DX-CHEST-PORTABLE (1 VIEW)   Final Result      No acute cardiac or pulmonary abnormalities are identified.          ASSESSMENT AND PLAN:     This is a 44-year-old male with acute appendicitis, possible distal appendiceal  perforation.    Appendicitis, acute  Assessment & Plan  CT imaging revealing acute appendicitis with possible perforation of the distal appendix  The patient would benefit from a laparoscopic, possible open appendectomy  Unfortunately, as the patient ate solid food at 1300, the case must be scheduled for later this evening  The patient should remain n.p.o. with IV fluids  IV antibiotics  Risks, benefits, and alternatives discussed with the patient        DISPOSITION:  Will proceed to the OR as planned later today.     ____________________________________     Charley Giraldo M.D.    DD: 1/2/2025  3:05 PM    AAST Grading System for EGS Conditions  ACS NSQIP Surgical Risk Calculator

## 2025-01-02 NOTE — LETTER
[facility]  ORTHO/SPINE  [address] [id number]  [phone]     January 5, 2025    Patient: Braden Hill   YOB: 1980   Date of Visit: 1/2/2025       To Whom It May Concern:    Braden Hill was seen and treated in our department on 1/2/2025-1/5/2025. We recommend he remains off from work for the following duration 1/6/25-1/10/25. He may return to work with weight lifting restrictions no greater than 10-15 lbs on 1/13/25.    Sincerely,       Viji Mccarthy D.N.P.

## 2025-01-02 NOTE — ASSESSMENT & PLAN NOTE
CT imaging revealing acute appendicitis with possible perforation of the distal appendix  IV antibiotics - Zosyn  1/2 Lap Appy.  1/4 WBC 8. Plan to de-escalate to Augmentin  Plan to discharge with 4 days total of Abx  ACS Gray Service.

## 2025-01-02 NOTE — ED NOTES
Med Rec complete per patient   Allergies reviewed  Antibiotics in the past 30 days:no  Anticoagulant in past 14 days:no  Pharmacy patient utilizes:[facility] in [place]    Pt states he does not take any RX medications

## 2025-01-02 NOTE — ED TRIAGE NOTES
Chief Complaint   Patient presents with    Abdominal Pain     RLQ pain.     Sent from Urgent Care     Patient had out patient CT, told to come to ED Acute appendicitis with rupture.

## 2025-01-02 NOTE — ED NOTES
Assist RN: pt medicated and fluids started per MAR. Phlebotomy contacted for second set of cultures prior to ATB admin

## 2025-01-02 NOTE — RESULT ENCOUNTER NOTE
Called pt         CT shows acute appendicitis.    D/w pt and spouse      Tx to [facility] ED -  [facility] emergency appy

## 2025-01-03 LAB
ANION GAP SERPL CALC-SCNC: 15 MMOL/L (ref 7–16)
BASOPHILS # BLD AUTO: 0.2 % (ref 0–1.8)
BASOPHILS # BLD: 0.02 K/UL (ref 0–0.12)
BUN SERPL-MCNC: 16 MG/DL (ref 8–22)
CALCIUM SERPL-MCNC: 8.8 MG/DL (ref 8.5–10.5)
CHLORIDE SERPL-SCNC: 98 MMOL/L (ref 96–112)
CO2 SERPL-SCNC: 20 MMOL/L (ref 20–33)
CREAT SERPL-MCNC: 0.99 MG/DL (ref 0.5–1.4)
EOSINOPHIL # BLD AUTO: 0.01 K/UL (ref 0–0.51)
EOSINOPHIL NFR BLD: 0.1 % (ref 0–6.9)
ERYTHROCYTE [DISTWIDTH] IN BLOOD BY AUTOMATED COUNT: 39.6 FL (ref 35.9–50)
GFR SERPLBLD CREATININE-BSD FMLA CKD-EPI: 96 ML/MIN/1.73 M 2
GLUCOSE SERPL-MCNC: 175 MG/DL (ref 65–99)
HCT VFR BLD AUTO: 35.9 % (ref 42–52)
HGB BLD-MCNC: 12.7 G/DL (ref 14–18)
IMM GRANULOCYTES # BLD AUTO: 0.07 K/UL (ref 0–0.11)
IMM GRANULOCYTES NFR BLD AUTO: 0.6 % (ref 0–0.9)
LYMPHOCYTES # BLD AUTO: 0.74 K/UL (ref 1–4.8)
LYMPHOCYTES NFR BLD: 6 % (ref 22–41)
MAGNESIUM SERPL-MCNC: 2.2 MG/DL (ref 1.5–2.5)
MCH RBC QN AUTO: 31.2 PG (ref 27–33)
MCHC RBC AUTO-ENTMCNC: 35.4 G/DL (ref 32.3–36.5)
MCV RBC AUTO: 88.2 FL (ref 81.4–97.8)
MONOCYTES # BLD AUTO: 0.54 K/UL (ref 0–0.85)
MONOCYTES NFR BLD AUTO: 4.4 % (ref 0–13.4)
NEUTROPHILS # BLD AUTO: 11.01 K/UL (ref 1.82–7.42)
NEUTROPHILS NFR BLD: 88.7 % (ref 44–72)
NRBC # BLD AUTO: 0 K/UL
NRBC BLD-RTO: 0 /100 WBC (ref 0–0.2)
PATHOLOGY CONSULT NOTE: NORMAL
PLATELET # BLD AUTO: 235 K/UL (ref 164–446)
PMV BLD AUTO: 9.5 FL (ref 9–12.9)
POTASSIUM SERPL-SCNC: 4.1 MMOL/L (ref 3.6–5.5)
RBC # BLD AUTO: 4.07 M/UL (ref 4.7–6.1)
SODIUM SERPL-SCNC: 133 MMOL/L (ref 135–145)
WBC # BLD AUTO: 12.4 K/UL (ref 4.8–10.8)

## 2025-01-03 PROCEDURE — 700102 HCHG RX REV CODE 250 W/ 637 OVERRIDE(OP): Performed by: NURSE PRACTITIONER

## 2025-01-03 PROCEDURE — A9270 NON-COVERED ITEM OR SERVICE: HCPCS | Performed by: NURSE PRACTITIONER

## 2025-01-03 PROCEDURE — 700105 HCHG RX REV CODE 258: Performed by: SURGERY

## 2025-01-03 PROCEDURE — 99024 POSTOP FOLLOW-UP VISIT: CPT

## 2025-01-03 PROCEDURE — 700111 HCHG RX REV CODE 636 W/ 250 OVERRIDE (IP): Mod: JZ | Performed by: SURGERY

## 2025-01-03 PROCEDURE — 85025 COMPLETE CBC W/AUTO DIFF WBC: CPT

## 2025-01-03 PROCEDURE — 770001 HCHG ROOM/CARE - MED/SURG/GYN PRIV*

## 2025-01-03 PROCEDURE — 83735 ASSAY OF MAGNESIUM: CPT

## 2025-01-03 PROCEDURE — 36415 COLL VENOUS BLD VENIPUNCTURE: CPT

## 2025-01-03 PROCEDURE — 700102 HCHG RX REV CODE 250 W/ 637 OVERRIDE(OP): Performed by: SURGERY

## 2025-01-03 PROCEDURE — 80048 BASIC METABOLIC PNL TOTAL CA: CPT

## 2025-01-03 PROCEDURE — A9270 NON-COVERED ITEM OR SERVICE: HCPCS | Performed by: SURGERY

## 2025-01-03 RX ORDER — LABETALOL HYDROCHLORIDE 5 MG/ML
10 INJECTION, SOLUTION INTRAVENOUS EVERY 6 HOURS PRN
Status: DISCONTINUED | OUTPATIENT
Start: 2025-01-03 | End: 2025-01-05 | Stop reason: HOSPADM

## 2025-01-03 RX ORDER — ECHINACEA PURPUREA EXTRACT 125 MG
1 TABLET ORAL
Status: DISCONTINUED | OUTPATIENT
Start: 2025-01-03 | End: 2025-01-05 | Stop reason: HOSPADM

## 2025-01-03 RX ADMIN — OXYCODONE 5 MG: 5 TABLET ORAL at 20:59

## 2025-01-03 RX ADMIN — KETOROLAC TROMETHAMINE 15 MG: 15 INJECTION, SOLUTION INTRAMUSCULAR; INTRAVENOUS at 17:03

## 2025-01-03 RX ADMIN — PIPERACILLIN AND TAZOBACTAM 4.5 G: 4; .5 INJECTION, POWDER, FOR SOLUTION INTRAVENOUS at 11:15

## 2025-01-03 RX ADMIN — PIPERACILLIN AND TAZOBACTAM 4.5 G: 4; .5 INJECTION, POWDER, FOR SOLUTION INTRAVENOUS at 00:56

## 2025-01-03 RX ADMIN — KETOROLAC TROMETHAMINE 15 MG: 15 INJECTION, SOLUTION INTRAMUSCULAR; INTRAVENOUS at 06:07

## 2025-01-03 RX ADMIN — ACETAMINOPHEN 1000 MG: 500 TABLET ORAL at 06:09

## 2025-01-03 RX ADMIN — ACETAMINOPHEN 1000 MG: 500 TABLET ORAL at 17:03

## 2025-01-03 RX ADMIN — KETOROLAC TROMETHAMINE 15 MG: 15 INJECTION, SOLUTION INTRAMUSCULAR; INTRAVENOUS at 11:15

## 2025-01-03 RX ADMIN — PIPERACILLIN AND TAZOBACTAM 4.5 G: 4; .5 INJECTION, POWDER, FOR SOLUTION INTRAVENOUS at 21:00

## 2025-01-03 RX ADMIN — ACETAMINOPHEN 1000 MG: 500 TABLET ORAL at 23:13

## 2025-01-03 RX ADMIN — ACETAMINOPHEN 1000 MG: 500 TABLET ORAL at 11:16

## 2025-01-03 RX ADMIN — KETOROLAC TROMETHAMINE 15 MG: 15 INJECTION, SOLUTION INTRAMUSCULAR; INTRAVENOUS at 23:16

## 2025-01-03 RX ADMIN — Medication 1 SPRAY: at 23:34

## 2025-01-03 ASSESSMENT — PAIN DESCRIPTION - PAIN TYPE
TYPE: ACUTE PAIN
TYPE: ACUTE PAIN
TYPE: SURGICAL PAIN
TYPE: ACUTE PAIN
TYPE: SURGICAL PAIN
TYPE: SURGICAL PAIN

## 2025-01-03 ASSESSMENT — SOCIAL DETERMINANTS OF HEALTH (SDOH)
WITHIN THE PAST 12 MONTHS, THE FOOD YOU BOUGHT JUST DIDN'T LAST AND YOU DIDN'T HAVE MONEY TO GET MORE: NEVER TRUE
WITHIN THE LAST YEAR, HAVE YOU BEEN HUMILIATED OR EMOTIONALLY ABUSED IN OTHER WAYS BY YOUR PARTNER OR EX-PARTNER?: NO
WITHIN THE LAST YEAR, HAVE YOU BEEN AFRAID OF YOUR PARTNER OR EX-PARTNER?: NO
WITHIN THE PAST 12 MONTHS, YOU WORRIED THAT YOUR FOOD WOULD RUN OUT BEFORE YOU GOT THE MONEY TO BUY MORE: NEVER TRUE
WITHIN THE LAST YEAR, HAVE YOU BEEN KICKED, HIT, SLAPPED, OR OTHERWISE PHYSICALLY HURT BY YOUR PARTNER OR EX-PARTNER?: NO
IN THE PAST 12 MONTHS, HAS THE ELECTRIC, GAS, OIL, OR WATER COMPANY THREATENED TO SHUT OFF SERVICE IN YOUR HOME?: NO
WITHIN THE LAST YEAR, HAVE TO BEEN RAPED OR FORCED TO HAVE ANY KIND OF SEXUAL ACTIVITY BY YOUR PARTNER OR EX-PARTNER?: NO

## 2025-01-03 ASSESSMENT — COPD QUESTIONNAIRES
HAVE YOU SMOKED AT LEAST 100 CIGARETTES IN YOUR ENTIRE LIFE: YES
DO YOU EVER COUGH UP ANY MUCUS OR PHLEGM?: NO/ONLY WITH OCCASIONAL COLDS OR INFECTIONS
COPD SCREENING SCORE: 2
DURING THE PAST 4 WEEKS HOW MUCH DID YOU FEEL SHORT OF BREATH: NONE/LITTLE OF THE TIME

## 2025-01-03 ASSESSMENT — LIFESTYLE VARIABLES
HOW MANY TIMES IN THE PAST YEAR HAVE YOU HAD 5 OR MORE DRINKS IN A DAY: 0
TOTAL SCORE: 3
DOES PATIENT WANT TO TALK TO SOMEONE ABOUT QUITTING: NO
TOTAL SCORE: 3
ON A TYPICAL DAY WHEN YOU DRINK ALCOHOL HOW MANY DRINKS DO YOU HAVE: 6
HAVE YOU EVER FELT YOU SHOULD CUT DOWN ON YOUR DRINKING: YES
HAVE PEOPLE ANNOYED YOU BY CRITICIZING YOUR DRINKING: NO
EVER HAD A DRINK FIRST THING IN THE MORNING TO STEADY YOUR NERVES TO GET RID OF A HANGOVER: YES
TOTAL SCORE: 3
CONSUMPTION TOTAL: POSITIVE
EVER FELT BAD OR GUILTY ABOUT YOUR DRINKING: YES
ALCOHOL_USE: YES
DOES PATIENT WANT TO STOP DRINKING: YES
AVERAGE NUMBER OF DAYS PER WEEK YOU HAVE A DRINK CONTAINING ALCOHOL: 2

## 2025-01-03 ASSESSMENT — PATIENT HEALTH QUESTIONNAIRE - PHQ9
SUM OF ALL RESPONSES TO PHQ9 QUESTIONS 1 AND 2: 0
2. FEELING DOWN, DEPRESSED, IRRITABLE, OR HOPELESS: NOT AT ALL
1. LITTLE INTEREST OR PLEASURE IN DOING THINGS: NOT AT ALL

## 2025-01-03 NOTE — DISCHARGE PLANNING
Case Management Discharge Planning    Admission Date: 1/2/2025  GMLOS: 1.9  ALOS: 1    6-Clicks ADL Score:    6-Clicks Mobility Score:        Anticipated Discharge Dispo: Discharge Disposition: Discharged to home/self care (01)    DME Needed: No    Action(s) Taken: Pt was discussed during IDT rounds. Pt is not medically cleared to DC home, pt will continue IV abx Zosyn and recheck WBC tomorrow. LMSW received notice that pt has questions about FMLA paperwork. LMSW met with pt at bedside to complete assessment and discuss FMLA. Pt provided LMSW with FMLA paperwork from employer. FMLA paperwork and VIRGILIO was scanned into email, completed by pt, and sent to FMLA submission page.     CM consulted for ETOH use, SDOH resources were added to AVS.    Escalations Completed: None    Medically Clear: No    Next Steps: LMSW to follow for any additional CM needs.    Barriers to Discharge: Medical clearance    Is the patient up for discharge tomorrow: No

## 2025-01-03 NOTE — CARE PLAN
The patient is Stable - Low risk of patient condition declining or worsening    Shift Goals  Clinical Goals: ABX, pain control, monitoring lap sites  Patient Goals: pain control, mobility    Progress made toward(s) clinical / shift goals:  no further oozing from lap site, drsg continues to be CDI. Receiving medication for pain control, see doc flowsheets. Receiving zosyn.       Problem: Pain - Standard  Goal: Alleviation of pain or a reduction in pain to the patient’s comfort goal  Outcome: Progressing  Note: Receiving scheduled medications for pain control. See MAR     Problem: Knowledge Deficit - Standard  Goal: Patient and family/care givers will demonstrate understanding of plan of care, disease process/condition, diagnostic tests and medications  Outcome: Progressing  Note: Discussed plan of care for today w/ pt this am, he is A+Ox4, he verbalizes understanding of his plan of care, no questions. Emotional support given. Reinforcing education as necessary.

## 2025-01-03 NOTE — ANESTHESIA POSTPROCEDURE EVALUATION
Patient: Braden Hill    Procedure Summary       Date: 01/02/25 Room / Location: [facility] 09 / SURGERY [facility]    Anesthesia Start: 1800 Anesthesia Stop: 1846    Procedure: APPENDECTOMY, LAPAROSCOPIC (Abdomen) Diagnosis: (perforated appendicitis)    Surgeons: Keiko Rivera M.D. Responsible Provider: Samir Greer M.D.    Anesthesia Type: general ASA Status: 2 - Emergent            Final Anesthesia Type: general  Last vitals  BP   Blood Pressure: 113/63    Temp   37 °C (98.6 °F)    Pulse   (!) 101   Resp   16    SpO2   95 %      Anesthesia Post Evaluation    Patient location during evaluation: PACU  Patient participation: complete - patient participated  Level of consciousness: awake and alert  Pain score: 0    Airway patency: patent  Anesthetic complications: no  Cardiovascular status: hemodynamically stable  Respiratory status: acceptable and face mask  Hydration status: euvolemic    PONV: none          No notable events documented.

## 2025-01-03 NOTE — OP REPORT
DATE OF SERVICE:  01/02/2025     PREOPERATIVE DIAGNOSIS:  Acute appendicitis.     POSTOPERATIVE DIAGNOSIS:  Perforated appendicitis.     PROCEDURE:  Laparoscopic appendectomy.     SURGEON:  Keiko Rivera MD     ASSISTANT:  HARI Aguero     ANESTHESIA:  General endotracheal.     ANESTHESIOLOGIST:  Samir Greer MD     INDICATIONS:  The patient is a 44-year-old male who has had a several-day   history of abdominal pain.  He was brought to the emergency room where he was   found to have a leukocytosis as well as a CT scan showing probable perforated   appendicitis.  He is being brought at this time for laparoscopic appendectomy. The indications for a surgical assistant in this surgery were indicated due to complexity of the procedure. Their role included aiding in incision, retraction, holding devices including camera for laparoscopic procedure, and closure of the wound.        FINDINGS:  Acute appendicitis with perforation.  It was a retrocecal appendix   and walled off to the sidewall.     DESCRIPTION OF PROCEDURE:  After the patient was identified and consented, he   was brought to the operating room and placed in supine position.  The patient   underwent general endotracheal anesthetic.  The patient's abdomen was prepped   and draped in sterile fashion.  Periumbilical area was anesthetized with 0.25%   Marcaine and a 1 cm incision was made.  The abdominal wall was lifted up and   Veress needle was inserted into the abdominal cavity.  After positive drop   test, pneumoperitoneum was obtained, Veress needle was removed, and a 5 mm   trocar was placed.  Under laparoscopic guidance, a 5 mm trocar was placed in   right subcostal position and a 12 mm trocar was placed in the suprapubic   position.  Appendix was identified.  It was stuck down to the sidewall.  It   was mobilized from the base and amputated with an Endo-MIGUELITO stapler and then   taken down to the end of it. Fragments of appendix were also  removed   separately.  The right lower quadrant was irrigated clear and then   pneumoperitoneum was released.  Port sites were closed with interrupted 4-0   Vicryls.  Steri-Strip and dry dressing placed on the wounds.  The patient was   extubated and taken to recovery in stable condition.  All sponge and needle   counts were correct.        ______________________________  MD CYNTHIA GARCIA/CONCEPCION    DD:  01/02/2025 18:39  DT:  01/02/2025 19:45    Job#:  [id number]

## 2025-01-03 NOTE — PROGRESS NOTES
Patient transferred from recovery to room T335-2, patient aox4, able to follow commands, not in distress, pain is tolerable at the moment verbalized by the patient, patient able to tolerate fluids, -flatus yet but patient stated he is feeling is already.      4 Eyes Skin Assessment Completed by MICHAEL Johnston and MICHAEL Barrera.    Head WDL  Ears WDL  Nose WDL  Mouth WDL  Neck WDL  Breast/Chest WDL  Shoulder Blades WDL  Spine WDL  (R) Arm/Elbow/Hand WDL  (L) Arm/Elbow/Hand WDL  Abdomen Incision 3 lap site, RUQ dressing is soaked in blood  Groin WDL  Scrotum/Coccyx/Buttocks WDL  (R) Leg WDL  (L) Leg WDL  (R) Heel/Foot/Toe WDL  (L) Heel/Foot/Toe WDL          Devices In Places Blood Pressure Cuff, Pulse Ox, and SCD's      Interventions In Place Pillows, Dri-Ruddy Pads, and Heels Loaded W/Pillows    Possible Skin Injury No    Pictures Uploaded Into Epic N/A  Wound Consult Placed N/A  RN Wound Prevention Protocol Ordered No

## 2025-01-03 NOTE — DOCUMENTATION QUERY
[facility]                                                                       Query Response Note      PATIENT:               JAYDA LANE  ACCT #:                  [id number]  MRN:                     [id number]  :                      1980  ADMIT DATE:       2025 1:44 PM  DISCH DATE:          RESPONDING  PROVIDER #:        [id number]           QUERY TEXT:    The patient has a lab value of sodium 133. Please clarify whether the following has been treated/evaluated during this hospitalization:    Please add your response to this query in your progress notes if you agree, thank you.    The patient's Clinical Indicators include:  1/2 Surgery general note: Acute appendicitis. The patient endorses previous nausea and emesis  Labs: Sodium 133 (, 1/3)     Risk factors: Emesis, NPO    Treatment: NS infusion, monitor CMP    Thank you,  Darrian Null NP, CCDS   Clinical   Connect via [facility]  Options provided:   -- Hyponatremia   -- Findings are clinically insignificant   -- Other explanation, (please specify other explanation)      Query created by: Darrian Null on 1/3/2025 8:54 AM    RESPONSE TEXT:    Findings are clinically insignificant          Electronically signed by:  NATHALY GRAY MD 1/3/2025 9:32 AM

## 2025-01-03 NOTE — CARE PLAN
The patient is Stable - Low risk of patient condition declining or worsening    Shift Goals  Clinical Goals: abx infusion, pain mgt, wof for bleeding  Patient Goals: rest    Progress made toward(s) clinical / shift goals:      Patient is aox4, denies any SOB/Chest pain/N and V. Able to follow commands, dressing in RUQ is soaked in blood, dressing were change and ice pack were place,       Problem: Pain - Standard  Goal: Alleviation of pain or a reduction in pain to the patient’s comfort goal  Description: Target End Date:  Prior to discharge or change in level of care    Document on Vitals flowsheet    1.  Document pain using the appropriate pain scale per order or unit policy  2.  Educate and implement non-pharmacologic comfort measures (i.e. relaxation, distraction, massage, cold/heat therapy, etc.)  3.  Pain management medications as ordered  4.  Reassess pain after pain med administration per policy  5.  If opiods administered assess patient's response to pain medication is appropriate per POSS sedation scale  6.  Follow pain management plan developed in collaboration with patient and interdisciplinary team (including palliative care or pain specialists if applicable)  Outcome: Progressing  non-pharmacological and pharmacological intervention in place. Instructed to call RN if pain is gradually increasing.      Problem: Knowledge Deficit - Standard  Goal: Patient and family/care givers will demonstrate understanding of plan of care, disease process/condition, diagnostic tests and medications  Description: Target End Date:  1-3 days or as soon as patient condition allows    Document in Patient Education    1.  Patient and family/caregiver oriented to unit, equipment, visitation policy and means for communicating concern  2.  Complete/review Learning Assessment  3.  Assess knowledge level of disease process/condition, treatment plan, diagnostic tests and medications  4.  Explain disease process/condition,  treatment plan, diagnostic tests and medications  Outcome: Progressing       Patient is not progressing towards the following goals:

## 2025-01-03 NOTE — ANESTHESIA TIME REPORT
Anesthesia Start and Stop Event Times       Date Time Event    1/2/2025 1745 Ready for Procedure     1800 Anesthesia Start     1846 Anesthesia Stop          Responsible Staff  01/02/25      Name Role Begin End    Samir Greer M.D. Anesth 1800 1846          Overtime Reason:  no overtime (within assigned shift)    Comments:

## 2025-01-03 NOTE — PROGRESS NOTES
"  DATE: 1/3/2025    Post Operative Day 1 laparoscopic appendectomy.    INTERVAL EVENTS:  Doing well. Mild abdominal pain but tolerated with pain medications.  Tolerating diet.  IV abx: Zosyn.  WBC 12.4 (11.8)    PHYSICAL EXAMINATION:  Vital Signs: BP (!) 150/90   Pulse 86   Temp 36.7 °C (98 °F) (Temporal)   Resp 16   Ht 1.778 m (5' 10\")   Wt 114 kg (251 lb 12.3 oz)   SpO2 96%     Awake and alert.  Abdomen soft, rounded, with appropriate tenderness at surgical port sites.   Surgical port sites covered with dressings. Small amount of bleeding to pelvic dressing.  Respiratory rate even and unlabored.    LABORATORY VALUES:  Recent Labs     01/02/25  1409 01/03/25  0124   WBC 11.8* 12.4*   RBC 5.12 4.07*   HEMOGLOBIN 15.8 12.7*   HEMATOCRIT 44.8 35.9*   MCV 87.5 88.2   MCH 30.9 31.2   MCHC 35.3 35.4   RDW 38.8 39.6   PLATELETCT 271 235   MPV 9.3 9.5     Recent Labs     01/02/25  1409 01/03/25  0124   SODIUM 133* 133*   POTASSIUM 3.5* 4.1   CHLORIDE 96 98   CO2 22 20   GLUCOSE 124* 175*   BUN 17 16   CREATININE 1.01 0.99   CALCIUM 9.2 8.8     Recent Labs     01/02/25  1409   ASTSGOT 23   ALTSGPT 37   TBILIRUBIN 0.5   ALKPHOSPHAT 107*   GLOBULIN 3.4       ASSESSMENT AND PLAN:  Appendicitis, acute  Assessment & Plan  CT imaging revealing acute appendicitis with possible perforation of the distal appendix  IV antibiotics - Zosyn  1/2 Lap Appy.  ACS Gray Service.      - Advance diet. Saline lock when taking PO.  - Encourage ambulation and sitting in chair.  - Incentive spirometer.  - Change dressings as needed. Call for any concerns.  - Continue IV abx. Recheck WBC tomorrow.         ____________________________________     Viji Mccarthy D.N.P.    DD: 1/3/2025  6:30 AM    "

## 2025-01-03 NOTE — PROGRESS NOTES
Virtual Nurse admission and  rounding complete.    Round Needs: Other: Patient designated caregiver and Notified of Patient Needs: Unit clerk.

## 2025-01-03 NOTE — ANESTHESIA PROCEDURE NOTES
Airway    Date/Time: 1/2/2025 6:06 PM    Performed by: Samir Greer M.D.  Authorized by: Samir Greer M.D.    Location:  OR  Urgency:  Elective  Difficult Airway: No    Indications for Airway Management:  Anesthesia      Spontaneous Ventilation: absent    Sedation Level:  Deep  Preoxygenated: Yes    Patient Position:  Sniffing  Mask Difficulty Assessment:  0 - not attempted  Final Airway Type:  Endotracheal airway  Final Endotracheal Airway:  ETT  Cuffed: Yes    Technique Used for Successful ETT Placement:  Direct laryngoscopy  Devices/Methods Used in Placement:  Intubating stylet    Insertion Site:  Oral  Blade Type:  Bentley  Laryngoscope Blade/Videolaryngoscope Blade Size:  3  ETT Size (mm):  8.0  Measured from:  Lips  ETT to Lips (cm):  24  Placement Verified by: capnometry    Cormack-Lehane Classification:  Grade I - full view of glottis  Number of Attempts at Approach:  1

## 2025-01-03 NOTE — OR NURSING
2100 Report received from MICHAEL Singleton in [facility], pt AAOx4, updated on POC, pain tolerable per pt, Some minimal drainage from 1 lap site, other 2 lap sites CDI.     2204 Report called to MICHAEL Johnston on Ortho/Spine    2220 Pt transferred from [facility] to bed, steady on feet with no assistance required, lap site had increased drainage, pressure held and dressing changed.     2226 Pt transported to room T335-2 with transport. Chart and belongings with pt.

## 2025-01-03 NOTE — PROGRESS NOTES
Received care of pt from NOC RN this am. Bedside report done; RUQ drsg CDI. Pt is A+O x 4, denies need for pain medication.

## 2025-01-03 NOTE — ANESTHESIA PREPROCEDURE EVALUATION
Case: [id number] Date/Time: 01/02/25 1745    Procedure: APPENDECTOMY, LAPAROSCOPIC    Location: [facility] OR 09 / SURGERY [facility]    Surgeons: Keiko Rivera M.D.            Relevant Problems   ANESTHESIA   (positive) TOMER on CPAP      GI   (positive) Gastroesophageal reflux disease      Other   (positive) Appendicitis, acute   (positive) BMI 34.0-34.9,adult   (positive) History of tobacco abuse     No prior anesthetic complications. Last ate at 1300, however, patient with acute abdomen. Will proceed with surgical procedure.    Vitals:    01/02/25 1403   BP: 139/83   Pulse: (!) 108   Resp:    Temp:    SpO2: 93%        Recent Labs     01/02/25  1409   WBC 11.8*   RBC 5.12   HEMOGLOBIN 15.8   HEMATOCRIT 44.8   MCV 87.5   MCH 30.9   RDW 38.8   PLATELETCT 271   MPV 9.3   NEUTSPOLYS 76.10*   LYMPHOCYTES 14.10*   MONOCYTES 8.70   EOSINOPHILS 0.40   BASOPHILS 0.40       Recent Labs     01/02/25  1409   SODIUM 133*   POTASSIUM 3.5*   CHLORIDE 96   CO2 22   GLUCOSE 124*   BUN 17       Physical Exam    Airway   Mallampati: II  TM distance: >3 FB  Neck ROM: full       Cardiovascular - normal exam  Rhythm: regular  Rate: normal  (-) murmur     Dental - normal exam           Pulmonary - normal exam  Breath sounds clear to auscultation     Abdominal    Neurological - normal exam                   Anesthesia Plan    ASA 2- EMERGENT (acute abdomen)       Plan - general       Airway plan will be ETT          Induction: intravenous    Postoperative Plan: Postoperative administration of opioids is intended.    Pertinent diagnostic labs and testing reviewed    Informed Consent:    Anesthetic plan and risks discussed with patient.    Use of blood products discussed with: patient whom consented to blood products.

## 2025-01-03 NOTE — DISCHARGE INSTRUCTIONS
Post Operative Discharge Instructions:    1. DIET: Upon discharge from the hospital, you may resume your normal preoperative diet, unless specifically directed otherwise. Depending on how you are feeling and whether you have nausea or not, you may wish to stay with a bland diet for the first few days. However, you can advance this as quickly as you feel ready.    2. ACTIVITIES: After discharge from the hospital, you may resume full routine activities; however, there should be no heavy lifting (greater than 20 pounds or a bag of groceries) and no strenuous activities for at least 2 weeks. The duration may be longer, depending on your surgical procedure. Routine activities of daily living are acceptable. Activity level should be addressed at your post-op follow up appointment.    3. DRIVING: You may drive whenever you are off pain medications and are able to perform the activities needed to drive, i.e., turning, bending, twisting, etc.    4. BATHING: You may get the wound wet at any time after leaving the hospital. You may shower, but do not submerge in a bath for at least two weeks.  If you have wound dressings, they may come off after 48 hours.  If you have skin glue to the wound, this will fall off on its own, do not pick at it.  If you have Steri-Strips to the wound, these will fall off on their own, do not pick at them. You may trim the edges if needed.    5. BOWEL FUNCTION:   After surgery, it is not uncommon for patients to develop either frequent or loose stools after meals. This usually corrects itself after a few days, to a few weeks. If this occurs, do not worry; this will resolve on its own.  Constipation is much more common than loose stools. The cause is the combination of pain medication and decreased activity level and possibly the nature of the surgical procedure performed. If you feel this is occurring, you may use an over-the-counter treatment such as MiraLAX (or Milk of Magnesia, Ex-Lax,  Senokot, etc.) until the problem has resolved. Drink plenty of water and try to wean off narcotic pain medications as soon as is comfortable for you.    6. PAIN MEDICATION:   You will be given a prescription for pain medication at discharge. Please take these as directed. It is important to remember not to take medications on an empty stomach as this may cause nausea.  You may also take over the counter acetaminophen and/or NSAIDS (ibuprofen, Aleve, Advil, Motrin) per the package instructions.  You may also use ice to the wound to decrease pain and swelling. You may alternate 20 minutes on and 20 minutes off with the ice for the first 24-48 hours. Make sure you place a washcloth or towel between the ice pack and your skin.  Please note that narcotic pain medication cannot be refilled unless you are seen by a doctor. Make sure you call the office if you are running low on medication or if the dose you have been prescribed is not working well for you.    7.CALL THE [facility] AT [phone] IF YOU HAVE:  (1) Fevers to more than 101F, (2) Unusual chest or leg pain, (3) Drainage or fluid from incision that may be foul smelling, increased tenderness or soreness at the wound or the wound edges are no longer together, redness or swelling at the incision site. Do not hesitate to call with any other questions.

## 2025-01-03 NOTE — DISCHARGE PLANNING
Care Transition Team Assessment  PCP: Asuncion Jacob MD  LMSW met with pt at bedside to complete assessment. Pt A&Ox4 and able to verify the information on the face sheet. Pt presented to the hospital for acute appendicitis. Pt lives with her spouse in a single-story house at [address] [place] [address], [place] [address] that has two steps to enter. Prior to this hospitalization, pt reports being independent at home with ADLs and IADLs. Pt denies any DME at baseline. Pt reports his spouse. Lilibeth, as good support for him. Pt was previously a full-time employee at [employer] and currently on FMLA leave. Pt denies any substance use or mental health concerns. CM consulted for ETOH use, SDOH resources were added to AVS. Pt denies having an advance directive and declined AD packet at this time. Pt confirmed that he has [employer] insurance through his employer. Pt confirmed he will have transportation home upon DC.  Information Source  Information Given By: Patient  Informant's Name: Braden  Who is responsible for making decisions for patient? : Patient    Readmission Evaluation  Is this a readmission?: No    Elopement Risk  Legal Hold: No  Ambulatory or Self Mobile in Wheelchair: Yes  Disoriented: No  Psychiatric Symptoms: None  History of Wandering: No  Elopement this Admit: No  Vocalizing Wanting to Leave: No  Displays Behaviors, Body Language Wanting to Leave: No-Not at Risk for Elopement  Elopement Risk: Not at Risk for Elopement    Interdisciplinary Discharge Planning  Lives with - Patient's Self Care Capacity: Spouse  Patient or legal guardian wants to designate a caregiver: Yes  Caregiver name: Lilibeth Hill  Caregiver contact info: [phone]  Support Systems: Spouse / Significant Other  Housing / Facility: 1 Story House    Discharge Preparedness  What is your plan after discharge?: Home with help  What are your discharge supports?: Spouse  Prior Functional Level: Ambulatory, Drives Self, Independent with Activities of Daily  Living, Independent with Medication Management  Difficulity with ADLs: None  Difficulity with IADLs: None    Functional Assesment  Prior Functional Level: Ambulatory, Drives Self, Independent with Activities of Daily Living, Independent with Medication Management    Finances  Financial Barriers to Discharge: No  Prescription Coverage: Yes    Advance Directive  Advance Directive?: None  Advance Directive offered?: AD Booklet refused    Domestic Abuse  Have you ever been the victim of abuse or violence?: No  Possible Abuse/Neglect Reported to:: Not Applicable    Psychological Assessment  History of Substance Abuse: None  History of Psychiatric Problems: No  Non-compliant with Treatment: No  Newly Diagnosed Illness: No    Discharge Risks or Barriers  Discharge risks or barriers?: No    Anticipated Discharge Information  Discharge Disposition: Discharged to home/self care (01)

## 2025-01-04 LAB
ANION GAP SERPL CALC-SCNC: 13 MMOL/L (ref 7–16)
BASOPHILS # BLD AUTO: 0.3 % (ref 0–1.8)
BASOPHILS # BLD: 0.02 K/UL (ref 0–0.12)
BUN SERPL-MCNC: 21 MG/DL (ref 8–22)
CALCIUM SERPL-MCNC: 8.8 MG/DL (ref 8.5–10.5)
CHLORIDE SERPL-SCNC: 101 MMOL/L (ref 96–112)
CO2 SERPL-SCNC: 23 MMOL/L (ref 20–33)
CREAT SERPL-MCNC: 0.85 MG/DL (ref 0.5–1.4)
EOSINOPHIL # BLD AUTO: 0.07 K/UL (ref 0–0.51)
EOSINOPHIL NFR BLD: 0.9 % (ref 0–6.9)
ERYTHROCYTE [DISTWIDTH] IN BLOOD BY AUTOMATED COUNT: 39.3 FL (ref 35.9–50)
GFR SERPLBLD CREATININE-BSD FMLA CKD-EPI: 109 ML/MIN/1.73 M 2
GLUCOSE SERPL-MCNC: 112 MG/DL (ref 65–99)
HCT VFR BLD AUTO: 32 % (ref 42–52)
HGB BLD-MCNC: 11.3 G/DL (ref 14–18)
IMM GRANULOCYTES # BLD AUTO: 0.06 K/UL (ref 0–0.11)
IMM GRANULOCYTES NFR BLD AUTO: 0.8 % (ref 0–0.9)
LYMPHOCYTES # BLD AUTO: 1.88 K/UL (ref 1–4.8)
LYMPHOCYTES NFR BLD: 23.6 % (ref 22–41)
MAGNESIUM SERPL-MCNC: 2.6 MG/DL (ref 1.5–2.5)
MCH RBC QN AUTO: 30.9 PG (ref 27–33)
MCHC RBC AUTO-ENTMCNC: 35.3 G/DL (ref 32.3–36.5)
MCV RBC AUTO: 87.4 FL (ref 81.4–97.8)
MONOCYTES # BLD AUTO: 0.7 K/UL (ref 0–0.85)
MONOCYTES NFR BLD AUTO: 8.8 % (ref 0–13.4)
NEUTROPHILS # BLD AUTO: 5.22 K/UL (ref 1.82–7.42)
NEUTROPHILS NFR BLD: 65.6 % (ref 44–72)
NRBC # BLD AUTO: 0 K/UL
NRBC BLD-RTO: 0 /100 WBC (ref 0–0.2)
PLATELET # BLD AUTO: 264 K/UL (ref 164–446)
PMV BLD AUTO: 9.6 FL (ref 9–12.9)
POTASSIUM SERPL-SCNC: 3.3 MMOL/L (ref 3.6–5.5)
RBC # BLD AUTO: 3.66 M/UL (ref 4.7–6.1)
SODIUM SERPL-SCNC: 137 MMOL/L (ref 135–145)
WBC # BLD AUTO: 8 K/UL (ref 4.8–10.8)

## 2025-01-04 PROCEDURE — 700111 HCHG RX REV CODE 636 W/ 250 OVERRIDE (IP): Mod: JZ | Performed by: SURGERY

## 2025-01-04 PROCEDURE — 700102 HCHG RX REV CODE 250 W/ 637 OVERRIDE(OP): Performed by: SURGERY

## 2025-01-04 PROCEDURE — 36415 COLL VENOUS BLD VENIPUNCTURE: CPT

## 2025-01-04 PROCEDURE — RXMED WILLOW AMBULATORY MEDICATION CHARGE

## 2025-01-04 PROCEDURE — 700105 HCHG RX REV CODE 258: Performed by: SURGERY

## 2025-01-04 PROCEDURE — 83735 ASSAY OF MAGNESIUM: CPT

## 2025-01-04 PROCEDURE — 80048 BASIC METABOLIC PNL TOTAL CA: CPT

## 2025-01-04 PROCEDURE — 700102 HCHG RX REV CODE 250 W/ 637 OVERRIDE(OP)

## 2025-01-04 PROCEDURE — 770001 HCHG ROOM/CARE - MED/SURG/GYN PRIV*

## 2025-01-04 PROCEDURE — A9270 NON-COVERED ITEM OR SERVICE: HCPCS

## 2025-01-04 PROCEDURE — A9270 NON-COVERED ITEM OR SERVICE: HCPCS | Performed by: SURGERY

## 2025-01-04 PROCEDURE — 94660 CPAP INITIATION&MGMT: CPT

## 2025-01-04 PROCEDURE — 85025 COMPLETE CBC W/AUTO DIFF WBC: CPT

## 2025-01-04 PROCEDURE — 99024 POSTOP FOLLOW-UP VISIT: CPT

## 2025-01-04 RX ORDER — IBUPROFEN 600 MG/1
600 TABLET, FILM COATED ORAL EVERY 6 HOURS PRN
COMMUNITY
Start: 2025-01-04

## 2025-01-04 RX ORDER — AMOXICILLIN 250 MG
1 CAPSULE ORAL DAILY
COMMUNITY
Start: 2025-01-04

## 2025-01-04 RX ORDER — OXYCODONE HYDROCHLORIDE 5 MG/1
5 TABLET ORAL EVERY 6 HOURS PRN
Qty: 12 TABLET | Refills: 0 | Status: SHIPPED | OUTPATIENT
Start: 2025-01-04 | End: 2025-01-08

## 2025-01-04 RX ORDER — POTASSIUM CHLORIDE 1500 MG/1
20 TABLET, EXTENDED RELEASE ORAL 2 TIMES DAILY
Status: DISCONTINUED | OUTPATIENT
Start: 2025-01-04 | End: 2025-01-05 | Stop reason: HOSPADM

## 2025-01-04 RX ADMIN — ACETAMINOPHEN 1000 MG: 500 TABLET ORAL at 16:21

## 2025-01-04 RX ADMIN — POTASSIUM CHLORIDE 20 MEQ: 1500 TABLET, EXTENDED RELEASE ORAL at 08:28

## 2025-01-04 RX ADMIN — POTASSIUM CHLORIDE 20 MEQ: 1500 TABLET, EXTENDED RELEASE ORAL at 16:22

## 2025-01-04 RX ADMIN — OXYCODONE 5 MG: 5 TABLET ORAL at 20:06

## 2025-01-04 RX ADMIN — KETOROLAC TROMETHAMINE 15 MG: 15 INJECTION, SOLUTION INTRAMUSCULAR; INTRAVENOUS at 22:35

## 2025-01-04 RX ADMIN — OXYCODONE 5 MG: 5 TABLET ORAL at 08:30

## 2025-01-04 RX ADMIN — OXYCODONE 5 MG: 5 TABLET ORAL at 12:07

## 2025-01-04 RX ADMIN — PIPERACILLIN AND TAZOBACTAM 4.5 G: 4; .5 INJECTION, POWDER, FOR SOLUTION INTRAVENOUS at 05:51

## 2025-01-04 RX ADMIN — ACETAMINOPHEN 1000 MG: 500 TABLET ORAL at 05:46

## 2025-01-04 RX ADMIN — AMOXICILLIN AND CLAVULANATE POTASSIUM 1 TABLET: 875; 125 TABLET, FILM COATED ORAL at 20:02

## 2025-01-04 RX ADMIN — KETOROLAC TROMETHAMINE 15 MG: 15 INJECTION, SOLUTION INTRAMUSCULAR; INTRAVENOUS at 05:48

## 2025-01-04 RX ADMIN — KETOROLAC TROMETHAMINE 15 MG: 15 INJECTION, SOLUTION INTRAMUSCULAR; INTRAVENOUS at 12:02

## 2025-01-04 RX ADMIN — AMOXICILLIN AND CLAVULANATE POTASSIUM 1 TABLET: 875; 125 TABLET, FILM COATED ORAL at 12:02

## 2025-01-04 RX ADMIN — ACETAMINOPHEN 1000 MG: 500 TABLET ORAL at 12:01

## 2025-01-04 RX ADMIN — KETOROLAC TROMETHAMINE 15 MG: 15 INJECTION, SOLUTION INTRAMUSCULAR; INTRAVENOUS at 16:21

## 2025-01-04 RX ADMIN — ACETAMINOPHEN 1000 MG: 500 TABLET ORAL at 22:34

## 2025-01-04 RX ADMIN — OXYCODONE 5 MG: 5 TABLET ORAL at 16:22

## 2025-01-04 ASSESSMENT — COGNITIVE AND FUNCTIONAL STATUS - GENERAL
SUGGESTED CMS G CODE MODIFIER MOBILITY: CJ
STANDING UP FROM CHAIR USING ARMS: A LITTLE
DAILY ACTIVITIY SCORE: 22
CLIMB 3 TO 5 STEPS WITH RAILING: A LITTLE
HELP NEEDED FOR BATHING: A LITTLE
MOBILITY SCORE: 22
SUGGESTED CMS G CODE MODIFIER DAILY ACTIVITY: CJ
DRESSING REGULAR LOWER BODY CLOTHING: A LITTLE

## 2025-01-04 ASSESSMENT — PAIN DESCRIPTION - PAIN TYPE
TYPE: SURGICAL PAIN
TYPE: ACUTE PAIN;SURGICAL PAIN
TYPE: SURGICAL PAIN
TYPE: ACUTE PAIN;SURGICAL PAIN
TYPE: SURGICAL PAIN

## 2025-01-04 NOTE — PROGRESS NOTES
"  DATE: 1/4/2025    Post Operative Day 2 laparoscopic appendectomy.     INTERVAL EVENTS:  Feeling better today.  WBC 8.0 (12.4).   Tolerating diet.  +BM    PHYSICAL EXAMINATION:  Vital Signs: /72   Pulse 81   Temp 36.7 °C (98 °F) (Temporal)   Resp 16   Ht 1.778 m (5' 10\")   Wt 114 kg (251 lb 12.3 oz)   SpO2 94%     Awake and alert.  Abdomen soft, rounded (baseline), with appropriate tenderness at surgical port sites.   Surgical port sites covered with dressings. Small amount of bleeding to pelvic dressing.  Respiratory rate even and unlabored.      LABORATORY VALUES:  Recent Labs     01/02/25  1409 01/03/25  0124 01/04/25  0235   WBC 11.8* 12.4* 8.0   RBC 5.12 4.07* 3.66*   HEMOGLOBIN 15.8 12.7* 11.3*   HEMATOCRIT 44.8 35.9* 32.0*   MCV 87.5 88.2 87.4   MCH 30.9 31.2 30.9   MCHC 35.3 35.4 35.3   RDW 38.8 39.6 39.3   PLATELETCT 271 235 264   MPV 9.3 9.5 9.6     Recent Labs     01/02/25  1409 01/03/25  0124 01/04/25  0235   SODIUM 133* 133* 137   POTASSIUM 3.5* 4.1 3.3*   CHLORIDE 96 98 101   CO2 22 20 23   GLUCOSE 124* 175* 112*   BUN 17 16 21   CREATININE 1.01 0.99 0.85   CALCIUM 9.2 8.8 8.8     Recent Labs     01/02/25  1409   ASTSGOT 23   ALTSGPT 37   TBILIRUBIN 0.5   ALKPHOSPHAT 107*   GLOBULIN 3.4         ASSESSMENT AND PLAN:  Appendicitis, acute  Assessment & Plan  CT imaging revealing acute appendicitis with possible perforation of the distal appendix  IV antibiotics - Zosyn  1/2 Lap Appy.  1/4 WBC 8. Plan to de-escalate to Augmentin  Plan to discharge with 4 days total of Abx  ACS Gray Service.      - Plan to change IV abx to PO  - Encourage ambulation and sitting in chair.  - Incentive spirometer  - Plan for WBC tomorrow. IF stable, plan to discharge.         ____________________________________     Viji Mccarthy D.N.P.    DD: 1/4/2025  7:16 AM    "

## 2025-01-04 NOTE — CARE PLAN
The patient is Stable - Low risk of patient condition declining or worsening    Shift Goals  Clinical Goals: Pain control, IV abx  Patient Goals: Pain control, rest  Family Goals: Not present    Progress made toward(s) clinical / shift goals:  Patient educated on the pain scale and to notify RN of pain. Patient verbalizes pain control using PRN oxycodone and scheduled medications. Patient encouraged to notify RN if pain remains uncontrolled. Patient medicated per MAR.     Patient is not progressing towards the following goals:

## 2025-01-04 NOTE — PROGRESS NOTES
"Patient complaining of \"stuffy nose\" and requesting nasal spray. NOC Trauma APRN notified, new orders received.   "

## 2025-01-05 ENCOUNTER — PHARMACY VISIT (OUTPATIENT)
Dept: PHARMACY | Facility: MEDICAL CENTER | Age: 45
End: 2025-01-05
Payer: COMMERCIAL

## 2025-01-05 VITALS
OXYGEN SATURATION: 93 % | WEIGHT: 251.77 LBS | TEMPERATURE: 97.7 F | HEART RATE: 78 BPM | BODY MASS INDEX: 36.04 KG/M2 | HEIGHT: 70 IN | RESPIRATION RATE: 18 BRPM | DIASTOLIC BLOOD PRESSURE: 75 MMHG | SYSTOLIC BLOOD PRESSURE: 115 MMHG

## 2025-01-05 LAB
BASOPHILS # BLD AUTO: 0.7 % (ref 0–1.8)
BASOPHILS # BLD: 0.04 K/UL (ref 0–0.12)
EOSINOPHIL # BLD AUTO: 0.19 K/UL (ref 0–0.51)
EOSINOPHIL NFR BLD: 3.3 % (ref 0–6.9)
ERYTHROCYTE [DISTWIDTH] IN BLOOD BY AUTOMATED COUNT: 40.2 FL (ref 35.9–50)
HCT VFR BLD AUTO: 30.9 % (ref 42–52)
HGB BLD-MCNC: 10.3 G/DL (ref 14–18)
IMM GRANULOCYTES # BLD AUTO: 0.07 K/UL (ref 0–0.11)
IMM GRANULOCYTES NFR BLD AUTO: 1.2 % (ref 0–0.9)
LYMPHOCYTES # BLD AUTO: 2.27 K/UL (ref 1–4.8)
LYMPHOCYTES NFR BLD: 39.4 % (ref 22–41)
MCH RBC QN AUTO: 29.9 PG (ref 27–33)
MCHC RBC AUTO-ENTMCNC: 33.3 G/DL (ref 32.3–36.5)
MCV RBC AUTO: 89.8 FL (ref 81.4–97.8)
MONOCYTES # BLD AUTO: 0.56 K/UL (ref 0–0.85)
MONOCYTES NFR BLD AUTO: 9.7 % (ref 0–13.4)
NEUTROPHILS # BLD AUTO: 2.63 K/UL (ref 1.82–7.42)
NEUTROPHILS NFR BLD: 45.7 % (ref 44–72)
NRBC # BLD AUTO: 0 K/UL
NRBC BLD-RTO: 0 /100 WBC (ref 0–0.2)
PLATELET # BLD AUTO: 293 K/UL (ref 164–446)
PMV BLD AUTO: 9.4 FL (ref 9–12.9)
RBC # BLD AUTO: 3.44 M/UL (ref 4.7–6.1)
WBC # BLD AUTO: 5.8 K/UL (ref 4.8–10.8)

## 2025-01-05 PROCEDURE — 85025 COMPLETE CBC W/AUTO DIFF WBC: CPT

## 2025-01-05 PROCEDURE — 36415 COLL VENOUS BLD VENIPUNCTURE: CPT

## 2025-01-05 PROCEDURE — 700111 HCHG RX REV CODE 636 W/ 250 OVERRIDE (IP): Mod: JZ | Performed by: SURGERY

## 2025-01-05 PROCEDURE — 700102 HCHG RX REV CODE 250 W/ 637 OVERRIDE(OP)

## 2025-01-05 PROCEDURE — 99024 POSTOP FOLLOW-UP VISIT: CPT

## 2025-01-05 PROCEDURE — A9270 NON-COVERED ITEM OR SERVICE: HCPCS | Performed by: SURGERY

## 2025-01-05 PROCEDURE — 94660 CPAP INITIATION&MGMT: CPT

## 2025-01-05 PROCEDURE — A9270 NON-COVERED ITEM OR SERVICE: HCPCS

## 2025-01-05 PROCEDURE — 700102 HCHG RX REV CODE 250 W/ 637 OVERRIDE(OP): Performed by: SURGERY

## 2025-01-05 RX ADMIN — POTASSIUM CHLORIDE 20 MEQ: 1500 TABLET, EXTENDED RELEASE ORAL at 04:29

## 2025-01-05 RX ADMIN — OXYCODONE HYDROCHLORIDE 10 MG: 10 TABLET ORAL at 11:01

## 2025-01-05 RX ADMIN — AMOXICILLIN AND CLAVULANATE POTASSIUM 1 TABLET: 875; 125 TABLET, FILM COATED ORAL at 09:25

## 2025-01-05 RX ADMIN — KETOROLAC TROMETHAMINE 15 MG: 15 INJECTION, SOLUTION INTRAMUSCULAR; INTRAVENOUS at 04:29

## 2025-01-05 ASSESSMENT — PAIN DESCRIPTION - PAIN TYPE
TYPE: SURGICAL PAIN

## 2025-01-05 NOTE — PROGRESS NOTES
Pt picked up by wife for d/c. Pt has all belongings with them, and verbalizes understanding of d/c teaching. PIV removed. [id number] delivered.

## 2025-01-05 NOTE — PROGRESS NOTES
Post Operative Day 2 laparoscopic appendectomy.     Patient doing well.  Adequate pain control.  WBC 5.8 after transition to p.o. antibiotics.  Tolerating a GI soft diet.  Having multiple bowel movements.  Ambulating.    Alert.  Respiratory rate even and unlabored.  Abdomen is soft with appropriate tenderness at surgical sites.  Pelvic surgical dressing with small amount of dried blood.  All other surgical dressings C/D/I    Cleared for discharge home.  Discussed with patient the importance of completing his antibiotic course.  Additionally, he was educated on weight lifting restrictions.  Discussed over-the-counter pain medication, prescribed pain medication and follow-up appointment.

## 2025-01-05 NOTE — CARE PLAN
The patient is Stable - Low risk of patient condition declining or worsening    Shift Goals  Clinical Goals: pain control, comfort, d/c  Patient Goals: d/c, pain control  Family Goals: n/a    Progress made toward(s) clinical / shift goals:       Problem: Pain - Standard  Goal: Alleviation of pain or a reduction in pain to the patient’s comfort goal  Description: Target End Date:  Prior to discharge or change in level of care    Document on Vitals flowsheet    1.  Document pain using the appropriate pain scale per order or unit policy  2.  Educate and implement non-pharmacologic comfort measures (i.e. relaxation, distraction, massage, cold/heat therapy, etc.)  3.  Pain management medications as ordered  4.  Reassess pain after pain med administration per policy  5.  If opiods administered assess patient's response to pain medication is appropriate per POSS sedation scale  6.  Follow pain management plan developed in collaboration with patient and interdisciplinary team (including palliative care or pain specialists if applicable)  Outcome: Progressing     Problem: Knowledge Deficit - Standard  Goal: Patient and family/care givers will demonstrate understanding of plan of care, disease process/condition, diagnostic tests and medications  Description: Target End Date:  1-3 days or as soon as patient condition allows    Document in Patient Education    1.  Patient and family/caregiver oriented to unit, equipment, visitation policy and means for communicating concern  2.  Complete/review Learning Assessment  3.  Assess knowledge level of disease process/condition, treatment plan, diagnostic tests and medications  4.  Explain disease process/condition, treatment plan, diagnostic tests and medications  Outcome: Progressing    Patient is not progressing towards the following goals:

## 2025-01-05 NOTE — DISCHARGE PLANNING
Medically cleared to discharge home on PO ABX and outpatient follow up.   Discharge order placed by provider.   He is ambulating well without assistive devices/personnel.   Patient has confirmed he will have transportation home.   [facility] paperwork has been submitted by previous .   Anticipating no further case management needs prior to discharge.     Case Management Discharge Planning    Admission Date: 1/2/2025  GMLOS: 1.9  ALOS: 3    6-Clicks ADL Score: 22  6-Clicks Mobility Score: 22      Anticipated Discharge Dispo: Discharge Disposition: Discharged to home/self care (01)    DME Needed: No    Action(s) Taken: Updated Provider/Nurse on Discharge Plan    Escalations Completed: None    Medically Clear: Yes    Next Steps: Anticipating no case management needs prior to discharge.     Barriers to Discharge: None    Is the patient up for discharge tomorrow: Today, 1/5/2025, via private transportation.

## 2025-01-05 NOTE — CARE PLAN
The patient is Stable - Low risk of patient condition declining or worsening    Shift Goals  Clinical Goals: pain mgmt, mobility  Patient Goals: rest, comfort  Family Goals: not present    Progress made toward(s) clinical / shift goals:        Problem: Pain - Standard  Goal: Alleviation of pain or a reduction in pain to the patient’s comfort goal  Outcome: Progressing     Problem: Knowledge Deficit - Standard  Goal: Patient and family/care givers will demonstrate understanding of plan of care, disease process/condition, diagnostic tests and medications  Outcome: Progressing       Patient is not progressing towards the following goals:

## 2025-01-05 NOTE — DISCHARGE SUMMARY
DISCHARGE  SUMMARY    DATE OF ADMISSION: 1/2/2025    DATE OF DISCHARGE: 1/5/2025    DISCHARGE DIAGNOSIS:  Principal Problem:    Perforated appendicitis  Active Problems:    Appendicitis, acute  Resolved Problems:    * No resolved hospital problems. *      CONSULTATIONS:  None    PROCEDURES:  Date of procedure 1/2/2025, performed by Dr. Keiko Rivera: Laparoscopic appendectomy.    BRIEF HPI and HOSPITAL COURSE:  44-year-old male presented to the emergency department with right lower quadrant abdominal pain for 4 days.  Radiologic imaging demonstrated acute appendicitis with possible perforation of the distal appendix.  Patient was placed on IV antibiotics and proceeded to the operating suite for the above procedure.  For details regarding the operation please refer to Dr. Rivera's postoperative report.  With source control, his white blood cell count improved and the patient was transitioned to p.o. Augmentin.  On the day of discharge, WBC was stable after transitioning to p.o. Augmentin, he was tolerating a GI soft diet.  Surgical port sites are well-approximated with Steri-Strips and have dressing over them. He has normal bladder and bowel function, he is ambulatory and has adequate pain control.  He was educated on weight lifting restrictions, over-the-counter pain medication, prescribed pain medication and the importance of completing his antibiotic course.  Additionally, we discussed follow-up appts.    DISPOSITION:   Discharged home in stable condition on 1/5/2025. The patient and family were counseled and questions were answered. Specifically, signs and symptoms of infection, respiratory decompensation and persistent or worsening pain were discussed and the patient agrees to seek medical attention if any of these develop.    DISCHARGE MEDICATIONS:  The patients controlled substance history was reviewed and a controlled substance use informed consent (if applicable) was provided by [facility] and the patient has been prescribed.     Medication List        START taking these medications        Instructions   amoxicillin-clavulanate 875-125 MG Tabs  Commonly known as: Augmentin   Take 1 Tablet by mouth every 12 hours for 3 days.  Dose: 1 Tablet     ibuprofen 600 MG Tabs  Commonly known as: Motrin   Take 1 Tablet by mouth every 6 hours as needed for Moderate Pain, Mild Pain or Inflammation. Take as directed on the bottle. Take as needed for pain  Dose: 600 mg     oxyCODONE immediate-release 5 MG Tabs  Commonly known as: Roxicodone   Take 1 Tablet by mouth every 6 hours as needed for Severe Pain for up to 3 days.  Dose: 5 mg     senna-docusate 8.6-50 MG Tabs  Commonly known as: Pericolace Or Senokot S   Take 1 Tablet by mouth every day. Take daily while on narcotics to avoid constipation  Dose: 1 Tablet            CONTINUE taking these medications        Instructions   TYLENOL PO   Take 2 Tablets by mouth one time as needed (pain).  Dose: 2 Tablet            ASK your doctor about these medications        Instructions   fenofibrate 145 MG Tabs  Commonly known as: Tricor   Take 1 Tablet by mouth every day.  Dose: 145 mg     sertraline 25 MG tablet  Commonly known as: Zoloft   Take 1 Tablet by mouth every day.  Dose: 25 mg            You will be given a prescription for pain medication at discharge. Please take these as directed. It is important to remember not to take medications on an empty stomach as this may cause nausea.  You may also take over the counter acetaminophen and/or NSAIDS (ibuprofen, Aleve, Advil, Motrin) per the package instructions.  You may also use ice to the wound to decrease pain and swelling. You may alternate 20 minutes on and 20 minutes off with the ice for the first 24-48 hours. Make sure you place a washcloth or towel between the ice pack and your skin.  Please note that narcotic pain medication cannot be refilled unless you are seen by a doctor. Make sure you call the office  if you are running low on medication or if the dose you have been prescribed is not working well for you.    ACTIVITY:  After discharge from the hospital, you may resume full routine activities; however, there should be no heavy lifting (greater than 20 pounds or a bag of groceries) and no strenuous activities for at least 2 weeks. The duration may be longer, depending on your surgical procedure. Routine activities of daily living are acceptable. Activity level should be addressed at your post-op follow up appointment. You may drive whenever you are off pain medications and are able to perform the activities needed to drive, i.e., turning, bending, twisting, etc.    WOUND CARE:  You may shower, but do not submerge in a bath for at least two weeks. If you have wound dressings, they may come off after 48 hours. If you have skin glue to the wound, this will fall off on its own, do not pick at it. If you have steri strips to the wound, these will fall off on their own, do not pick at them, may trim the edges if needed.    DIET:  Upon discharge from the hospital, you may resume your normal preoperative diet, unless specifically directed otherwise. Depending on how you are feeling and whether you have nausea or not, you may wish to stay with a bland diet for the first few days. However, you can advance this as quickly as you feel ready.    FOLLOW UP:  Keiko Rivera M.D.  [address] [facility] [address]  [place] [id number] [phone]    Follow up on 1/10/2025      Asuncion Jacob M.D.  [address]  [facility] [address]  [facility] [id number] [phone]    Follow up  Follow-up in 1 to 2 weeks to inform them of your surgery.    Call the office if you have: (1) Fevers to more than 101F, (2) Unusual chest or leg pain, (3) Drainage or fluid from incision that may be foul smelling, increased tenderness or soreness at the wound or the wound edges are no longer together, redness or swelling at the incision site. Do not hesitate to call with any  other questions.    TIME SPENT ON DISCHARGE: 35 minutes      ____________________________________________  Viji Mccarthy D.N.P.    DD: 1/5/2025 7:15 AM

## 2025-01-06 ENCOUNTER — TELEPHONE (OUTPATIENT)
Dept: HEALTH INFORMATION MANAGEMENT | Facility: OTHER | Age: 45
End: 2025-01-06
Payer: COMMERCIAL

## 2025-01-07 LAB
BACTERIA BLD CULT: NORMAL
BACTERIA BLD CULT: NORMAL
SIGNIFICANT IND 70042: NORMAL
SIGNIFICANT IND 70042: NORMAL
SITE SITE: NORMAL
SITE SITE: NORMAL
SOURCE SOURCE: NORMAL
SOURCE SOURCE: NORMAL

## 2025-01-17 ENCOUNTER — OFFICE VISIT (OUTPATIENT)
Dept: SURGERY | Facility: MEDICAL CENTER | Age: 45
End: 2025-01-17
Attending: SURGERY
Payer: COMMERCIAL

## 2025-01-17 VITALS
HEART RATE: 100 BPM | OXYGEN SATURATION: 96 % | TEMPERATURE: 98.8 F | HEIGHT: 70 IN | SYSTOLIC BLOOD PRESSURE: 128 MMHG | WEIGHT: 259.92 LBS | DIASTOLIC BLOOD PRESSURE: 84 MMHG | BODY MASS INDEX: 37.21 KG/M2

## 2025-01-17 DIAGNOSIS — K35.32 PERFORATED APPENDICITIS: ICD-10-CM

## 2025-01-17 PROCEDURE — 99024 POSTOP FOLLOW-UP VISIT: CPT

## 2025-01-17 ASSESSMENT — FIBROSIS 4 INDEX: FIB4 SCORE: 0.58

## 2025-01-17 NOTE — PROGRESS NOTES
"      HISTORY OF PRESENT ILLNESS: The patient is a 44 year-old man who returns to the [facility] for routine follow-up after undergoing a Laparoscopic Appendectomy for Acute appendicitis on 1/02/2025 by Keiko Rivera MD. Since discharge from the hospital he is tolerating a regular diet and having normal bowel movements. He is occasionally using over the counter Tylenol and Ibuprofen as needed. He denies fevers, chills, nausea and vomiting. He plans to return to work on Monday 1/20/25.     PHYSICAL EXAMINATION:  Vital Signs: /84 (BP Location: Left arm, Patient Position: Sitting, BP Cuff Size: Child)   Pulse 100   Temp 37.1 °C (98.8 °F) (Temporal)   Ht 1.778 m (5' 10\")   Wt 118 kg (259 lb 14.8 oz)   SpO2 96%   Physical Exam  Vitals and nursing note reviewed.   Constitutional:       Appearance: He is overweight. He is not ill-appearing.   HENT:      Mouth/Throat:      Mouth: Mucous membranes are moist.   Eyes:      Pupils: Pupils are equal, round, and reactive to light.   Cardiovascular:      Rate and Rhythm: Normal rate.      Pulses: Normal pulses.   Pulmonary:      Effort: Pulmonary effort is normal. No respiratory distress.   Abdominal:      General: There is no distension.      Palpations: Abdomen is soft.      Tenderness: There is no abdominal tenderness. There is no guarding.      Comments: Rounded abdomen  Surgical port sites healing well. No signs of infection. Large bruise to mid abdomen   Musculoskeletal:         General: Normal range of motion.   Skin:     General: Skin is warm.      Capillary Refill: Capillary refill takes less than 2 seconds.      Findings: Bruising present.   Neurological:      General: No focal deficit present.      Mental Status: He is alert.   Psychiatric:         Mood and Affect: Mood normal.       PATHOLOGY: Final pathology demonstrated Acute suppurative and necrotizing appendicitis with acute serositis and fibrinopurulent exudate, clinically " perforated.    ASSESSMENT AND PLAN:  1. Perforated appendicitis    Final postoperative instructions provided. Lifting restrictions reviewed. Discharge from the [facility]. Follow up with PCP as needed.       ____________________________________     Viji Mccarthy D.N.P.    DD: 1/17/2025  1:42 PM

## 2025-04-21 ENCOUNTER — OFFICE VISIT (OUTPATIENT)
Dept: MEDICAL GROUP | Facility: PHYSICIAN GROUP | Age: 45
End: 2025-04-21
Payer: COMMERCIAL

## 2025-04-21 VITALS
SYSTOLIC BLOOD PRESSURE: 110 MMHG | WEIGHT: 255 LBS | TEMPERATURE: 98.7 F | RESPIRATION RATE: 18 BRPM | DIASTOLIC BLOOD PRESSURE: 62 MMHG | HEIGHT: 70 IN | OXYGEN SATURATION: 95 % | HEART RATE: 92 BPM | BODY MASS INDEX: 36.51 KG/M2

## 2025-04-21 DIAGNOSIS — F33.1 MODERATE EPISODE OF RECURRENT MAJOR DEPRESSIVE DISORDER (HCC): ICD-10-CM

## 2025-04-21 DIAGNOSIS — Z01.84 IMMUNITY STATUS TESTING: ICD-10-CM

## 2025-04-21 DIAGNOSIS — E66.01 CLASS 2 SEVERE OBESITY DUE TO EXCESS CALORIES WITH SERIOUS COMORBIDITY AND BODY MASS INDEX (BMI) OF 36.0 TO 36.9 IN ADULT (HCC): ICD-10-CM

## 2025-04-21 DIAGNOSIS — R53.82 CHRONIC FATIGUE: ICD-10-CM

## 2025-04-21 DIAGNOSIS — R09.81 CHRONIC NASAL CONGESTION: ICD-10-CM

## 2025-04-21 DIAGNOSIS — F90.2 ATTENTION DEFICIT HYPERACTIVITY DISORDER (ADHD), COMBINED TYPE: ICD-10-CM

## 2025-04-21 DIAGNOSIS — Z11.3 SCREENING EXAMINATION FOR SEXUALLY TRANSMITTED DISEASE: ICD-10-CM

## 2025-04-21 DIAGNOSIS — E29.1 HYPOGONADISM IN MALE: ICD-10-CM

## 2025-04-21 DIAGNOSIS — E78.1 HYPERTRIGLYCERIDEMIA: ICD-10-CM

## 2025-04-21 DIAGNOSIS — Z00.00 WELLNESS EXAMINATION: ICD-10-CM

## 2025-04-21 DIAGNOSIS — E66.812 CLASS 2 SEVERE OBESITY DUE TO EXCESS CALORIES WITH SERIOUS COMORBIDITY AND BODY MASS INDEX (BMI) OF 36.0 TO 36.9 IN ADULT (HCC): ICD-10-CM

## 2025-04-21 DIAGNOSIS — G47.33 OSA (OBSTRUCTIVE SLEEP APNEA): ICD-10-CM

## 2025-04-21 DIAGNOSIS — Z11.59 NEED FOR HEPATITIS C SCREENING TEST: ICD-10-CM

## 2025-04-21 PROCEDURE — 3074F SYST BP LT 130 MM HG: CPT | Performed by: FAMILY MEDICINE

## 2025-04-21 PROCEDURE — 3078F DIAST BP <80 MM HG: CPT | Performed by: FAMILY MEDICINE

## 2025-04-21 PROCEDURE — 99215 OFFICE O/P EST HI 40 MIN: CPT | Performed by: FAMILY MEDICINE

## 2025-04-21 RX ORDER — AZELASTINE 1 MG/ML
1 SPRAY, METERED NASAL 2 TIMES DAILY
Qty: 30 ML | Refills: 11 | Status: SHIPPED | OUTPATIENT
Start: 2025-04-21

## 2025-04-21 RX ORDER — FLUTICASONE PROPIONATE 50 MCG
1 SPRAY, SUSPENSION (ML) NASAL DAILY
COMMUNITY

## 2025-04-21 RX ORDER — MONTELUKAST SODIUM 10 MG/1
10 TABLET ORAL DAILY
Qty: 90 TABLET | Refills: 3 | Status: SHIPPED | OUTPATIENT
Start: 2025-04-21

## 2025-04-21 RX ORDER — FENOFIBRATE 145 MG/1
145 TABLET, FILM COATED ORAL DAILY
Qty: 90 TABLET | Refills: 3 | Status: SHIPPED | OUTPATIENT
Start: 2025-04-21

## 2025-04-21 RX ORDER — GUANFACINE 1 MG/1
1 TABLET, EXTENDED RELEASE ORAL DAILY
Qty: 90 TABLET | Refills: 3 | Status: SHIPPED | OUTPATIENT
Start: 2025-04-21

## 2025-04-21 RX ORDER — FEXOFENADINE HCL 180 MG/1
180 TABLET ORAL DAILY
Qty: 90 TABLET | Refills: 3 | Status: SHIPPED | OUTPATIENT
Start: 2025-04-21

## 2025-04-21 ASSESSMENT — PATIENT HEALTH QUESTIONNAIRE - PHQ9
5. POOR APPETITE OR OVEREATING: NEARLY EVERY DAY
2. FEELING DOWN, DEPRESSED, IRRITABLE, OR HOPELESS: NOT AT ALL
8. MOVING OR SPEAKING SO SLOWLY THAT OTHER PEOPLE COULD HAVE NOTICED. OR THE OPPOSITE, BEING SO FIGETY OR RESTLESS THAT YOU HAVE BEEN MOVING AROUND A LOT MORE THAN USUAL: NOT AT ALL
9. THOUGHTS THAT YOU WOULD BE BETTER OFF DEAD, OR OF HURTING YOURSELF: NOT AT ALL
7. TROUBLE CONCENTRATING ON THINGS, SUCH AS READING THE NEWSPAPER OR WATCHING TELEVISION: NEARLY EVERY DAY
4. FEELING TIRED OR HAVING LITTLE ENERGY: NEARLY EVERY DAY
3. TROUBLE FALLING OR STAYING ASLEEP OR SLEEPING TOO MUCH: NEARLY EVERY DAY
1. LITTLE INTEREST OR PLEASURE IN DOING THINGS: NOT AT ALL
6. FEELING BAD ABOUT YOURSELF - OR THAT YOU ARE A FAILURE OR HAVE LET YOURSELF OR YOUR FAMILY DOWN: NOT AL ALL
SUM OF ALL RESPONSES TO PHQ QUESTIONS 1-9: 12
SUM OF ALL RESPONSES TO PHQ9 QUESTIONS 1 AND 2: 0

## 2025-04-21 ASSESSMENT — FIBROSIS 4 INDEX: FIB4 SCORE: 0.58

## 2025-04-22 DIAGNOSIS — G47.33 OSA (OBSTRUCTIVE SLEEP APNEA): ICD-10-CM

## 2025-04-22 NOTE — PROGRESS NOTES
Verbal consent was acquired by the patient to use [employer] ambient listening note generation during this visit     Subjective:     HPI:   History of Present Illness  The patient presents for evaluation of insomnia, brain fog, weight loss, sleep apnea, allergic rhinitis, and depression.    Insomnia  - He has been experiencing insomnia and cognitive difficulties, including memory lapses and difficulty focusing, which have progressively worsened over the past few years.  - He reports a need to consume large meals before bedtime to facilitate sleep.  - Despite feeling fatigued, he struggles to fall asleep and often wakes up after a short period due to the need to urinate.  - He notes that his mind is always going   - Notes that he is typically fidgeting and did not do well in school because he could not focus    Brain Fog/Chronic fatigue  - He has been experiencing concentration difficulties since his 40s, which were also present during his school years.    Weight Loss  - He is interested in exploring weight loss options, specifically Ozempic, as he aims to lose approximately 30 pounds.  - Despite dietary modifications, increased physical activity, and cessation of alcohol consumption, he has not observed any significant weight loss.    Sleep Apnea  - He has been informed by [facility] that his insurance does not cover his CPAP supplies, which he has been paying for out-of-pocket for the past few months.  - He uses his CPAP machine nightly and reports that it is effective.    Allergic Rhinitis  - He relies heavily on Flonase for nasal congestion, particularly before bedtime.  - He was previously informed by his physician that he has a deviated septum in his right nostril.  - He uses a humidifier nightly and takes Claritin as needed for allergies.    Depression  - He has discontinued Zoloft and is currently not on any medications.  - He has been attending weekly therapy sessions, which he finds beneficial.  - He recently  "finalized his divorce and has been preparing for this transition with his therapist's assistance.  - He does not endorse feelings of depression or hopelessness and reports an improvement in his overall well-being compared to previous years.  - He does not endorse suicidal ideation.    Supplemental information: He is considering resuming testosterone therapy. He has not been on fenofibrate and wants to get back on it. He reports that it helped him get rid of a lump on his breast.    SOCIAL HISTORY  He quit drinking alcohol.      Objective:     Exam:  /62 (BP Location: Right arm, Patient Position: Sitting, BP Cuff Size: Adult)   Pulse 92   Temp 37.1 °C (98.7 °F) (Temporal)   Resp 18   Ht 1.778 m (5' 10\")   Wt 116 kg (255 lb)   SpO2 95%   BMI 36.59 kg/m²  Body mass index is 36.59 kg/m².    Physical Exam  Constitutional:       Appearance: Normal appearance.   HENT:      Head: Normocephalic and atraumatic.      Nose: Septal deviation present.      Right Nostril: Epistaxis present.      Left Nostril: Epistaxis present.      Right Turbinates: Enlarged, swollen and pale.      Left Turbinates: Enlarged, swollen and pale.      Mouth/Throat:      Mouth: Mucous membranes are moist.   Eyes:      Extraocular Movements: Extraocular movements intact.      Conjunctiva/sclera: Conjunctivae normal.      Pupils: Pupils are equal, round, and reactive to light.   Cardiovascular:      Rate and Rhythm: Normal rate and regular rhythm.   Pulmonary:      Effort: Pulmonary effort is normal.   Abdominal:      General: Abdomen is flat. Bowel sounds are normal.      Palpations: Abdomen is soft.   Musculoskeletal:      Cervical back: Neck supple.   Skin:     General: Skin is warm and dry.   Neurological:      General: No focal deficit present.      Mental Status: He is alert and oriented to person, place, and time.   Psychiatric:         Mood and Affect: Mood normal.             Results      Assessment & Plan:     1. Hypogonadism in " male  Testosterone, Free & Total, Adult Male (w/SHBG)    PROSTATE SPECIFIC AG SCREENING      2. TOMER (obstructive sleep apnea)  Tirzepatide-Weight Management 2.5 MG/0.5ML Solution vial    CBC WITH DIFFERENTIAL      3. Class 2 severe obesity due to excess calories with serious comorbidity and body mass index (BMI) of 36.0 to 36.9 in adult (HCC)  Tirzepatide-Weight Management 2.5 MG/0.5ML Solution vial      4. Hypertriglyceridemia  fenofibrate (TRICOR) 145 MG Tab    Lipid Profile      5. Wellness examination  Lipid Profile    INSULIN FASTING    TSH WITH REFLEX TO FT4    CBC WITH DIFFERENTIAL    Comp Metabolic Panel    HEMOGLOBIN A1C      6. Need for hepatitis C screening test  HEP C VIRUS ANTIBODY      7. Screening examination for sexually transmitted disease  HEP C VIRUS ANTIBODY    HIV AG/AB COMBO ASSAY SCREENING      8. Chronic fatigue  TSH WITH REFLEX TO FT4    CBC WITH DIFFERENTIAL    Comp Metabolic Panel    Testosterone, Free & Total, Adult Male (w/SHBG)    VITAMIN D,25 HYDROXY (DEFICIENCY)    VIT B12,  FOLIC ACID      9. Immunity status testing  HEP B SURFACE AB      10. Moderate episode of recurrent major depressive disorder (HCC)        11. Attention deficit hyperactivity disorder (ADHD), combined type  guanFACINE ER (INTUNIV) 1 MG TABLET SR 24 HR tablet      12. Chronic nasal congestion  azelastine (ASTELIN) 0.1 % nasal spray    fexofenadine (ALLEGRA) 180 MG tablet    montelukast (SINGULAIR) 10 MG Tab          Assessment & Plan  1. Insomnia/Brain fog:  - Chronic and likely multifactorial    - Depression/Anxiety from ADHD, TOMER, BPH, Nasal congestion  - Difficulty sleeping and focusing, exacerbated by brain fog.  - Guanfacine will be prescribed to potentially alleviate insomnia if ADHD is contributing.  - Magnesium supplement suggested as a potential aid for insomnia.  - Continue using CPAP  - Will start treating nasal congestion    2. ADHD:  - Persistent brain fog affecting concentration, particularly at  work.  - Guanfacine will be prescribed to help with this symptom of insomnia to see if this improves.  - Screening done in office positive for ADHD  - Discussion about ADHD diagnosis and potential treatments, including non-stimulant options.      3. Weight loss/Obesity:  Chronic  Discussed treatment methods  -lifestyle modifications   --diet and exercise  -medications (including costs and SE)  --GLP-1  --Orlistat  --Phentermine  --Metformin  -surgery  Patient has decided  to try a GLP-1  - If Zepbound is not covered, other options such as phentermine or Vyvanse may be considered.  - Patient has quit drinking and is exercising, but still struggling to lose weight.    4. Sleep apnea:  - Using CPAP machine every night and reports it is working well.  - Request for additional CPAP supplies will be sent to [facility].  - Last sleep study was almost a year ago; follow-up with sleep medicine may be necessary.  - Elevated fasting sugars noted; potential impact on CPAP supply coverage.  - Will start patient on Zepbound as well    5. Allergic rhinitis/Nasal congestion:  - Extensive use of Flonase resulting in raw and irritated nasal passages.  - Astelin nasal spray will be prescribed in addition to Flonase.  - Allegra and Singulair will be initiated to manage allergies.  - Referral to ENT specialist may be necessary if symptoms persist.    6. Depression:  - Attending weekly therapy sessions and reports improvement in mood.  - No current medication is being taken for depression.  - Therapy has been helpful, especially post-divorce.  - Can consider Strattera if needed in the future    7. Elevated triglycerides:  - Will restart fenofibrate as it previously helped manage triglyceride levels.  - Noted improvement in symptoms such as disappearance of a lump on the breast.    8. Testosterone management:  - Blood work order will be placed to assess testosterone levels.  - Depending on results, resuming testosterone therapy may be  considered.  - Patient has experienced fatigue, potentially related to testosterone levels.        I spent a total of 42 minutes with record review, exam, communication with the patient, communication with other providers, and documentation of this encounter.    Return in about 6 weeks (around 6/2/2025).    Please note that this dictation was created using voice recognition software. I have made every reasonable attempt to correct obvious errors, but I expect that there are errors of grammar and possibly content that I did not discover before finalizing the note.      I personally reviewed and updated and reviewed the patient's personal, social, family and surgical history at today's appointment.

## 2025-07-14 ENCOUNTER — HOSPITAL ENCOUNTER (OUTPATIENT)
Dept: LAB | Facility: MEDICAL CENTER | Age: 45
End: 2025-07-14
Attending: FAMILY MEDICINE
Payer: COMMERCIAL

## 2025-07-14 DIAGNOSIS — R53.82 CHRONIC FATIGUE: ICD-10-CM

## 2025-07-14 DIAGNOSIS — Z11.59 NEED FOR HEPATITIS C SCREENING TEST: ICD-10-CM

## 2025-07-14 DIAGNOSIS — E29.1 HYPOGONADISM IN MALE: ICD-10-CM

## 2025-07-14 DIAGNOSIS — Z11.3 SCREENING EXAMINATION FOR SEXUALLY TRANSMITTED DISEASE: ICD-10-CM

## 2025-07-14 DIAGNOSIS — Z01.84 IMMUNITY STATUS TESTING: ICD-10-CM

## 2025-07-14 DIAGNOSIS — Z00.00 WELLNESS EXAMINATION: ICD-10-CM

## 2025-07-14 DIAGNOSIS — E78.1 HYPERTRIGLYCERIDEMIA: ICD-10-CM

## 2025-07-14 DIAGNOSIS — G47.33 OSA (OBSTRUCTIVE SLEEP APNEA): ICD-10-CM

## 2025-07-14 LAB
25(OH)D3 SERPL-MCNC: 56 NG/ML (ref 30–100)
ALBUMIN SERPL BCP-MCNC: 4.5 G/DL (ref 3.2–4.9)
ALBUMIN/GLOB SERPL: 1.7 G/DL
ALP SERPL-CCNC: 79 U/L (ref 30–99)
ALT SERPL-CCNC: 83 U/L (ref 2–50)
ANION GAP SERPL CALC-SCNC: 13 MMOL/L (ref 7–16)
AST SERPL-CCNC: 38 U/L (ref 12–45)
BASOPHILS # BLD AUTO: 0.5 % (ref 0–1.8)
BASOPHILS # BLD: 0.03 K/UL (ref 0–0.12)
BILIRUB SERPL-MCNC: <0.2 MG/DL (ref 0.1–1.5)
BUN SERPL-MCNC: 21 MG/DL (ref 8–22)
CALCIUM ALBUM COR SERPL-MCNC: 8.8 MG/DL (ref 8.5–10.5)
CALCIUM SERPL-MCNC: 9.2 MG/DL (ref 8.5–10.5)
CHLORIDE SERPL-SCNC: 105 MMOL/L (ref 96–112)
CHOLEST SERPL-MCNC: 283 MG/DL (ref 100–199)
CO2 SERPL-SCNC: 21 MMOL/L (ref 20–33)
CREAT SERPL-MCNC: 1.15 MG/DL (ref 0.5–1.4)
EOSINOPHIL # BLD AUTO: 0.12 K/UL (ref 0–0.51)
EOSINOPHIL NFR BLD: 2.2 % (ref 0–6.9)
ERYTHROCYTE [DISTWIDTH] IN BLOOD BY AUTOMATED COUNT: 39.4 FL (ref 35.9–50)
EST. AVERAGE GLUCOSE BLD GHB EST-MCNC: 108 MG/DL
GFR SERPLBLD CREATININE-BSD FMLA CKD-EPI: 80 ML/MIN/1.73 M 2
GLOBULIN SER CALC-MCNC: 2.6 G/DL (ref 1.9–3.5)
GLUCOSE SERPL-MCNC: 101 MG/DL (ref 65–99)
HBA1C MFR BLD: 5.4 % (ref 4–5.6)
HBV SURFACE AB SERPL IA-ACNC: <3.5 MIU/ML (ref 0–10)
HCT VFR BLD AUTO: 45 % (ref 42–52)
HCV AB SER QL: NORMAL
HDLC SERPL-MCNC: 24 MG/DL
HGB BLD-MCNC: 15.4 G/DL (ref 14–18)
HIV 1+2 AB+HIV1 P24 AG SERPL QL IA: NORMAL
IMM GRANULOCYTES # BLD AUTO: 0.01 K/UL (ref 0–0.11)
IMM GRANULOCYTES NFR BLD AUTO: 0.2 % (ref 0–0.9)
LDLC SERPL CALC-MCNC: ABNORMAL MG/DL
LYMPHOCYTES # BLD AUTO: 2.13 K/UL (ref 1–4.8)
LYMPHOCYTES NFR BLD: 38.5 % (ref 22–41)
MCH RBC QN AUTO: 30 PG (ref 27–33)
MCHC RBC AUTO-ENTMCNC: 34.2 G/DL (ref 32.3–36.5)
MCV RBC AUTO: 87.5 FL (ref 81.4–97.8)
MONOCYTES # BLD AUTO: 0.5 K/UL (ref 0–0.85)
MONOCYTES NFR BLD AUTO: 9 % (ref 0–13.4)
NEUTROPHILS # BLD AUTO: 2.74 K/UL (ref 1.82–7.42)
NEUTROPHILS NFR BLD: 49.6 % (ref 44–72)
NRBC # BLD AUTO: 0 K/UL
NRBC BLD-RTO: 0 /100 WBC (ref 0–0.2)
PLATELET # BLD AUTO: 339 K/UL (ref 164–446)
PMV BLD AUTO: 9.6 FL (ref 9–12.9)
POTASSIUM SERPL-SCNC: 4.1 MMOL/L (ref 3.6–5.5)
PROT SERPL-MCNC: 7.1 G/DL (ref 6–8.2)
PSA SERPL DL<=0.01 NG/ML-MCNC: 1.03 NG/ML (ref 0–4)
RBC # BLD AUTO: 5.14 M/UL (ref 4.7–6.1)
SODIUM SERPL-SCNC: 139 MMOL/L (ref 135–145)
TRIGL SERPL-MCNC: 1033 MG/DL (ref 0–149)
TSH SERPL DL<=0.005 MIU/L-ACNC: 1.36 UIU/ML (ref 0.38–5.33)
WBC # BLD AUTO: 5.5 K/UL (ref 4.8–10.8)

## 2025-07-14 PROCEDURE — 84443 ASSAY THYROID STIM HORMONE: CPT

## 2025-07-14 PROCEDURE — 86706 HEP B SURFACE ANTIBODY: CPT

## 2025-07-14 PROCEDURE — 86803 HEPATITIS C AB TEST: CPT

## 2025-07-14 PROCEDURE — 83525 ASSAY OF INSULIN: CPT

## 2025-07-14 PROCEDURE — 84403 ASSAY OF TOTAL TESTOSTERONE: CPT

## 2025-07-14 PROCEDURE — 84402 ASSAY OF FREE TESTOSTERONE: CPT

## 2025-07-14 PROCEDURE — 80061 LIPID PANEL: CPT

## 2025-07-14 PROCEDURE — 80053 COMPREHEN METABOLIC PANEL: CPT

## 2025-07-14 PROCEDURE — 84153 ASSAY OF PSA TOTAL: CPT

## 2025-07-14 PROCEDURE — 85025 COMPLETE CBC W/AUTO DIFF WBC: CPT

## 2025-07-14 PROCEDURE — 83036 HEMOGLOBIN GLYCOSYLATED A1C: CPT

## 2025-07-14 PROCEDURE — 87389 HIV-1 AG W/HIV-1&-2 AB AG IA: CPT

## 2025-07-14 PROCEDURE — 82306 VITAMIN D 25 HYDROXY: CPT

## 2025-07-14 PROCEDURE — 84270 ASSAY OF SEX HORMONE GLOBUL: CPT

## 2025-07-14 PROCEDURE — 36415 COLL VENOUS BLD VENIPUNCTURE: CPT

## 2025-07-17 LAB
INSULIN P FAST SERPL-ACNC: 27 UIU/ML (ref 3–25)
SHBG SERPL-SCNC: 21 NMOL/L (ref 17–56)
TESTOST FREE MFR SERPL: 2.2 % (ref 1.6–2.9)
TESTOST FREE SERPL-MCNC: 47 PG/ML (ref 47–244)
TESTOST SERPL-MCNC: 216 NG/DL (ref 300–890)

## 2025-07-25 ENCOUNTER — APPOINTMENT (OUTPATIENT)
Dept: MEDICAL GROUP | Facility: PHYSICIAN GROUP | Age: 45
End: 2025-07-25
Payer: COMMERCIAL

## 2025-07-25 ENCOUNTER — HOSPITAL ENCOUNTER (OUTPATIENT)
Facility: MEDICAL CENTER | Age: 45
End: 2025-07-25
Attending: FAMILY MEDICINE
Payer: COMMERCIAL

## 2025-07-25 VITALS
HEIGHT: 70 IN | OXYGEN SATURATION: 97 % | HEART RATE: 71 BPM | RESPIRATION RATE: 18 BRPM | SYSTOLIC BLOOD PRESSURE: 124 MMHG | DIASTOLIC BLOOD PRESSURE: 78 MMHG | BODY MASS INDEX: 36.54 KG/M2 | TEMPERATURE: 97.7 F | WEIGHT: 255.2 LBS

## 2025-07-25 DIAGNOSIS — G47.33 OSA ON CPAP: ICD-10-CM

## 2025-07-25 DIAGNOSIS — Z51.81 THERAPEUTIC DRUG MONITORING: ICD-10-CM

## 2025-07-25 DIAGNOSIS — E66.9 OBESITY (BMI 30-39.9): ICD-10-CM

## 2025-07-25 DIAGNOSIS — F90.2 ATTENTION DEFICIT HYPERACTIVITY DISORDER (ADHD), COMBINED TYPE: ICD-10-CM

## 2025-07-25 DIAGNOSIS — R53.83 FATIGUE, UNSPECIFIED TYPE: ICD-10-CM

## 2025-07-25 DIAGNOSIS — E66.812 CLASS 2 SEVERE OBESITY DUE TO EXCESS CALORIES WITH SERIOUS COMORBIDITY AND BODY MASS INDEX (BMI) OF 36.0 TO 36.9 IN ADULT (HCC): ICD-10-CM

## 2025-07-25 DIAGNOSIS — R73.9 ELEVATED BLOOD SUGAR: ICD-10-CM

## 2025-07-25 DIAGNOSIS — Z80.0 FAMILY HISTORY OF COLON CANCER IN MOTHER: ICD-10-CM

## 2025-07-25 DIAGNOSIS — R79.89 LOW TESTOSTERONE: ICD-10-CM

## 2025-07-25 DIAGNOSIS — E66.01 CLASS 2 SEVERE OBESITY DUE TO EXCESS CALORIES WITH SERIOUS COMORBIDITY AND BODY MASS INDEX (BMI) OF 36.0 TO 36.9 IN ADULT (HCC): ICD-10-CM

## 2025-07-25 DIAGNOSIS — G47.33 OSA (OBSTRUCTIVE SLEEP APNEA): ICD-10-CM

## 2025-07-25 DIAGNOSIS — J30.2 SEASONAL ALLERGIES: ICD-10-CM

## 2025-07-25 DIAGNOSIS — R79.89 ELEVATED LFTS: ICD-10-CM

## 2025-07-25 DIAGNOSIS — F50.819 BINGE EATING DISORDER, UNSPECIFIED SEVERITY: ICD-10-CM

## 2025-07-25 DIAGNOSIS — R09.81 CHRONIC NASAL CONGESTION: Primary | ICD-10-CM

## 2025-07-25 DIAGNOSIS — E55.9 VITAMIN D DEFICIENCY: ICD-10-CM

## 2025-07-25 PROCEDURE — 99215 OFFICE O/P EST HI 40 MIN: CPT | Performed by: FAMILY MEDICINE

## 2025-07-25 PROCEDURE — G0482 DRUG TEST DEF 15-21 CLASSES: HCPCS

## 2025-07-25 PROCEDURE — 3078F DIAST BP <80 MM HG: CPT | Performed by: FAMILY MEDICINE

## 2025-07-25 PROCEDURE — 3074F SYST BP LT 130 MM HG: CPT | Performed by: FAMILY MEDICINE

## 2025-07-25 RX ORDER — TESTOSTERONE 40.5 MG/2.5G
40.5 GEL TOPICAL DAILY
Qty: 90 G | Refills: 0 | Status: SHIPPED | OUTPATIENT
Start: 2025-07-25 | End: 2025-10-23

## 2025-07-25 RX ORDER — LISDEXAMFETAMINE DIMESYLATE 30 MG/1
30 CAPSULE ORAL EVERY MORNING
Qty: 30 CAPSULE | Refills: 0 | Status: SHIPPED | OUTPATIENT
Start: 2025-07-25 | End: 2025-08-24

## 2025-07-25 RX ORDER — NALTREXONE HYDROCHLORIDE 50 MG/1
118 TABLET, FILM COATED ORAL DAILY
COMMUNITY

## 2025-07-25 ASSESSMENT — FIBROSIS 4 INDEX: FIB4 SCORE: 0.55

## 2025-07-25 NOTE — PROGRESS NOTES
Verbal consent was acquired by the patient to use [employer] ambient listening note generation during this visit     Subjective:     HPI:   History of Present Illness  The patient presents for evaluation of brain fog, focus, memory, insomnia, weight management, sleep apnea, allergies, testosterone deficiency, ADHD, and health maintenance.    Brain Fog, Focus, Memory, and Insomnia  - He reports experiencing brain fog, focus issues, memory lapses, and insomnia.  - These symptoms have been improving since he started exercising, which also helps him fall asleep faster.  - He has made dietary changes, reducing his intake of sugars and carbs.    Weight Management  - Despite quitting alcohol 7 months ago, he has not noticed any weight loss.  - He is currently on a weight loss program with naltrexone but has not seen significant results after a month.  - He tends to overeat towards the end of the day and exercises in the evening around 4:00 or 5:00 PM.  - His daily diet includes a cup of coffee in the morning, plenty of water throughout the day, and a ham sandwich for lunch.  - He has been following a keto diet for the past 2 weeks and has increased his water intake for the past month.  - He used to consume fast food and sodas regularly but has made significant changes to his diet over the past month.  - He recently returned from a family trip to [place] where he did not strictly follow his diet.  - He has not tried tirzepatide or Zepbound injections.  - He occasionally binge eats when he gets home.  - He is currently taking naltrexone 118 mg and bupropion 100 mg, which he reports leaves a bad taste in his mouth.    Allergies/Septal deviation  - He feels that his allergy medication and nasal spray are not effective.  - He has switched back to Afrin nasal spray to help him breathe at night.  - Will place ENT referral    Health Maintenance  - He is interested in getting screened for colon cancer due to a family history of  "the disease.  - He declines the hepatitis B vaccine.    Sleep Apnea  - He is currently using a CPAP machine for his sleep apnea.      Testosterone Deficiency  - Chronic  - He was previously on testosterone supplements but has run out.  - He is also taking vitamin D supplements, fish oil, and calcium.    ADHD  - He is interested in starting ADHD medication.    Supplemental Information  - He is having some sort of insurance issue with his cholesterol medication.    Social History:  Diet: He follows a keto diet, drinks plenty of water, and has reduced his intake of sugars and carbs.  Alcohol: He quit drinking alcohol 7 months ago.  Coffee/Tea/Caffeine-containing Drinks: He consumes a cup of coffee in the morning.  Sleep: He reports insomnia but falls asleep faster when he exercises.  Living Condition: He lives alone.    FAMILY HISTORY  The patient's mother and uncle had colon cancer. The patient's grandmother  of pancreatic cancer.    Health Maintenance: Completed    Objective:     Exam:  /78 (BP Location: Left arm, Patient Position: Sitting, BP Cuff Size: Large adult)   Pulse 71   Temp 36.5 °C (97.7 °F) (Temporal)   Resp 18   Ht 1.778 m (5' 10\")   Wt 116 kg (255 lb 3.2 oz)   SpO2 97%   BMI 36.62 kg/m²  Body mass index is 36.62 kg/m².    Physical Exam  Constitutional:       Appearance: Normal appearance.   HENT:      Head: Normocephalic and atraumatic.      Nose: Septal deviation present.      Right Turbinates: Swollen.      Left Turbinates: Swollen.      Mouth/Throat:      Mouth: Mucous membranes are moist.   Eyes:      Extraocular Movements: Extraocular movements intact.      Conjunctiva/sclera: Conjunctivae normal.      Pupils: Pupils are equal, round, and reactive to light.   Cardiovascular:      Rate and Rhythm: Normal rate and regular rhythm.      Heart sounds: No murmur heard.  Pulmonary:      Effort: Pulmonary effort is normal.      Breath sounds: Normal breath sounds. No wheezing.   Abdominal: "      General: Abdomen is flat. Bowel sounds are normal.      Palpations: Abdomen is soft.   Skin:     General: Skin is warm and dry.   Neurological:      General: No focal deficit present.      Mental Status: He is alert and oriented to person, place, and time.   Psychiatric:         Mood and Affect: Mood normal.             Results  Lab Results   Component Value Date/Time    CHOLSTRLTOT 283 (H) 07/14/2025 07:42 AM    LDL see below 07/14/2025 07:42 AM    HDL 24 (A) 07/14/2025 07:42 AM    TRIGLYCERIDE 1033 (H) 07/14/2025 07:42 AM       Lab Results   Component Value Date/Time    SODIUM 139 07/14/2025 07:42 AM    POTASSIUM 4.1 07/14/2025 07:42 AM    CHLORIDE 105 07/14/2025 07:42 AM    CO2 21 07/14/2025 07:42 AM    GLUCOSE 101 (H) 07/14/2025 07:42 AM    BUN 21 07/14/2025 07:42 AM    CREATININE 1.15 07/14/2025 07:42 AM     Lab Results   Component Value Date/Time    ALKPHOSPHAT 79 07/14/2025 07:42 AM    ASTSGOT 38 07/14/2025 07:42 AM    ALTSGPT 83 (H) 07/14/2025 07:42 AM    TBILIRUBIN <0.2 07/14/2025 07:42 AM       Lab Results   Component Value Date/Time    WBC 5.5 07/14/2025 07:42 AM    RBC 5.14 07/14/2025 07:42 AM    HEMOGLOBIN 15.4 07/14/2025 07:42 AM    HEMATOCRIT 45.0 07/14/2025 07:42 AM    MCV 87.5 07/14/2025 07:42 AM    MCH 30.0 07/14/2025 07:42 AM    MCHC 34.2 07/14/2025 07:42 AM    MPV 9.6 07/14/2025 07:42 AM    NEUTSPOLYS 49.60 07/14/2025 07:42 AM    LYMPHOCYTES 38.50 07/14/2025 07:42 AM    MONOCYTES 9.00 07/14/2025 07:42 AM    EOSINOPHILS 2.20 07/14/2025 07:42 AM    BASOPHILS 0.50 07/14/2025 07:42 AM         Assessment & Plan:     1. Chronic nasal congestion  Referral to ENT      2. TOMER (obstructive sleep apnea)  Tirzepatide-Weight Management 2.5 MG/0.5ML Solution Auto-injector      3. Class 2 severe obesity due to excess calories with serious comorbidity and body mass index (BMI) of 36.0 to 36.9 in adult (HCC)  Tirzepatide-Weight Management 2.5 MG/0.5ML Solution Auto-injector    Pain Management Screen     Controlled Substance Treatment Agreement      4. Attention deficit hyperactivity disorder (ADHD), combined type  Pain Management Screen    Controlled Substance Treatment Agreement    lisdexamfetamine (VYVANSE) 30 MG capsule      5. Low testosterone  Pain Management Screen    Controlled Substance Treatment Agreement    Testosterone 40.5 MG/2.5GM (1.62%) Gel      6. Therapeutic drug monitoring  Pain Management Screen    Controlled Substance Treatment Agreement      7. Family history of colon cancer in mother  Referral to GI for Colonoscopy    Referral to Genetic Research Studies      8. Fatigue, unspecified type  Referral to Pulmonary and Sleep Medicine      9. Obesity (BMI 30-39.9)  lisdexamfetamine (VYVANSE) 30 MG capsule      10. TOMER on CPAP  Referral to Pulmonary and Sleep Medicine      11. Vitamin D deficiency        12. Seasonal allergies        13. Elevated blood sugar        14. BMI 34.0-34.9,adult        15. Elevated LFTs        16. Binge eating disorder, unspecified severity  lisdexamfetamine (VYVANSE) 30 MG capsule          Assessment & Plan  Brain fog/ADHD/Hypogonadism/TOMER  - Chronic and progressing  - May be attributed to low testosterone levels  - Current testosterone levels require supplementation  - Prescription for testosterone will be provided    - Will restart him on his prior dose  - Will start patient on vyvanse    - discussed possible SE  - CS agreement signed today  -  reviewed  - UDS done today    Focus and memory issues/ADHD  - Chronic and progressing  - ADHD medication will be initiated  - Discussed potential side effects: insomnia, worsening anxiety, high blood pressure, decreased appetite, palpitations  - Medication should be taken in the morning  - Follow-up in 4 weeks to monitor progress on ADHD medication  - May also be related to his TOMER and low testosterone    - Will restart patient on testosterone    Insomnia/TOMER/Fatigue  - May be related to chronic sinus issues and low  testosterone levels  - ADHD medication will be initiated  - Testosterone therapy will be resumed  - CPAP machine is currently being used for sleep apnea    Weight management/Binge eating/Elevated LFTs  - Chronic  - On weight loss program with naltrexone for about a month without significant results  - Discussed potential use of phentermine or other stimulants and its side effects: worsening anxiety, heart palpitations  - Will re-send in rx for GLP-1  - Will start patient on vyvanse  - Follow-up in 4 weeks to monitor progress on weight loss regimen    Allergies  - Current allergy medications and nasal sprays reported as ineffective  - Referral to ENT specialist for further evaluation  - Reverted to using Afrin for nasal congestion relief    Health maintenance  - Colonoscopy will be scheduled due to family history of colon cancer  - Referral to medical genetic screening to assess for genetic predispositions  - Prostate screening will be considered at age 50-55    Cholesterol management  - Cholesterol levels have shown improvement compared to a year ago  - Advised to continue current medication regimen    - Fenofibrate 145 mg daily    IFG  - Chronic  - Discussed lifestyle modifications    - decreasing simple carbohydrates (ie white bread/pasta, cakes, soda, alcohol)    - increasing complex carbohydrates and fiber (vegetables, beans)  - Can consider starting metformin as needed        Follow-up  - Scheduled in 4 weeks to monitor progress on ADHD medication and weight loss regimen        I spent a total of 47 minutes with record review, exam, communication with the patient, communication with other providers, and documentation of this encounter.    Return in about 4 weeks (around 8/22/2025) for Med F/U.    Please note that this dictation was created using voice recognition software. I have made every reasonable attempt to correct obvious errors, but I expect that there are errors of grammar and possibly content that I  did not discover before finalizing the note.      I personally reviewed and updated  the patient's personal, social, family and surgical history at today's appointment.

## 2025-07-30 NOTE — Clinical Note
REFERRAL APPROVAL NOTICE         Sent on July 30, 2025                   Braden Hill  [address]  [facility] [address]                   Dear Mr. Hill,    After a careful review of the medical information and benefit coverage, [facility] has processed your referral. See below for additional details.    If applicable, you must be actively enrolled with your insurance for coverage of the authorized service. If you have any questions regarding your coverage, please contact your insurance directly.    REFERRAL INFORMATION   Referral #:  [id number]  Referred-To Provider    Referred-By Provider:  Otolaryngology    Asuncion Jacob M.D.   [facility]      [address]  [facility] [address]  [phone] [address]  [facility] [address]  [phone]    Referral Start Date:  07/25/2025  Referral End Date:   07/25/2026             SCHEDULING  If you do not already have an appointment, please call [phone] to make an appointment.     MORE INFORMATION  If you do not already have a [employer] account, sign up at: [email or web address]  You can access your medical information, make appointments, see lab results, billing information, and more.  If you have questions regarding this referral, please contact  the [facility] Referrals department at:             [phone]. Monday - Friday 8:00AM - 5:00PM.     Sincerely,    [facility]

## 2025-07-30 NOTE — Clinical Note
REFERRAL APPROVAL NOTICE         Sent on July 30, 2025                   Braden Hill  [address]  [facility] [address]                   Dear Mr. Hill,    After a careful review of the medical information and benefit coverage, [facility] has processed your referral. See below for additional details.    If applicable, you must be actively enrolled with your insurance for coverage of the authorized service. If you have any questions regarding your coverage, please contact your insurance directly.    REFERRAL INFORMATION   Referral #:  [id number]  Referred-To Department    Referred-By Provider:  Gastroenterology    Asuncion Jacob M.D.   Department Of Surgery      [address]  [facility] [address]  [phone] [address], [address]  [facility] [id number] [phone]    Referral Start Date:  07/25/2025  Referral End Date:   07/25/2026             SCHEDULING  If you do not already have an appointment, please call [phone] to make an appointment.     MORE INFORMATION  If you do not already have a [employer] account, sign up at: [email or web address]  You can access your medical information, make appointments, see lab results, billing information, and more.  If you have questions regarding this referral, please contact  the [facility] Referrals department at:             [phone]. Monday - Friday 8:00AM - 5:00PM.     Sincerely,    [facility]

## 2025-07-31 LAB
1OH-MIDAZOLAM UR QL SCN: NOT DETECTED
6MAM UR QL: NOT DETECTED
7AMINOCLONAZEPAM UR QL: NOT DETECTED
A-OH ALPRAZ UR QL: NOT DETECTED
ALPRAZ UR QL: NOT DETECTED
AMPHET UR QL SCN: NOT DETECTED
ANNOTATION COMMENT IMP: ABNORMAL
BARBITURATES UR QL: NEGATIVE
BUPRENORPHINE UR QL: NOT DETECTED
BZE UR QL: NEGATIVE
CARBOXYTHC UR QL: ABNORMAL
CARISOPRODOL UR QL: NEGATIVE
CLONAZEPAM UR QL: NOT DETECTED
CODEINE UR QL: NOT DETECTED
CREAT UR-MCNC: 78.6 MG/DL (ref 20–400)
DIAZEPAM UR QL: NOT DETECTED
ETHYL GLUCURONIDE UR QL: NEGATIVE
FENTANYL UR QL: NOT DETECTED
GABAPENTIN UR QL CFM: NOT DETECTED
HYDROCODONE UR QL: NOT DETECTED
HYDROMORPHONE UR QL: NOT DETECTED
LORAZEPAM UR QL: NOT DETECTED
MDA UR QL: NOT DETECTED
MDEA UR QL: NOT DETECTED
MDMA UR QL: NOT DETECTED
ME-PHENIDATE UR QL: NOT DETECTED
METHADONE UR QL: NEGATIVE
METHAMPHET UR QL: NOT DETECTED
MIDAZOLAM UR QL SCN: NOT DETECTED
MORPHINE UR QL: NOT DETECTED
NALOXONE UR QL CFM: NOT DETECTED
NORBUPRENORPHINE UR QL CFM: NOT DETECTED
NORDIAZEPAM UR QL: NOT DETECTED
NORFENTANYL UR QL: NOT DETECTED
NORHYDROCODONE UR QL CFM: NOT DETECTED
NORMEPERIDINE UR QL CFM: NOT DETECTED
NOROXYCODONE UR QL CFM: NOT DETECTED
NOROXYMORPHONE UR QL SCN: NOT DETECTED
OXAZEPAM UR QL: NOT DETECTED
OXYCODONE UR QL: NOT DETECTED
OXYMORPHONE UR QL: NOT DETECTED
PATHOLOGY STUDY: ABNORMAL
PCP UR QL: NEGATIVE
PHENTERMINE UR QL: NOT DETECTED
PREGABALIN UR QL CFM: NOT DETECTED
SERVICE CMNT-IMP: ABNORMAL
TAPENTADOL UR QL SCN: NOT DETECTED
TAPENTADOL UR QL SCN: NOT DETECTED
TEMAZEPAM UR QL: NOT DETECTED
TRAMADOL UR QL: NEGATIVE
ZOLPIDEM PHENYL-4-CARB UR QL SCN: NOT DETECTED
ZOLPIDEM UR QL: NOT DETECTED

## 2025-07-31 NOTE — Clinical Note
REFERRAL APPROVAL NOTICE         Sent on July 31, 2025                   Braden Hill  [address]  [facility] [address]                   Dear Mr. Hill,    After a careful review of the medical information and benefit coverage, [facility] has processed your referral. See below for additional details.    If applicable, you must be actively enrolled with your insurance for coverage of the authorized service. If you have any questions regarding your coverage, please contact your insurance directly.    REFERRAL INFORMATION   Referral #:  [id number]  Referred-To Department    Referred-By Provider:  Pulmonary and Sleep Medicine    Asuncion Jacob M.D.   [facility]      [address]  [place] [address] [phone] [address]  [place] [address] [phone]    Referral Start Date:  07/25/2025  Referral End Date:   07/25/2026           SCHEDULING  If you do not already have an appointment, please call [phone] to make an appointment.   MORE INFORMATION  As a reminder, [facility] ownership has changed, meaning this location is now owned and operated by [facility]. As such, we want to clarify that our patients should expect to receive two separate bills for the services received at [facility] - one representing the [facility] facility fees as the owner of the establishment, and the other to represent the physician's services and subsequent fees. You can speak with your insurance carrier for a pricing estimate by calling the customer service number on the back of your card and ask about charges for a hospital outpatient visit.  If you do not already have a [employer] account, sign up at: [email or web address]  You can access your medical  information, make appointments, see lab results, billing information, and more.  If you have questions regarding this referral, please contact  the [facility] department at:             [phone]. Monday - Friday 7:30AM - 5:00PM.      Sincerely,  [facility]

## 2025-08-22 ENCOUNTER — APPOINTMENT (OUTPATIENT)
Dept: MEDICAL GROUP | Facility: PHYSICIAN GROUP | Age: 45
End: 2025-08-22
Payer: COMMERCIAL

## 2025-08-22 DIAGNOSIS — Z00.6 CLINICAL TRIAL PARTICIPANT: ICD-10-CM

## 2025-09-05 ENCOUNTER — APPOINTMENT (OUTPATIENT)
Dept: LAB | Facility: MEDICAL CENTER | Age: 45
End: 2025-09-05
Attending: FAMILY MEDICINE
Payer: COMMERCIAL

## 2025-09-16 ENCOUNTER — APPOINTMENT (OUTPATIENT)
Dept: MEDICAL GROUP | Facility: PHYSICIAN GROUP | Age: 45
End: 2025-09-16
Payer: COMMERCIAL

## 2025-10-16 ENCOUNTER — APPOINTMENT (OUTPATIENT)
Dept: MEDICAL GROUP | Facility: PHYSICIAN GROUP | Age: 45
End: 2025-10-16
Payer: COMMERCIAL

## (undated) DEVICE — DRESSING TRANSPARENT FILM TEGADERM 2.375 X 2.75"  (100EA/BX)"

## (undated) DEVICE — COVER LIGHT HANDLE ALC PLUS DISP (18EA/BX)

## (undated) DEVICE — MASK ANESTHESIA ADULT  - (100/CA)

## (undated) DEVICE — ELECTRODE DUAL RETURN W/ CORD - (50/PK)

## (undated) DEVICE — KIT ANESTHESIA W/CIRCUIT & 3/LT BAG W/FILTER (20EA/CA)

## (undated) DEVICE — GOWN WARMING STANDARD FLEX - (30/CA)

## (undated) DEVICE — NEEDLE INSFL 120MM 14GA VRRS - (20/BX)

## (undated) DEVICE — TUBE E-T HI-LO CUFF 8.0MM (10EA/PK)

## (undated) DEVICE — OBTURATOR BLADELESS STANDARD 8MM (6EA/BX)

## (undated) DEVICE — SENSOR SPO2 NEO LNCS ADHESIVE (20/BX) SEE USER NOTES

## (undated) DEVICE — NEEDLE DRIVER MEGA SUTURECUT DA VINCI 15X'S REUSABLE

## (undated) DEVICE — SUCTION INSTRUMENT YANKAUER BULBOUS TIP W/O VENT (50EA/CA)

## (undated) DEVICE — GLOVE BIOGEL INDICATOR SZ 6.5 SURGICAL PF LTX - (50PR/BX 4BX/CA)

## (undated) DEVICE — SLEEVE, VASO, THIGH, MED

## (undated) DEVICE — TUBING CLEARLINK DUO-VENT - C-FLO (48EA/CA)

## (undated) DEVICE — SYRINGE ASEPTO - (50EA/CA

## (undated) DEVICE — LACTATED RINGERS INJ 1000 ML - (14EA/CA 60CA/PF)

## (undated) DEVICE — DRAPE COLUMN  BOX OF 20

## (undated) DEVICE — SEAL 5MM-8MM UNIVERSAL  BOX OF 10

## (undated) DEVICE — SUTURE GENERAL

## (undated) DEVICE — PROTECTOR ULNA NERVE - (36PR/CA)

## (undated) DEVICE — CANNULA W/SEAL 5X100 Z-THRE - ADED KII (12/BX)

## (undated) DEVICE — GLOVE BIOGEL INDICATOR SZ 7.5 SURGICAL PF LTX - (50PR/BX 4BX/CA)

## (undated) DEVICE — SUTURE 2-0 20CM STRATAFIX SPIRAL SH NEEDLE (12/BX)

## (undated) DEVICE — TUBE CONNECT SUCTION CLEAR 120 X 1/4" (50EA/CA)"

## (undated) DEVICE — Device

## (undated) DEVICE — ROBOTIC SURGERY SERVICES

## (undated) DEVICE — SUTURE 4-0 MONOCRYL PLUS PS-2 - 27 INCH (36/BX)

## (undated) DEVICE — SET EXTENSION WITH 2 PORTS (48EA/CA) ***PART #2C8610 IS A SUBSTITUTE*****

## (undated) DEVICE — COVER TIP ENDOWRIST HOT SHEAR - (10EA/BX) DA VINCI

## (undated) DEVICE — CHLORAPREP 26 ML APPLICATOR - ORANGE TINT(25/CA)

## (undated) DEVICE — HEAD HOLDER JUNIOR/ADULT

## (undated) DEVICE — SHEARS MONOPOLAR CURVED  DA VINCI 10X'S REUSABLE

## (undated) DEVICE — SET SUCTION/IRRIGATION WITH DISPOSABLE TIP (6/CA )PART #0250-070-520 IS A SUB

## (undated) DEVICE — SUTURE 4-0 VICRYL PLUS FS-2 - 27 INCH (36/BX)

## (undated) DEVICE — SENSOR OXIMETER ADULT SPO2 RD SET (20EA/BX)

## (undated) DEVICE — CANISTER SUCTION 3000ML MECHANICAL FILTER AUTO SHUTOFF MEDI-VAC NONSTERILE LF DISP (40EA/CA)

## (undated) DEVICE — GLOVE BIOGEL SZ 7.5 SURGICAL PF LTX - (50PR/BX 4BX/CA)

## (undated) DEVICE — GLOVE BIOGEL SZ 6.5 SURGICAL PF LTX (50PR/BX 4BX/CA)

## (undated) DEVICE — SPONGE GAUZESTER. 2X2 4-PL - (2/PK 50PK/BX 30BX/CS)

## (undated) DEVICE — DRAPE ARM  BOX OF 20

## (undated) DEVICE — DERMABOND ADVANCED - (12EA/BX)

## (undated) DEVICE — SUTURE 0 VICRYL PLUS UR-6 - 27 INCH (36/BX)

## (undated) DEVICE — PACK LAP CHOLE OR - (2EA/CA)

## (undated) DEVICE — TROCARCANN&SEAL 5X55 ZTHREAD - 12/BX

## (undated) DEVICE — STAPLE 45MM VASCULAR WHITE 2.5MM (12EA/BX)

## (undated) DEVICE — TROCAR 5X100 BLADED Z-THREAD - KII (6/BX)

## (undated) DEVICE — TROCAR Z THREAD 12 X 100 - BLADED (6/BX)

## (undated) DEVICE — CANISTER SUCTION 3000ML MECHANICAL FILTER AUTO SHUTOFF MEDI-VAC NONSTERILE LF DISP  (40EA/CA)

## (undated) DEVICE — SODIUM CHL IRRIGATION 0.9% 1000ML (12EA/CA)

## (undated) DEVICE — GLOVE BIOGEL PI ULTRATOUCH SZ 7.0 SURGICAL PF LF- POWDER FREE (50/BX 4BX/CA)

## (undated) DEVICE — TROCAR5X55 KII SHIELDED SYS - (6/BX)

## (undated) DEVICE — SYSTEM CLEARIFY VISUALIZATION (10EA/PK)

## (undated) DEVICE — ELECTRODE 850 FOAM ADHESIVE - HYDROGEL RADIOTRNSPRNT (50/PK)

## (undated) DEVICE — SET LEADWIRE 5 LEAD BEDSIDE DISPOSABLE ECG (1SET OF 5/EA)

## (undated) DEVICE — GLOVE SZ 6 BIOGEL PI MICRO - PF LF (50PR/BX 4BX/CA)

## (undated) DEVICE — PAD GROUNDING BOVIE PEDS - (25/CA)